# Patient Record
Sex: FEMALE | Race: WHITE | NOT HISPANIC OR LATINO | Employment: OTHER | URBAN - NONMETROPOLITAN AREA
[De-identification: names, ages, dates, MRNs, and addresses within clinical notes are randomized per-mention and may not be internally consistent; named-entity substitution may affect disease eponyms.]

---

## 2017-05-11 ENCOUNTER — OFFICE VISIT (OUTPATIENT)
Dept: ORTHOPEDIC SURGERY | Facility: CLINIC | Age: 52
End: 2017-05-11

## 2017-05-11 DIAGNOSIS — S88.111S COMPLETE BELOW KNEE AMPUTATION OF RIGHT LOWER EXTREMITY, SEQUELA: ICD-10-CM

## 2017-05-11 DIAGNOSIS — E66.01 OBESITY, CLASS III, BMI 40-49.9 (MORBID OBESITY) (HCC): ICD-10-CM

## 2017-05-11 DIAGNOSIS — Z89.512 S/P BILATERAL BELOW KNEE AMPUTATION (HCC): ICD-10-CM

## 2017-05-11 DIAGNOSIS — Z89.511 S/P BILATERAL BELOW KNEE AMPUTATION (HCC): ICD-10-CM

## 2017-05-11 DIAGNOSIS — Z96.653 HISTORY OF BILATERAL KNEE ARTHROPLASTY: Primary | ICD-10-CM

## 2017-05-11 PROCEDURE — 99213 OFFICE O/P EST LOW 20 MIN: CPT | Performed by: ORTHOPAEDIC SURGERY

## 2018-05-24 ENCOUNTER — OFFICE VISIT (OUTPATIENT)
Dept: ORTHOPEDIC SURGERY | Facility: CLINIC | Age: 53
End: 2018-05-24

## 2018-05-24 DIAGNOSIS — E66.01 OBESITY, CLASS III, BMI 40-49.9 (MORBID OBESITY) (HCC): ICD-10-CM

## 2018-05-24 DIAGNOSIS — Z89.511 S/P BILATERAL BELOW KNEE AMPUTATION (HCC): Primary | ICD-10-CM

## 2018-05-24 DIAGNOSIS — Z89.512 S/P BILATERAL BELOW KNEE AMPUTATION (HCC): Primary | ICD-10-CM

## 2018-05-24 PROCEDURE — 99213 OFFICE O/P EST LOW 20 MIN: CPT | Performed by: ORTHOPAEDIC SURGERY

## 2019-01-25 ENCOUNTER — HOSPITAL ENCOUNTER (OUTPATIENT)
Dept: ULTRASOUND IMAGING | Facility: HOSPITAL | Age: 54
Discharge: HOME | End: 2019-01-25

## 2019-02-05 ENCOUNTER — HOSPITAL ENCOUNTER (OUTPATIENT)
Dept: WOUND CARE | Facility: HOSPITAL | Age: 54
Setting detail: RECURRING SERIES
Discharge: STILL ADMITTED OR ADMINISTRATIVE | End: 2019-02-28
Attending: SURGERY

## 2019-02-07 LAB — BACTERIA SPEC AEROBE CULT: NORMAL

## 2019-03-12 ENCOUNTER — HOSPITAL ENCOUNTER (OUTPATIENT)
Dept: WOUND CARE | Facility: HOSPITAL | Age: 54
Setting detail: RECURRING SERIES
Discharge: HOME | End: 2019-03-31
Attending: SURGERY

## 2019-04-02 ENCOUNTER — HOSPITAL ENCOUNTER (OUTPATIENT)
Dept: WOUND CARE | Facility: HOSPITAL | Age: 54
Setting detail: RECURRING SERIES
Discharge: HOME | End: 2019-04-30
Attending: SURGERY

## 2020-02-24 ENCOUNTER — HOSPITAL ENCOUNTER (OUTPATIENT)
Dept: WOUND CARE | Facility: HOSPITAL | Age: 55
Setting detail: RECURRING SERIES
Discharge: STILL ADMITTED OR ADMINISTRATIVE | End: 2020-05-24
Attending: SURGERY

## 2020-02-27 LAB
BACTERIA SPEC AEROBE CULT: ABNORMAL
CIPROFLOXACIN SUSC ISLT: >=8
CLINDAMYCIN SUSC ISLT: 0.25
DAPTOMYCIN SUSC ISLT: 0.25
DOXYCYCLINE SUSC ISLT: 1
ERYTHROMYCIN SUSC ISLT: >=8
GENTAMICIN SUSC ISLT: <=0.5
LEVOFLOXACIN SUSC ISLT: >=8
OXACILLIN SUSC ISLT: >=4
RIFAMPIN SUSC ISLT: <=0.5
TETRACYCLINE SUSC ISLT: 2
TIGECYCLINE SUSC ISLT: 0.25
TMP SMX SUSC ISLT: >=320
VANCOMYCIN SUSC ISLT: 1

## 2020-06-01 ENCOUNTER — HOSPITAL ENCOUNTER (OUTPATIENT)
Dept: OTHER | Facility: HOSPITAL | Age: 55
Discharge: HOME | End: 2020-06-01
Attending: FAMILY MEDICINE

## 2020-06-01 LAB — SARS-COV-2 RNA SPEC QL NAA+PROBE: NOT DETECTED

## 2020-06-06 ENCOUNTER — HOSPITAL ENCOUNTER (OUTPATIENT)
Dept: OTHER | Facility: HOSPITAL | Age: 55
Discharge: HOME | End: 2020-06-06
Attending: FAMILY MEDICINE

## 2020-06-07 LAB — SARS-COV-2 RNA SPEC QL NAA+PROBE: NOT DETECTED

## 2020-06-25 ENCOUNTER — HOSPITAL ENCOUNTER (OUTPATIENT)
Dept: CT IMAGING | Facility: HOSPITAL | Age: 55
Discharge: HOME | End: 2020-06-25
Attending: FAMILY MEDICINE

## 2021-07-29 ENCOUNTER — TELEPHONE (OUTPATIENT)
Dept: ORTHOPEDIC SURGERY | Facility: CLINIC | Age: 56
End: 2021-07-29

## 2021-08-05 ENCOUNTER — OFFICE VISIT (OUTPATIENT)
Dept: ORTHOPEDIC SURGERY | Facility: CLINIC | Age: 56
End: 2021-08-05

## 2021-08-05 VITALS — OXYGEN SATURATION: 96 % | HEIGHT: 62 IN | HEART RATE: 100 BPM

## 2021-08-05 DIAGNOSIS — M19.011 PRIMARY OSTEOARTHRITIS OF RIGHT SHOULDER: Primary | ICD-10-CM

## 2021-08-05 DIAGNOSIS — M75.101 NONTRAUMATIC TEAR OF RIGHT ROTATOR CUFF, UNSPECIFIED TEAR EXTENT: ICD-10-CM

## 2021-08-05 PROCEDURE — 99213 OFFICE O/P EST LOW 20 MIN: CPT | Performed by: ORTHOPAEDIC SURGERY

## 2021-08-05 PROCEDURE — 20610 DRAIN/INJ JOINT/BURSA W/O US: CPT | Performed by: ORTHOPAEDIC SURGERY

## 2021-08-05 RX ADMIN — METHYLPREDNISOLONE ACETATE 80 MG: 80 INJECTION, SUSPENSION INTRA-ARTICULAR; INTRALESIONAL; INTRAMUSCULAR; SOFT TISSUE at 15:41

## 2021-08-05 RX ADMIN — LIDOCAINE HYDROCHLORIDE 9 ML: 10 INJECTION, SOLUTION INFILTRATION; PERINEURAL at 15:41

## 2021-08-08 RX ORDER — METHYLPREDNISOLONE ACETATE 80 MG/ML
80 INJECTION, SUSPENSION INTRA-ARTICULAR; INTRALESIONAL; INTRAMUSCULAR; SOFT TISSUE
Status: COMPLETED | OUTPATIENT
Start: 2021-08-05 | End: 2021-08-05

## 2021-08-08 RX ORDER — LIDOCAINE HYDROCHLORIDE 10 MG/ML
9 INJECTION, SOLUTION INFILTRATION; PERINEURAL
Status: COMPLETED | OUTPATIENT
Start: 2021-08-05 | End: 2021-08-05

## 2021-09-30 ENCOUNTER — OFFICE VISIT (OUTPATIENT)
Dept: ORTHOPEDIC SURGERY | Facility: CLINIC | Age: 56
End: 2021-09-30

## 2021-09-30 VITALS — HEART RATE: 97 BPM | WEIGHT: 293 LBS | HEIGHT: 62 IN | OXYGEN SATURATION: 97 % | BODY MASS INDEX: 53.92 KG/M2

## 2021-09-30 DIAGNOSIS — M75.101 NONTRAUMATIC TEAR OF RIGHT ROTATOR CUFF, UNSPECIFIED TEAR EXTENT: Primary | ICD-10-CM

## 2021-09-30 DIAGNOSIS — M19.011 PRIMARY OSTEOARTHRITIS OF RIGHT SHOULDER: ICD-10-CM

## 2021-09-30 PROCEDURE — 20610 DRAIN/INJ JOINT/BURSA W/O US: CPT | Performed by: PHYSICIAN ASSISTANT

## 2021-09-30 PROCEDURE — 99213 OFFICE O/P EST LOW 20 MIN: CPT | Performed by: PHYSICIAN ASSISTANT

## 2021-09-30 RX ORDER — ATORVASTATIN CALCIUM 40 MG/1
TABLET, FILM COATED ORAL
COMMUNITY
Start: 2021-09-18

## 2021-09-30 RX ORDER — LEVOTHYROXINE SODIUM 0.05 MG/1
TABLET ORAL
COMMUNITY
Start: 2021-07-27

## 2021-09-30 RX ORDER — METHYLPREDNISOLONE ACETATE 80 MG/ML
80 INJECTION, SUSPENSION INTRA-ARTICULAR; INTRALESIONAL; INTRAMUSCULAR; SOFT TISSUE
Status: COMPLETED | OUTPATIENT
Start: 2021-09-30 | End: 2021-09-30

## 2021-09-30 RX ORDER — LIDOCAINE HYDROCHLORIDE 10 MG/ML
9 INJECTION, SOLUTION INFILTRATION; PERINEURAL
Status: COMPLETED | OUTPATIENT
Start: 2021-09-30 | End: 2021-09-30

## 2021-09-30 RX ORDER — FLUTICASONE PROPIONATE 50 MCG
SPRAY, SUSPENSION (ML) NASAL
COMMUNITY
Start: 2021-09-05

## 2021-09-30 RX ORDER — CYCLOBENZAPRINE HCL 10 MG
TABLET ORAL
COMMUNITY
Start: 2021-08-29

## 2021-09-30 RX ORDER — AMLODIPINE BESYLATE 5 MG/1
TABLET ORAL
COMMUNITY
Start: 2021-09-14

## 2021-09-30 RX ORDER — INSULIN DETEMIR 100 [IU]/ML
INJECTION, SOLUTION SUBCUTANEOUS
COMMUNITY
Start: 2021-09-12

## 2021-09-30 RX ORDER — CANAGLIFLOZIN 300 MG/1
TABLET, FILM COATED ORAL
COMMUNITY
Start: 2021-09-22

## 2021-09-30 RX ORDER — LOSARTAN POTASSIUM 100 MG/1
TABLET ORAL
COMMUNITY
Start: 2021-09-13

## 2021-09-30 RX ADMIN — LIDOCAINE HYDROCHLORIDE 9 ML: 10 INJECTION, SOLUTION INFILTRATION; PERINEURAL at 14:58

## 2021-09-30 RX ADMIN — METHYLPREDNISOLONE ACETATE 80 MG: 80 INJECTION, SUSPENSION INTRA-ARTICULAR; INTRALESIONAL; INTRAMUSCULAR; SOFT TISSUE at 14:58

## 2022-01-11 ENCOUNTER — OFFICE VISIT (OUTPATIENT)
Dept: ORTHOPEDIC SURGERY | Facility: CLINIC | Age: 57
End: 2022-01-11

## 2022-01-11 VITALS — BODY MASS INDEX: 53.92 KG/M2 | OXYGEN SATURATION: 95 % | WEIGHT: 293 LBS | HEART RATE: 98 BPM | HEIGHT: 62 IN

## 2022-01-11 DIAGNOSIS — M19.011 PRIMARY OSTEOARTHRITIS OF RIGHT SHOULDER: ICD-10-CM

## 2022-01-11 DIAGNOSIS — M75.101 NONTRAUMATIC TEAR OF RIGHT ROTATOR CUFF, UNSPECIFIED TEAR EXTENT: Primary | ICD-10-CM

## 2022-01-11 PROCEDURE — 99213 OFFICE O/P EST LOW 20 MIN: CPT | Performed by: PHYSICIAN ASSISTANT

## 2022-01-11 PROCEDURE — 20610 DRAIN/INJ JOINT/BURSA W/O US: CPT | Performed by: PHYSICIAN ASSISTANT

## 2022-01-11 RX ORDER — AZELASTINE 1 MG/ML
SPRAY, METERED NASAL
COMMUNITY
Start: 2021-11-30

## 2022-01-11 RX ORDER — TRIAMCINOLONE ACETONIDE 40 MG/ML
40 INJECTION, SUSPENSION INTRA-ARTICULAR; INTRAMUSCULAR
Status: COMPLETED | OUTPATIENT
Start: 2022-01-11 | End: 2022-01-11

## 2022-01-11 RX ORDER — BUPIVACAINE HYDROCHLORIDE 2.5 MG/ML
5 INJECTION, SOLUTION INFILTRATION; PERINEURAL
Status: COMPLETED | OUTPATIENT
Start: 2022-01-11 | End: 2022-01-11

## 2022-01-11 RX ADMIN — TRIAMCINOLONE ACETONIDE 40 MG: 40 INJECTION, SUSPENSION INTRA-ARTICULAR; INTRAMUSCULAR at 13:37

## 2022-01-11 RX ADMIN — BUPIVACAINE HYDROCHLORIDE 5 ML: 2.5 INJECTION, SOLUTION INFILTRATION; PERINEURAL at 13:37

## 2022-04-11 ENCOUNTER — OFFICE VISIT (OUTPATIENT)
Dept: ORTHOPEDIC SURGERY | Facility: CLINIC | Age: 57
End: 2022-04-11

## 2022-04-11 VITALS — HEART RATE: 85 BPM | HEIGHT: 62 IN | BODY MASS INDEX: 53.92 KG/M2 | OXYGEN SATURATION: 95 % | WEIGHT: 293 LBS

## 2022-04-11 DIAGNOSIS — M75.101 NONTRAUMATIC TEAR OF RIGHT ROTATOR CUFF, UNSPECIFIED TEAR EXTENT: Primary | ICD-10-CM

## 2022-04-11 DIAGNOSIS — M19.011 PRIMARY OSTEOARTHRITIS OF RIGHT SHOULDER: ICD-10-CM

## 2022-04-11 PROCEDURE — 20610 DRAIN/INJ JOINT/BURSA W/O US: CPT | Performed by: PHYSICIAN ASSISTANT

## 2022-04-11 RX ORDER — TRIAMCINOLONE ACETONIDE 40 MG/ML
40 INJECTION, SUSPENSION INTRA-ARTICULAR; INTRAMUSCULAR
Status: COMPLETED | OUTPATIENT
Start: 2022-04-11 | End: 2022-04-11

## 2022-04-11 RX ORDER — LIDOCAINE HYDROCHLORIDE 10 MG/ML
5 INJECTION, SOLUTION INFILTRATION; PERINEURAL
Status: COMPLETED | OUTPATIENT
Start: 2022-04-11 | End: 2022-04-11

## 2022-04-11 RX ADMIN — LIDOCAINE HYDROCHLORIDE 5 ML: 10 INJECTION, SOLUTION INFILTRATION; PERINEURAL at 13:07

## 2022-04-11 RX ADMIN — TRIAMCINOLONE ACETONIDE 40 MG: 40 INJECTION, SUSPENSION INTRA-ARTICULAR; INTRAMUSCULAR at 13:07

## 2022-06-21 ENCOUNTER — OFFICE VISIT (OUTPATIENT)
Dept: SURGERY | Facility: CLINIC | Age: 57
End: 2022-06-21

## 2022-06-21 VITALS — RESPIRATION RATE: 14 BRPM | WEIGHT: 293 LBS | BODY MASS INDEX: 53.92 KG/M2 | HEIGHT: 62 IN

## 2022-06-21 DIAGNOSIS — K42.9 UMBILICAL HERNIA WITHOUT OBSTRUCTION AND WITHOUT GANGRENE: Primary | ICD-10-CM

## 2022-06-21 DIAGNOSIS — E66.3 OVERWEIGHT: ICD-10-CM

## 2022-06-21 DIAGNOSIS — Z89.9 S/P AMPUTATION: ICD-10-CM

## 2022-06-21 PROCEDURE — 99202 OFFICE O/P NEW SF 15 MIN: CPT | Performed by: SURGERY

## 2022-06-21 RX ORDER — CANAGLIFLOZIN 100 MG/1
TABLET, FILM COATED ORAL
COMMUNITY
Start: 2022-06-20

## 2022-06-21 RX ORDER — INSULIN ASPART 100 [IU]/ML
INJECTION, SOLUTION INTRAVENOUS; SUBCUTANEOUS
COMMUNITY
Start: 2022-06-05

## 2022-06-21 RX ORDER — DOXYCYCLINE HYCLATE 100 MG
TABLET ORAL
COMMUNITY
Start: 2022-04-24

## 2022-07-18 ENCOUNTER — OFFICE VISIT (OUTPATIENT)
Dept: ORTHOPEDIC SURGERY | Facility: CLINIC | Age: 57
End: 2022-07-18

## 2022-07-18 VITALS — BODY MASS INDEX: 53.92 KG/M2 | HEIGHT: 62 IN | HEART RATE: 91 BPM | WEIGHT: 293 LBS | OXYGEN SATURATION: 100 %

## 2022-07-18 DIAGNOSIS — M19.011 PRIMARY OSTEOARTHRITIS OF RIGHT SHOULDER: ICD-10-CM

## 2022-07-18 DIAGNOSIS — M75.101 NONTRAUMATIC TEAR OF RIGHT ROTATOR CUFF, UNSPECIFIED TEAR EXTENT: Primary | ICD-10-CM

## 2022-07-18 PROCEDURE — 20610 DRAIN/INJ JOINT/BURSA W/O US: CPT | Performed by: PHYSICIAN ASSISTANT

## 2022-07-18 RX ORDER — LIDOCAINE HYDROCHLORIDE 10 MG/ML
9 INJECTION, SOLUTION INFILTRATION; PERINEURAL
Status: COMPLETED | OUTPATIENT
Start: 2022-07-18 | End: 2022-07-18

## 2022-07-18 RX ORDER — TRIAMCINOLONE ACETONIDE 40 MG/ML
40 INJECTION, SUSPENSION INTRA-ARTICULAR; INTRAMUSCULAR
Status: COMPLETED | OUTPATIENT
Start: 2022-07-18 | End: 2022-07-18

## 2022-07-18 RX ORDER — SEMAGLUTIDE 1.34 MG/ML
INJECTION, SOLUTION SUBCUTANEOUS
COMMUNITY
Start: 2022-06-21 | End: 2022-10-17 | Stop reason: DRUGHIGH

## 2022-07-18 RX ADMIN — TRIAMCINOLONE ACETONIDE 40 MG: 40 INJECTION, SUSPENSION INTRA-ARTICULAR; INTRAMUSCULAR at 09:27

## 2022-07-18 RX ADMIN — LIDOCAINE HYDROCHLORIDE 9 ML: 10 INJECTION, SOLUTION INFILTRATION; PERINEURAL at 09:27

## 2022-09-20 ENCOUNTER — OFFICE VISIT (OUTPATIENT)
Dept: SURGERY | Facility: CLINIC | Age: 57
End: 2022-09-20

## 2022-09-20 ENCOUNTER — HOSPITAL ENCOUNTER (EMERGENCY)
Facility: HOSPITAL | Age: 57
End: 2022-09-20

## 2022-09-20 VITALS — RESPIRATION RATE: 16 BRPM | BODY MASS INDEX: 53.92 KG/M2 | HEIGHT: 62 IN | WEIGHT: 293 LBS

## 2022-09-20 DIAGNOSIS — Z89.9 S/P AMPUTATION: ICD-10-CM

## 2022-09-20 DIAGNOSIS — K42.9 UMBILICAL HERNIA WITHOUT OBSTRUCTION AND WITHOUT GANGRENE: Primary | ICD-10-CM

## 2022-09-20 DIAGNOSIS — E66.3 OVERWEIGHT: ICD-10-CM

## 2022-09-20 PROCEDURE — 99212 OFFICE O/P EST SF 10 MIN: CPT | Performed by: SURGERY

## 2022-09-20 RX ORDER — AMLODIPINE BESYLATE 5 MG/1
5 TABLET ORAL DAILY
COMMUNITY

## 2022-09-20 RX ORDER — LORATADINE 10 MG/1
CAPSULE, LIQUID FILLED ORAL
COMMUNITY

## 2022-09-20 RX ORDER — NYSTATIN 100000 [USP'U]/G
POWDER TOPICAL 4 TIMES DAILY
COMMUNITY

## 2022-10-17 ENCOUNTER — OFFICE VISIT (OUTPATIENT)
Dept: ORTHOPEDIC SURGERY | Facility: CLINIC | Age: 57
End: 2022-10-17

## 2022-10-17 VITALS — HEIGHT: 62 IN | BODY MASS INDEX: 53.92 KG/M2 | HEART RATE: 103 BPM | WEIGHT: 293 LBS | OXYGEN SATURATION: 93 %

## 2022-10-17 DIAGNOSIS — M19.011 PRIMARY OSTEOARTHRITIS OF RIGHT SHOULDER: ICD-10-CM

## 2022-10-17 DIAGNOSIS — M75.101 NONTRAUMATIC TEAR OF RIGHT ROTATOR CUFF, UNSPECIFIED TEAR EXTENT: Primary | ICD-10-CM

## 2022-10-17 PROCEDURE — 99213 OFFICE O/P EST LOW 20 MIN: CPT | Performed by: PHYSICIAN ASSISTANT

## 2022-10-17 RX ORDER — SEMAGLUTIDE 1.34 MG/ML
INJECTION, SOLUTION SUBCUTANEOUS
COMMUNITY
Start: 2022-10-12

## 2023-01-17 ENCOUNTER — OFFICE VISIT (OUTPATIENT)
Dept: ORTHOPEDIC SURGERY | Facility: CLINIC | Age: 58
End: 2023-01-17
Payer: MEDICARE

## 2023-01-17 VITALS — OXYGEN SATURATION: 93 % | HEIGHT: 62 IN | HEART RATE: 90 BPM | WEIGHT: 293 LBS | BODY MASS INDEX: 53.92 KG/M2

## 2023-01-17 DIAGNOSIS — M75.101 NONTRAUMATIC TEAR OF RIGHT ROTATOR CUFF, UNSPECIFIED TEAR EXTENT: Primary | ICD-10-CM

## 2023-01-17 DIAGNOSIS — M19.011 PRIMARY OSTEOARTHRITIS OF RIGHT SHOULDER: ICD-10-CM

## 2023-01-17 PROCEDURE — 20610 DRAIN/INJ JOINT/BURSA W/O US: CPT | Performed by: PHYSICIAN ASSISTANT

## 2023-01-17 RX ORDER — LIDOCAINE HYDROCHLORIDE 10 MG/ML
5 INJECTION, SOLUTION INFILTRATION; PERINEURAL
Status: COMPLETED | OUTPATIENT
Start: 2023-01-17 | End: 2023-01-17

## 2023-01-17 RX ORDER — GABAPENTIN 400 MG/1
CAPSULE ORAL
COMMUNITY
Start: 2023-01-16

## 2023-01-17 RX ORDER — INSULIN GLARGINE 100 [IU]/ML
INJECTION, SOLUTION SUBCUTANEOUS
COMMUNITY
Start: 2022-12-31

## 2023-01-17 RX ORDER — TRIAMCINOLONE ACETONIDE 40 MG/ML
40 INJECTION, SUSPENSION INTRA-ARTICULAR; INTRAMUSCULAR
Status: COMPLETED | OUTPATIENT
Start: 2023-01-17 | End: 2023-01-17

## 2023-01-17 RX ADMIN — LIDOCAINE HYDROCHLORIDE 5 ML: 10 INJECTION, SOLUTION INFILTRATION; PERINEURAL at 08:10

## 2023-01-17 RX ADMIN — TRIAMCINOLONE ACETONIDE 40 MG: 40 INJECTION, SUSPENSION INTRA-ARTICULAR; INTRAMUSCULAR at 08:10

## 2023-03-20 ENCOUNTER — OFFICE VISIT (OUTPATIENT)
Dept: SURGERY | Facility: CLINIC | Age: 58
End: 2023-03-20
Payer: MEDICARE

## 2023-03-20 VITALS — WEIGHT: 293 LBS | HEIGHT: 62 IN | BODY MASS INDEX: 53.92 KG/M2 | RESPIRATION RATE: 16 BRPM

## 2023-03-20 DIAGNOSIS — K42.9 UMBILICAL HERNIA WITHOUT OBSTRUCTION AND WITHOUT GANGRENE: Primary | ICD-10-CM

## 2023-03-20 DIAGNOSIS — E66.3 OVERWEIGHT: ICD-10-CM

## 2023-03-20 DIAGNOSIS — Z89.9 S/P AMPUTATION: ICD-10-CM

## 2023-03-20 PROCEDURE — 1160F RVW MEDS BY RX/DR IN RCRD: CPT | Performed by: SURGERY

## 2023-03-20 PROCEDURE — 99212 OFFICE O/P EST SF 10 MIN: CPT | Performed by: SURGERY

## 2023-03-20 PROCEDURE — 1159F MED LIST DOCD IN RCRD: CPT | Performed by: SURGERY

## 2023-03-20 RX ORDER — GLUCAGON 1 MG
KIT INJECTION
COMMUNITY
Start: 2023-02-06

## 2023-03-20 RX ORDER — GLUCAGON INJECTION, SOLUTION 1 MG/.2ML
INJECTION, SOLUTION SUBCUTANEOUS
COMMUNITY
Start: 2023-02-16

## 2023-04-18 ENCOUNTER — OFFICE VISIT (OUTPATIENT)
Dept: ORTHOPEDIC SURGERY | Facility: CLINIC | Age: 58
End: 2023-04-18
Payer: MEDICARE

## 2023-04-18 VITALS — HEART RATE: 75 BPM | WEIGHT: 293 LBS | OXYGEN SATURATION: 96 % | BODY MASS INDEX: 53.92 KG/M2 | HEIGHT: 62 IN

## 2023-04-18 DIAGNOSIS — M19.011 PRIMARY OSTEOARTHRITIS OF RIGHT SHOULDER: ICD-10-CM

## 2023-04-18 DIAGNOSIS — M75.101 NONTRAUMATIC TEAR OF RIGHT ROTATOR CUFF, UNSPECIFIED TEAR EXTENT: Primary | ICD-10-CM

## 2023-04-18 RX ORDER — METHYLPREDNISOLONE ACETATE 80 MG/ML
80 INJECTION, SUSPENSION INTRA-ARTICULAR; INTRALESIONAL; INTRAMUSCULAR; SOFT TISSUE
Status: COMPLETED | OUTPATIENT
Start: 2023-04-18 | End: 2023-04-18

## 2023-04-18 RX ORDER — LIDOCAINE HYDROCHLORIDE 10 MG/ML
5 INJECTION, SOLUTION EPIDURAL; INFILTRATION; INTRACAUDAL; PERINEURAL
Status: COMPLETED | OUTPATIENT
Start: 2023-04-18 | End: 2023-04-18

## 2023-04-18 RX ADMIN — LIDOCAINE HYDROCHLORIDE 5 ML: 10 INJECTION, SOLUTION EPIDURAL; INFILTRATION; INTRACAUDAL; PERINEURAL at 08:04

## 2023-04-18 RX ADMIN — METHYLPREDNISOLONE ACETATE 80 MG: 80 INJECTION, SUSPENSION INTRA-ARTICULAR; INTRALESIONAL; INTRAMUSCULAR; SOFT TISSUE at 08:04

## 2023-09-18 ENCOUNTER — OFFICE VISIT (OUTPATIENT)
Dept: SURGERY | Facility: CLINIC | Age: 58
End: 2023-09-18
Payer: MEDICARE

## 2023-09-18 VITALS — HEIGHT: 62 IN | BODY MASS INDEX: 53 KG/M2 | RESPIRATION RATE: 16 BRPM | WEIGHT: 288 LBS

## 2023-09-18 DIAGNOSIS — E66.3 OVERWEIGHT: ICD-10-CM

## 2023-09-18 DIAGNOSIS — K42.9 UMBILICAL HERNIA WITHOUT OBSTRUCTION AND WITHOUT GANGRENE: Primary | ICD-10-CM

## 2023-09-18 PROCEDURE — 1160F RVW MEDS BY RX/DR IN RCRD: CPT | Performed by: SURGERY

## 2023-09-18 PROCEDURE — 99212 OFFICE O/P EST SF 10 MIN: CPT | Performed by: SURGERY

## 2023-09-18 PROCEDURE — 1159F MED LIST DOCD IN RCRD: CPT | Performed by: SURGERY

## 2023-10-30 ENCOUNTER — OFFICE VISIT (OUTPATIENT)
Dept: ORTHOPEDIC SURGERY | Facility: CLINIC | Age: 58
End: 2023-10-30
Payer: MEDICARE

## 2023-10-30 VITALS
SYSTOLIC BLOOD PRESSURE: 134 MMHG | HEART RATE: 94 BPM | DIASTOLIC BLOOD PRESSURE: 80 MMHG | WEIGHT: 288.8 LBS | OXYGEN SATURATION: 97 % | HEIGHT: 62 IN | BODY MASS INDEX: 53.15 KG/M2

## 2023-10-30 DIAGNOSIS — M19.011 PRIMARY OSTEOARTHRITIS OF RIGHT SHOULDER: ICD-10-CM

## 2023-10-30 DIAGNOSIS — M75.101 NONTRAUMATIC TEAR OF RIGHT ROTATOR CUFF, UNSPECIFIED TEAR EXTENT: Primary | ICD-10-CM

## 2023-10-30 PROCEDURE — 99213 OFFICE O/P EST LOW 20 MIN: CPT | Performed by: PHYSICIAN ASSISTANT

## 2023-10-30 PROCEDURE — 1159F MED LIST DOCD IN RCRD: CPT | Performed by: PHYSICIAN ASSISTANT

## 2023-10-30 PROCEDURE — 20610 DRAIN/INJ JOINT/BURSA W/O US: CPT | Performed by: PHYSICIAN ASSISTANT

## 2023-10-30 PROCEDURE — 1160F RVW MEDS BY RX/DR IN RCRD: CPT | Performed by: PHYSICIAN ASSISTANT

## 2023-10-30 RX ORDER — TRIAMCINOLONE ACETONIDE 40 MG/ML
40 INJECTION, SUSPENSION INTRA-ARTICULAR; INTRAMUSCULAR
Status: COMPLETED | OUTPATIENT
Start: 2023-10-30 | End: 2023-10-30

## 2023-10-30 RX ORDER — LIDOCAINE HYDROCHLORIDE 10 MG/ML
5 INJECTION, SOLUTION INFILTRATION; PERINEURAL
Status: COMPLETED | OUTPATIENT
Start: 2023-10-30 | End: 2023-10-30

## 2023-10-30 RX ADMIN — LIDOCAINE HYDROCHLORIDE 5 ML: 10 INJECTION, SOLUTION INFILTRATION; PERINEURAL at 10:23

## 2023-10-30 RX ADMIN — TRIAMCINOLONE ACETONIDE 40 MG: 40 INJECTION, SUSPENSION INTRA-ARTICULAR; INTRAMUSCULAR at 10:23

## 2023-11-17 ENCOUNTER — HOSPITAL ENCOUNTER (OUTPATIENT)
Dept: MRI IMAGING | Facility: HOSPITAL | Age: 58
Discharge: HOME | End: 2023-11-17
Admitting: PHYSICIAN ASSISTANT
Payer: MEDICARE

## 2023-11-17 DIAGNOSIS — M75.101 NONTRAUMATIC TEAR OF RIGHT ROTATOR CUFF, UNSPECIFIED TEAR EXTENT: ICD-10-CM

## 2023-11-17 DIAGNOSIS — M19.011 PRIMARY OSTEOARTHRITIS OF RIGHT SHOULDER: ICD-10-CM

## 2023-11-17 PROCEDURE — 73221 MRI JOINT UPR EXTREM W/O DYE: CPT

## 2023-11-21 ENCOUNTER — OFFICE VISIT (OUTPATIENT)
Dept: ORTHOPEDIC SURGERY | Facility: CLINIC | Age: 58
End: 2023-11-21
Payer: MEDICARE

## 2023-11-21 VITALS — HEART RATE: 102 BPM | HEIGHT: 62 IN | BODY MASS INDEX: 53.55 KG/M2 | WEIGHT: 291.01 LBS | OXYGEN SATURATION: 92 %

## 2023-11-21 DIAGNOSIS — M75.101 NONTRAUMATIC TEAR OF RIGHT ROTATOR CUFF, UNSPECIFIED TEAR EXTENT: Primary | ICD-10-CM

## 2023-11-21 DIAGNOSIS — M19.011 PRIMARY OSTEOARTHRITIS OF RIGHT SHOULDER: ICD-10-CM

## 2023-11-21 RX ORDER — ATORVASTATIN CALCIUM 40 MG/1
TABLET, FILM COATED ORAL
COMMUNITY
Start: 2023-10-31

## 2024-01-11 ENCOUNTER — TELEPHONE (OUTPATIENT)
Dept: ORTHOPEDIC SURGERY | Facility: CLINIC | Age: 59
End: 2024-01-11
Payer: MEDICARE

## 2024-02-02 ENCOUNTER — OFFICE VISIT (OUTPATIENT)
Dept: ORTHOPEDIC SURGERY | Facility: CLINIC | Age: 59
End: 2024-02-02
Payer: MEDICARE

## 2024-02-02 VITALS
WEIGHT: 293 LBS | SYSTOLIC BLOOD PRESSURE: 179 MMHG | HEART RATE: 82 BPM | DIASTOLIC BLOOD PRESSURE: 90 MMHG | OXYGEN SATURATION: 96 % | HEIGHT: 62 IN | BODY MASS INDEX: 53.92 KG/M2

## 2024-02-02 DIAGNOSIS — M75.101 NONTRAUMATIC TEAR OF RIGHT ROTATOR CUFF, UNSPECIFIED TEAR EXTENT: Primary | ICD-10-CM

## 2024-02-02 DIAGNOSIS — M19.011 PRIMARY OSTEOARTHRITIS OF RIGHT SHOULDER: ICD-10-CM

## 2024-02-02 RX ORDER — ORAL SEMAGLUTIDE 7 MG/1
TABLET ORAL
COMMUNITY
Start: 2024-01-23

## 2024-02-02 RX ORDER — ERGOCALCIFEROL 1.25 MG/1
CAPSULE ORAL
COMMUNITY
Start: 2024-01-21

## 2024-02-02 RX ORDER — ACETAMINOPHEN 325 MG/1
TABLET ORAL
COMMUNITY
Start: 2024-01-28

## 2024-02-02 RX ORDER — LIDOCAINE HYDROCHLORIDE 10 MG/ML
5 INJECTION, SOLUTION INFILTRATION; PERINEURAL
Status: COMPLETED | OUTPATIENT
Start: 2024-02-02 | End: 2024-02-02

## 2024-02-02 RX ORDER — TRIAMCINOLONE ACETONIDE 40 MG/ML
40 INJECTION, SUSPENSION INTRA-ARTICULAR; INTRAMUSCULAR
Status: COMPLETED | OUTPATIENT
Start: 2024-02-02 | End: 2024-02-02

## 2024-02-02 RX ORDER — INSULIN GLARGINE 100 [IU]/ML
INJECTION, SOLUTION SUBCUTANEOUS
COMMUNITY
Start: 2024-01-12

## 2024-02-02 RX ADMIN — TRIAMCINOLONE ACETONIDE 40 MG: 40 INJECTION, SUSPENSION INTRA-ARTICULAR; INTRAMUSCULAR at 08:55

## 2024-02-02 RX ADMIN — LIDOCAINE HYDROCHLORIDE 5 ML: 10 INJECTION, SOLUTION INFILTRATION; PERINEURAL at 08:55

## 2024-03-19 ENCOUNTER — OFFICE VISIT (OUTPATIENT)
Dept: SURGERY | Facility: CLINIC | Age: 59
End: 2024-03-19
Payer: MEDICARE

## 2024-03-19 VITALS — HEIGHT: 62 IN | BODY MASS INDEX: 53.62 KG/M2

## 2024-03-19 DIAGNOSIS — E66.3 OVERWEIGHT: ICD-10-CM

## 2024-03-19 DIAGNOSIS — K42.9 UMBILICAL HERNIA WITHOUT OBSTRUCTION AND WITHOUT GANGRENE: Primary | ICD-10-CM

## 2024-03-19 PROCEDURE — 1159F MED LIST DOCD IN RCRD: CPT | Performed by: SURGERY

## 2024-03-19 PROCEDURE — 99202 OFFICE O/P NEW SF 15 MIN: CPT | Performed by: SURGERY

## 2024-03-19 PROCEDURE — 1160F RVW MEDS BY RX/DR IN RCRD: CPT | Performed by: SURGERY

## 2024-03-19 RX ORDER — LORATADINE 10 MG/1
TABLET ORAL
COMMUNITY
Start: 2024-01-18

## 2024-03-19 RX ORDER — MENTHOL AND METHYL SALICYLATE 10; 30 G/100G; G/100G
1 CREAM TOPICAL 3 TIMES DAILY PRN
COMMUNITY

## 2024-03-19 RX ORDER — INSULIN ADMIN. SUPPLIES
INSULIN PEN (EA) SUBCUTANEOUS
COMMUNITY
Start: 2024-03-11

## 2024-03-19 RX ORDER — ORAL SEMAGLUTIDE 3 MG/1
TABLET ORAL
COMMUNITY
Start: 2023-11-28

## 2024-04-11 ENCOUNTER — OFFICE VISIT (OUTPATIENT)
Dept: ORTHOPEDIC SURGERY | Facility: CLINIC | Age: 59
End: 2024-04-11
Payer: MEDICARE

## 2024-04-11 VITALS — WEIGHT: 293 LBS | BODY MASS INDEX: 53.92 KG/M2 | HEIGHT: 62 IN

## 2024-04-11 DIAGNOSIS — M75.101 NONTRAUMATIC TEAR OF RIGHT ROTATOR CUFF, UNSPECIFIED TEAR EXTENT: Primary | ICD-10-CM

## 2024-04-11 DIAGNOSIS — M19.011 PRIMARY OSTEOARTHRITIS OF RIGHT SHOULDER: ICD-10-CM

## 2024-04-11 RX ORDER — CLONIDINE HYDROCHLORIDE 0.1 MG/1
TABLET ORAL
COMMUNITY
Start: 2024-03-21

## 2024-04-11 RX ORDER — TRIAMCINOLONE ACETONIDE 40 MG/ML
40 INJECTION, SUSPENSION INTRA-ARTICULAR; INTRAMUSCULAR
Status: COMPLETED | OUTPATIENT
Start: 2024-04-11 | End: 2024-04-11

## 2024-04-11 RX ORDER — AMLODIPINE BESYLATE 10 MG/1
TABLET ORAL
COMMUNITY
Start: 2024-04-03

## 2024-04-11 RX ORDER — ORAL SEMAGLUTIDE 14 MG/1
TABLET ORAL
COMMUNITY
Start: 2024-03-20

## 2024-04-11 RX ORDER — LIDOCAINE HYDROCHLORIDE 10 MG/ML
5 INJECTION, SOLUTION INFILTRATION; PERINEURAL
Status: COMPLETED | OUTPATIENT
Start: 2024-04-11 | End: 2024-04-11

## 2024-04-11 RX ADMIN — LIDOCAINE HYDROCHLORIDE 5 ML: 10 INJECTION, SOLUTION INFILTRATION; PERINEURAL at 11:40

## 2024-04-11 RX ADMIN — TRIAMCINOLONE ACETONIDE 40 MG: 40 INJECTION, SUSPENSION INTRA-ARTICULAR; INTRAMUSCULAR at 11:40

## 2024-05-09 ENCOUNTER — TRANSCRIBE ORDERS (OUTPATIENT)
Dept: LAB | Facility: HOSPITAL | Age: 59
End: 2024-05-09
Payer: MEDICARE

## 2024-05-09 DIAGNOSIS — S09.90XS: ICD-10-CM

## 2024-05-09 DIAGNOSIS — S41.001D: Primary | ICD-10-CM

## 2024-05-09 DIAGNOSIS — S43.004D: Primary | ICD-10-CM

## 2024-05-09 DIAGNOSIS — F02.811: ICD-10-CM

## 2024-05-09 LAB
ALBUMIN SERPL-MCNC: 3 G/DL (ref 3.5–5.2)
ALBUMIN/GLOB SERPL: 1.2 G/DL
ALP SERPL-CCNC: 82 U/L (ref 39–117)
ALT SERPL W P-5'-P-CCNC: 13 U/L (ref 1–33)
ANION GAP SERPL CALCULATED.3IONS-SCNC: 11.6 MMOL/L (ref 5–15)
AST SERPL-CCNC: 9 U/L (ref 1–32)
BASOPHILS # BLD AUTO: 0.03 10*3/MM3 (ref 0–0.2)
BASOPHILS NFR BLD AUTO: 0.5 % (ref 0–1.5)
BILIRUB SERPL-MCNC: 0.3 MG/DL (ref 0–1.2)
BUN SERPL-MCNC: 33 MG/DL (ref 6–20)
BUN/CREAT SERPL: 17 (ref 7–25)
CALCIUM SPEC-SCNC: 9 MG/DL (ref 8.6–10.5)
CHLORIDE SERPL-SCNC: 107 MMOL/L (ref 98–107)
CO2 SERPL-SCNC: 20.4 MMOL/L (ref 22–29)
CREAT SERPL-MCNC: 1.94 MG/DL (ref 0.57–1)
DEPRECATED RDW RBC AUTO: 48.2 FL (ref 37–54)
EGFRCR SERPLBLD CKD-EPI 2021: 29.7 ML/MIN/1.73
EOSINOPHIL # BLD AUTO: 0.13 10*3/MM3 (ref 0–0.4)
EOSINOPHIL NFR BLD AUTO: 2.2 % (ref 0.3–6.2)
ERYTHROCYTE [DISTWIDTH] IN BLOOD BY AUTOMATED COUNT: 14.6 % (ref 12.3–15.4)
GLOBULIN UR ELPH-MCNC: 2.5 GM/DL
GLUCOSE SERPL-MCNC: 152 MG/DL (ref 65–99)
HCT VFR BLD AUTO: 39.5 % (ref 34–46.6)
HGB BLD-MCNC: 12.2 G/DL (ref 12–15.9)
IMM GRANULOCYTES # BLD AUTO: 0.01 10*3/MM3 (ref 0–0.05)
IMM GRANULOCYTES NFR BLD AUTO: 0.2 % (ref 0–0.5)
LYMPHOCYTES # BLD AUTO: 1.42 10*3/MM3 (ref 0.7–3.1)
LYMPHOCYTES NFR BLD AUTO: 23.6 % (ref 19.6–45.3)
MCH RBC QN AUTO: 27.9 PG (ref 26.6–33)
MCHC RBC AUTO-ENTMCNC: 30.9 G/DL (ref 31.5–35.7)
MCV RBC AUTO: 90.2 FL (ref 79–97)
MONOCYTES # BLD AUTO: 0.46 10*3/MM3 (ref 0.1–0.9)
MONOCYTES NFR BLD AUTO: 7.6 % (ref 5–12)
NEUTROPHILS NFR BLD AUTO: 3.97 10*3/MM3 (ref 1.7–7)
NEUTROPHILS NFR BLD AUTO: 65.9 % (ref 42.7–76)
NRBC BLD AUTO-RTO: 0 /100 WBC (ref 0–0.2)
PLATELET # BLD AUTO: 237 10*3/MM3 (ref 140–450)
PMV BLD AUTO: 10.6 FL (ref 6–12)
POTASSIUM SERPL-SCNC: 5.4 MMOL/L (ref 3.5–5.2)
PROT SERPL-MCNC: 5.5 G/DL (ref 6–8.5)
RBC # BLD AUTO: 4.38 10*6/MM3 (ref 3.77–5.28)
SODIUM SERPL-SCNC: 139 MMOL/L (ref 136–145)
WBC NRBC COR # BLD AUTO: 6.02 10*3/MM3 (ref 3.4–10.8)

## 2024-05-09 PROCEDURE — 85025 COMPLETE CBC W/AUTO DIFF WBC: CPT | Performed by: NURSE PRACTITIONER

## 2024-05-09 PROCEDURE — 80053 COMPREHEN METABOLIC PANEL: CPT | Performed by: NURSE PRACTITIONER

## 2024-05-09 PROCEDURE — 36415 COLL VENOUS BLD VENIPUNCTURE: CPT | Performed by: NURSE PRACTITIONER

## 2024-07-18 ENCOUNTER — OFFICE VISIT (OUTPATIENT)
Dept: ORTHOPEDIC SURGERY | Facility: CLINIC | Age: 59
End: 2024-07-18
Payer: MEDICARE

## 2024-07-18 VITALS
HEIGHT: 62 IN | SYSTOLIC BLOOD PRESSURE: 144 MMHG | DIASTOLIC BLOOD PRESSURE: 66 MMHG | HEART RATE: 82 BPM | OXYGEN SATURATION: 97 % | BODY MASS INDEX: 54.02 KG/M2

## 2024-07-18 DIAGNOSIS — M19.011 PRIMARY OSTEOARTHRITIS OF RIGHT SHOULDER: ICD-10-CM

## 2024-07-18 DIAGNOSIS — M75.101 NONTRAUMATIC TEAR OF RIGHT ROTATOR CUFF, UNSPECIFIED TEAR EXTENT: Primary | ICD-10-CM

## 2024-07-18 RX ORDER — LIDOCAINE HYDROCHLORIDE 10 MG/ML
5 INJECTION, SOLUTION INFILTRATION; PERINEURAL
Status: COMPLETED | OUTPATIENT
Start: 2024-07-18 | End: 2024-07-18

## 2024-07-18 RX ORDER — TRIAMCINOLONE ACETONIDE 40 MG/ML
40 INJECTION, SUSPENSION INTRA-ARTICULAR; INTRAMUSCULAR
Status: COMPLETED | OUTPATIENT
Start: 2024-07-18 | End: 2024-07-18

## 2024-07-18 RX ADMIN — TRIAMCINOLONE ACETONIDE 40 MG: 40 INJECTION, SUSPENSION INTRA-ARTICULAR; INTRAMUSCULAR at 10:16

## 2024-07-18 RX ADMIN — LIDOCAINE HYDROCHLORIDE 5 ML: 10 INJECTION, SOLUTION INFILTRATION; PERINEURAL at 10:16

## 2024-09-20 ENCOUNTER — OFFICE VISIT (OUTPATIENT)
Dept: SURGERY | Facility: CLINIC | Age: 59
End: 2024-09-20
Payer: MEDICARE

## 2024-09-20 VITALS
DIASTOLIC BLOOD PRESSURE: 67 MMHG | SYSTOLIC BLOOD PRESSURE: 153 MMHG | WEIGHT: 293 LBS | BODY MASS INDEX: 53.92 KG/M2 | HEART RATE: 67 BPM | HEIGHT: 62 IN

## 2024-09-20 DIAGNOSIS — E66.3 OVERWEIGHT: ICD-10-CM

## 2024-09-20 DIAGNOSIS — K42.9 UMBILICAL HERNIA WITHOUT OBSTRUCTION AND WITHOUT GANGRENE: Primary | ICD-10-CM

## 2024-09-20 RX ORDER — BUPRENORPHINE HYDROCHLORIDE AND NALOXONE HYDROCHLORIDE DIHYDRATE 8; 2 MG/1; MG/1
1 TABLET SUBLINGUAL DAILY
COMMUNITY

## 2024-09-20 RX ORDER — SEMAGLUTIDE 0.68 MG/ML
INJECTION, SOLUTION SUBCUTANEOUS
COMMUNITY
Start: 2024-07-18

## 2024-09-20 RX ORDER — SEMAGLUTIDE 1.34 MG/ML
INJECTION, SOLUTION SUBCUTANEOUS
COMMUNITY
Start: 2024-09-12

## 2024-09-20 RX ORDER — DAPAGLIFLOZIN 10 MG/1
TABLET, FILM COATED ORAL
COMMUNITY
Start: 2024-09-12

## 2024-09-20 RX ORDER — CHOLECALCIFEROL (VITAMIN D3) 1250 MCG
CAPSULE ORAL
COMMUNITY
Start: 2024-08-04

## 2024-09-20 RX ORDER — INSULIN GLARGINE 100 [IU]/ML
INJECTION, SOLUTION SUBCUTANEOUS
COMMUNITY
Start: 2024-09-14

## 2024-09-20 RX ORDER — INSULIN LISPRO 100 [IU]/ML
INJECTION, SOLUTION INTRAVENOUS; SUBCUTANEOUS
COMMUNITY
Start: 2024-07-31

## 2024-09-20 RX ORDER — FLUTICASONE PROPIONATE 50 MCG
SPRAY, SUSPENSION (ML) NASAL
COMMUNITY
Start: 2024-08-30

## 2024-09-20 RX ORDER — LOSARTAN POTASSIUM 25 MG/1
TABLET ORAL
COMMUNITY
Start: 2024-07-21

## 2024-09-20 RX ORDER — LOSARTAN POTASSIUM 50 MG/1
TABLET ORAL
COMMUNITY
Start: 2024-07-21

## 2024-09-20 RX ORDER — ERYTHROMYCIN 5 MG/G
OINTMENT OPHTHALMIC
COMMUNITY
Start: 2024-07-26

## 2024-09-20 RX ORDER — LORATADINE 10 MG/1
TABLET ORAL
COMMUNITY
Start: 2024-07-11

## 2024-09-20 RX ORDER — LEVOTHYROXINE SODIUM 75 UG/1
TABLET ORAL
COMMUNITY
Start: 2024-09-11

## 2024-09-20 RX ORDER — INSULIN ASPART 100 [IU]/ML
INJECTION, SOLUTION INTRAVENOUS; SUBCUTANEOUS
COMMUNITY
Start: 2024-07-11

## 2024-10-21 ENCOUNTER — OFFICE VISIT (OUTPATIENT)
Dept: ORTHOPEDIC SURGERY | Facility: CLINIC | Age: 59
End: 2024-10-21
Payer: MEDICARE

## 2024-10-21 VITALS — HEART RATE: 76 BPM | SYSTOLIC BLOOD PRESSURE: 152 MMHG | DIASTOLIC BLOOD PRESSURE: 77 MMHG | OXYGEN SATURATION: 96 %

## 2024-10-21 DIAGNOSIS — M75.101 NONTRAUMATIC TEAR OF RIGHT ROTATOR CUFF, UNSPECIFIED TEAR EXTENT: Primary | ICD-10-CM

## 2024-10-21 DIAGNOSIS — M19.011 PRIMARY OSTEOARTHRITIS OF RIGHT SHOULDER: ICD-10-CM

## 2024-10-21 PROCEDURE — 99213 OFFICE O/P EST LOW 20 MIN: CPT | Performed by: PHYSICIAN ASSISTANT

## 2024-10-21 PROCEDURE — 1159F MED LIST DOCD IN RCRD: CPT | Performed by: PHYSICIAN ASSISTANT

## 2024-10-21 PROCEDURE — 20610 DRAIN/INJ JOINT/BURSA W/O US: CPT | Performed by: PHYSICIAN ASSISTANT

## 2024-10-21 PROCEDURE — 1160F RVW MEDS BY RX/DR IN RCRD: CPT | Performed by: PHYSICIAN ASSISTANT

## 2024-10-21 RX ORDER — TRIAMCINOLONE ACETONIDE 40 MG/ML
40 INJECTION, SUSPENSION INTRA-ARTICULAR; INTRAMUSCULAR
Status: COMPLETED | OUTPATIENT
Start: 2024-10-21 | End: 2024-10-21

## 2024-10-21 RX ORDER — LIDOCAINE HYDROCHLORIDE 10 MG/ML
5 INJECTION, SOLUTION EPIDURAL; INFILTRATION; INTRACAUDAL; PERINEURAL
Status: COMPLETED | OUTPATIENT
Start: 2024-10-21 | End: 2024-10-21

## 2024-10-21 RX ADMIN — TRIAMCINOLONE ACETONIDE 40 MG: 40 INJECTION, SUSPENSION INTRA-ARTICULAR; INTRAMUSCULAR at 14:36

## 2024-10-21 RX ADMIN — LIDOCAINE HYDROCHLORIDE 5 ML: 10 INJECTION, SOLUTION EPIDURAL; INFILTRATION; INTRACAUDAL; PERINEURAL at 14:36

## 2024-11-27 ENCOUNTER — TRANSCRIBE ORDERS (OUTPATIENT)
Dept: ADMINISTRATIVE | Facility: HOSPITAL | Age: 59
End: 2024-11-27
Payer: MEDICARE

## 2024-11-27 ENCOUNTER — LAB (OUTPATIENT)
Facility: HOSPITAL | Age: 59
End: 2024-11-27
Payer: MEDICARE

## 2024-11-27 DIAGNOSIS — N18.30 STAGE 3 CHRONIC KIDNEY DISEASE, UNSPECIFIED WHETHER STAGE 3A OR 3B CKD: ICD-10-CM

## 2024-11-27 DIAGNOSIS — I10 ESSENTIAL HYPERTENSION, MALIGNANT: ICD-10-CM

## 2024-11-27 DIAGNOSIS — R80.8 OTHER PROTEINURIA: ICD-10-CM

## 2024-11-27 DIAGNOSIS — N18.30 STAGE 3 CHRONIC KIDNEY DISEASE, UNSPECIFIED WHETHER STAGE 3A OR 3B CKD: Primary | ICD-10-CM

## 2024-11-27 LAB
ALBUMIN SERPL-MCNC: 3.4 G/DL (ref 3.5–5.2)
ALBUMIN/GLOB SERPL: 1.3 G/DL
ALP SERPL-CCNC: 82 U/L (ref 39–117)
ALT SERPL W P-5'-P-CCNC: 17 U/L (ref 1–33)
ANION GAP SERPL CALCULATED.3IONS-SCNC: 9.3 MMOL/L (ref 5–15)
AST SERPL-CCNC: 15 U/L (ref 1–32)
BASOPHILS # BLD AUTO: 0.04 10*3/MM3 (ref 0–0.2)
BASOPHILS NFR BLD AUTO: 0.6 % (ref 0–1.5)
BILIRUB SERPL-MCNC: 0.2 MG/DL (ref 0–1.2)
BUN SERPL-MCNC: 31 MG/DL (ref 6–20)
BUN/CREAT SERPL: 13.5 (ref 7–25)
CALCIUM SPEC-SCNC: 10 MG/DL (ref 8.6–10.5)
CHLORIDE SERPL-SCNC: 106 MMOL/L (ref 98–107)
CO2 SERPL-SCNC: 22.7 MMOL/L (ref 22–29)
CREAT SERPL-MCNC: 2.29 MG/DL (ref 0.57–1)
DEPRECATED RDW RBC AUTO: 44.6 FL (ref 37–54)
EGFRCR SERPLBLD CKD-EPI 2021: 24.4 ML/MIN/1.73
EOSINOPHIL # BLD AUTO: 0.37 10*3/MM3 (ref 0–0.4)
EOSINOPHIL NFR BLD AUTO: 5.6 % (ref 0.3–6.2)
ERYTHROCYTE [DISTWIDTH] IN BLOOD BY AUTOMATED COUNT: 14.4 % (ref 12.3–15.4)
GLOBULIN UR ELPH-MCNC: 2.7 GM/DL
GLUCOSE SERPL-MCNC: 128 MG/DL (ref 65–99)
HCT VFR BLD AUTO: 40.6 % (ref 34–46.6)
HGB BLD-MCNC: 13.5 G/DL (ref 12–15.9)
IMM GRANULOCYTES # BLD AUTO: 0.01 10*3/MM3 (ref 0–0.05)
IMM GRANULOCYTES NFR BLD AUTO: 0.2 % (ref 0–0.5)
LYMPHOCYTES # BLD AUTO: 1.45 10*3/MM3 (ref 0.7–3.1)
LYMPHOCYTES NFR BLD AUTO: 21.9 % (ref 19.6–45.3)
MCH RBC QN AUTO: 28.4 PG (ref 26.6–33)
MCHC RBC AUTO-ENTMCNC: 33.3 G/DL (ref 31.5–35.7)
MCV RBC AUTO: 85.3 FL (ref 79–97)
MONOCYTES # BLD AUTO: 0.36 10*3/MM3 (ref 0.1–0.9)
MONOCYTES NFR BLD AUTO: 5.4 % (ref 5–12)
NEUTROPHILS NFR BLD AUTO: 4.38 10*3/MM3 (ref 1.7–7)
NEUTROPHILS NFR BLD AUTO: 66.3 % (ref 42.7–76)
NRBC BLD AUTO-RTO: 0 /100 WBC (ref 0–0.2)
PLATELET # BLD AUTO: 244 10*3/MM3 (ref 140–450)
PMV BLD AUTO: 10.1 FL (ref 6–12)
POTASSIUM SERPL-SCNC: 4.8 MMOL/L (ref 3.5–5.2)
PROT SERPL-MCNC: 6.1 G/DL (ref 6–8.5)
RBC # BLD AUTO: 4.76 10*6/MM3 (ref 3.77–5.28)
SODIUM SERPL-SCNC: 138 MMOL/L (ref 136–145)
WBC NRBC COR # BLD AUTO: 6.61 10*3/MM3 (ref 3.4–10.8)

## 2024-11-27 PROCEDURE — 82610 CYSTATIN C: CPT

## 2024-11-27 PROCEDURE — 82784 ASSAY IGA/IGD/IGG/IGM EACH: CPT

## 2024-11-27 PROCEDURE — 80053 COMPREHEN METABOLIC PANEL: CPT

## 2024-11-27 PROCEDURE — 36415 COLL VENOUS BLD VENIPUNCTURE: CPT

## 2024-11-27 PROCEDURE — 86334 IMMUNOFIX E-PHORESIS SERUM: CPT

## 2024-11-27 PROCEDURE — 83521 IG LIGHT CHAINS FREE EACH: CPT

## 2024-11-27 PROCEDURE — 84165 PROTEIN E-PHORESIS SERUM: CPT

## 2024-11-27 PROCEDURE — 85025 COMPLETE CBC W/AUTO DIFF WBC: CPT

## 2024-11-29 LAB
ALBUMIN SERPL ELPH-MCNC: 2.9 G/DL (ref 2.9–4.4)
ALBUMIN/GLOB SERPL: 1 {RATIO} (ref 0.7–1.7)
ALPHA1 GLOB SERPL ELPH-MCNC: 0.3 G/DL (ref 0–0.4)
ALPHA2 GLOB SERPL ELPH-MCNC: 1.2 G/DL (ref 0.4–1)
B-GLOBULIN SERPL ELPH-MCNC: 1 G/DL (ref 0.7–1.3)
GAMMA GLOB SERPL ELPH-MCNC: 0.5 G/DL (ref 0.4–1.8)
GLOBULIN SER CALC-MCNC: 3 G/DL (ref 2.2–3.9)
KAPPA LC FREE SER-MCNC: 60.5 MG/L (ref 3.3–19.4)
KAPPA LC FREE/LAMBDA FREE SER: 2.06 {RATIO} (ref 0.26–1.65)
LABORATORY COMMENT REPORT: ABNORMAL
LAMBDA LC FREE SERPL-MCNC: 29.4 MG/L (ref 5.7–26.3)
M PROTEIN SERPL ELPH-MCNC: ABNORMAL G/DL
PROT PATTERN SERPL ELPH-IMP: ABNORMAL
PROT SERPL-MCNC: 5.9 G/DL (ref 6–8.5)

## 2024-11-30 LAB — CYSTATIN C SERPL-MCNC: 2.81 MG/L (ref 0.67–1.14)

## 2024-12-02 LAB
IGA SERPL-MCNC: 169 MG/DL (ref 87–352)
IGG SERPL-MCNC: 603 MG/DL (ref 586–1602)
IGM SERPL-MCNC: 32 MG/DL (ref 26–217)
PROT PATTERN SERPL IFE-IMP: NORMAL

## 2025-02-06 ENCOUNTER — OFFICE VISIT (OUTPATIENT)
Dept: ORTHOPEDIC SURGERY | Facility: CLINIC | Age: 60
End: 2025-02-06
Payer: MEDICARE

## 2025-02-06 VITALS — HEART RATE: 73 BPM | SYSTOLIC BLOOD PRESSURE: 157 MMHG | DIASTOLIC BLOOD PRESSURE: 77 MMHG | OXYGEN SATURATION: 95 %

## 2025-02-06 DIAGNOSIS — M75.101 NONTRAUMATIC TEAR OF RIGHT ROTATOR CUFF, UNSPECIFIED TEAR EXTENT: Primary | ICD-10-CM

## 2025-02-06 DIAGNOSIS — M19.011 PRIMARY OSTEOARTHRITIS OF RIGHT SHOULDER: ICD-10-CM

## 2025-02-06 RX ORDER — LIDOCAINE HYDROCHLORIDE 10 MG/ML
5 INJECTION, SOLUTION INFILTRATION; PERINEURAL
Status: COMPLETED | OUTPATIENT
Start: 2025-02-06 | End: 2025-02-06

## 2025-02-06 RX ORDER — TRIAMCINOLONE ACETONIDE 40 MG/ML
40 INJECTION, SUSPENSION INTRA-ARTICULAR; INTRAMUSCULAR
Status: COMPLETED | OUTPATIENT
Start: 2025-02-06 | End: 2025-02-06

## 2025-02-06 RX ADMIN — LIDOCAINE HYDROCHLORIDE 5 ML: 10 INJECTION, SOLUTION INFILTRATION; PERINEURAL at 14:05

## 2025-02-06 RX ADMIN — TRIAMCINOLONE ACETONIDE 40 MG: 40 INJECTION, SUSPENSION INTRA-ARTICULAR; INTRAMUSCULAR at 14:05

## 2025-02-06 NOTE — PROGRESS NOTES
Chief Complaint  Follow-up of the Right Shoulder    Subjective          History of Present Illness      Desi Krishnan is a 60 y.o. female  presents to [facility] for     Patient presents for follow-up evaluation of right shoulder pain right shoulder osteoarthritis and rotator cuff tear.  She is a resident at [facility].  She states her shoulder pain continues to cause her concern and it takes her at night she is in a wheelchair due to bilateral leg amputations she states that the injections are helping her shoulder she would like to continue conservative treatment.      Allergies   Allergen Reactions    Hydrochlorothiazide     Penicillin G Hives    Penicillins     Vancomycin         Social History     Socioeconomic History    Marital status: Single   Tobacco Use    Smoking status: Former     Current packs/day: 0.00     Average packs/day: 0.5 packs/day for 15.3 years (7.7 ttl pk-yrs)     Types: Cigarettes     Start date: 3/13/1998     Quit date: 7/15/2013     Years since quittin.5    Smokeless tobacco: Never   Vaping Use    Vaping status: Never Used   Substance and Sexual Activity    Alcohol use: No    Drug use: Defer    Sexual activity: Defer        REVIEW OF SYSTEMS    Constitutional: Awake alert and oriented x3, no acute distress, denies fevers, chills, weight loss  Respiratory: No respiratory distress  Vascular: Brisk cap refill, Intact distal pulses, No cyanosis, compartments soft with no signs or symptoms of compartment syndrome or DVT.   Cardiovascular: Denies chest pain, shortness of breath  Skin: Denies rashes, acute skin changes  Neurologic: Denies headache, loss of consciousness  MSK: Right shoulder pain      Objective   Vital Signs:   /77   Pulse 73   SpO2 95%     There is no height or weight on file to calculate BMI.    Physical Exam       Right shoulder: Tender palpation anterior lateral shoulder skin is intact, no erythema, no ecchymosis or swelling, no  atrophy or signs of infection, 4 out of 5 strength, positive impingement testing, active forward elevation 160 with mild pain active abduction 110 with mild pain crepitus with range of motion, neurovascular intact       Large Joint: R subacromial bursa  Date/Time: 2/6/2025 2:05 PM  Consent given by: patient  Site marked: site marked  Timeout: Immediately prior to procedure a time out was called to verify the correct patient, procedure, equipment, support staff and site/side marked as required   Supporting Documentation  Indications: pain   Procedure Details  Location: shoulder - R subacromial bursa  Preparation: Patient was prepped and draped in the usual sterile fashion  Needle gauge: 21g.  Medications administered: 5 mL lidocaine 1 %; 40 mg triamcinolone acetonide 40 MG/ML  Patient tolerance: patient tolerated the procedure well with no immediate complications      This injection documentation was Scribed for TIFF Watson by Massiel Guaman MA.  02/06/25   14:06 EST    Imaging Results (Most Recent)       None             Result Review :   The following data was reviewed by: TIFF Watson on 02/06/2025:               Assessment and Plan    Diagnoses and all orders for this visit:    1. Nontraumatic tear of right rotator cuff, unspecified tear extent (Primary)  -     Large Joint: R subacromial bursa    2. Primary osteoarthritis of right shoulder  -     Large Joint: R subacromial bursa        Discussed diagnosis and treatment options with the patient she elected to have right shoulder steroid injection.  A sterile prep of the injection site was performed with chloraprep. Cryospray was used for local anesthesia. The site was injected. The patient tolerated the procedure well without complications. Post injection pain was discussed.    The risks, benefits, complications, treatment options/alternatives, and expected outcomes/goals were discussed with the patient.   Follow-up in 3  months    Call or return if worsening symptoms.    Follow Up   Return in about 3 months (around 5/6/2025) for Recheck.  Patient was given instructions and counseling regarding her condition or for health maintenance advice. Please see specific information pulled into the AVS if appropriate.       EMR Dragon/Transcription disclaimer:  Part of this note may be an electronic transcription/translation of spoken language to printed text using the Dragon Dictation System

## 2025-03-19 ENCOUNTER — OFFICE VISIT (OUTPATIENT)
Dept: CARDIOLOGY | Facility: CLINIC | Age: 60
End: 2025-03-19
Payer: MEDICARE

## 2025-03-19 VITALS
HEIGHT: 62 IN | BODY MASS INDEX: 53.92 KG/M2 | DIASTOLIC BLOOD PRESSURE: 76 MMHG | WEIGHT: 293 LBS | HEART RATE: 77 BPM | SYSTOLIC BLOOD PRESSURE: 131 MMHG

## 2025-03-19 DIAGNOSIS — I51.7 LVH (LEFT VENTRICULAR HYPERTROPHY): ICD-10-CM

## 2025-03-19 DIAGNOSIS — Z89.511 S/P BILATERAL BELOW KNEE AMPUTATION: ICD-10-CM

## 2025-03-19 DIAGNOSIS — Z89.512 S/P BILATERAL BELOW KNEE AMPUTATION: ICD-10-CM

## 2025-03-19 DIAGNOSIS — I50.30: Primary | ICD-10-CM

## 2025-03-19 DIAGNOSIS — R06.02 SOB (SHORTNESS OF BREATH): ICD-10-CM

## 2025-03-19 RX ORDER — FUROSEMIDE 20 MG/1
20 TABLET ORAL 2 TIMES DAILY
COMMUNITY

## 2025-03-19 NOTE — PROGRESS NOTES
[facility]  INTERVENTIONAL CARDIOLOGY NEW PATIENT OFFICE VISIT      Chief Complaint  Congestive Heart Failure and Establish Care    Subjective          Congestive Heart Failure        Desi Krishnan is a 60 y.o. femalewho presents to cardiology clinic as a new patient visit.      Patient has history of morbid obesity, hypertension, hyperlipidemia, type 2 diabetes on insulin, who was referred for abnormal echocardiogram findings.  Patient had echocardiogram on 2/25/2025 which mentioned ejection fraction 45% with severe left ventricular hypertrophy with normal LV cavity size, normal wall motion, left atrial enlargement, moderate mitral regurgitation.  Her EKG from today however shows sinus rhythm with no features of LVH.  She has low voltage in precordial leads.  Patient denies excessive shortness of breath other than her baseline.  Her blood pressure is well-controlled at 131/76 with heart rate of 77.  Patient takes amlodipine 10 mg daily, Lasix 20 mg twice daily and losartan 100 mg daily.  She also has CKD stage IIIb/IV and follows up with nephrology, not yet on dialysis.    Past History:  Past Medical History:   Diagnosis Date    Diabetes     Hypertension        Medical History:  Past Medical History:   Diagnosis Date    Diabetes     Hypertension        Surgical History:   has a past surgical history that includes Knee surgery and Tubal ligation.     Family History:   family history includes Heart disease in her father.     Social History:   reports that she quit smoking about 11 years ago. Her smoking use included cigarettes. She started smoking about 27 years ago. She has a 7.7 pack-year smoking history. She has never used smokeless tobacco. Drug use questions deferred to the physician. She reports that she does not drink alcohol.    Allergies:   Hydrochlorothiazide, Penicillin g, Penicillins, and Vancomycin    Current Outpatient Medications on File Prior to Visit   Medication Sig    acetaminophen  (TYLENOL) 325 MG tablet     amLODIPine (NORVASC) 10 MG tablet     atorvastatin (LIPITOR) 40 MG tablet     cyclobenzaprine (FLEXERIL) 10 MG tablet     erythromycin (ROMYCIN) 5 MG/GM ophthalmic ointment     Farxiga 10 MG tablet     fluticasone (FLONASE) 50 MCG/ACT nasal spray     furosemide (LASIX) 20 MG tablet Take 1 tablet by mouth 2 (Two) Times a Day.    gabapentin (NEURONTIN) 400 MG capsule     Glucagon HCl (Glucagon Emergency) 1 MG/ML reconstituted solution     HYDROcodone-acetaminophen (NORCO) 5-325 MG per tablet Take 1 tablet by mouth Every 4 (Four) Hours As Needed.    Insulin Aspart FlexPen 100 UNIT/ML solution pen-injector     Insulin Glargine (BASAGLAR KWIKPEN) 100 UNIT/ML injection pen     Insulin Lispro (humaLOG) 100 UNIT/ML injection     levothyroxine (SYNTHROID, LEVOTHROID) 50 MCG tablet  (Patient taking differently: Take 1.5 tablets by mouth Every Morning.)    levothyroxine (SYNTHROID, LEVOTHROID) 75 MCG tablet     losartan (COZAAR) 100 MG tablet     magnesium hydroxide (MILK OF MAGNESIA) 400 MG/5ML suspension Take  by mouth Daily As Needed for Constipation.    Ozempic, 1 MG/DOSE, 4 MG/3ML solution pen-injector     sore muscle (ICY HOT EXTRA STRENGTH) 10-30 % cream cream Apply 1 Application topically to the appropriate area as directed 3 (Three) Times a Day As Needed.    tuberculin 5 UNIT/0.1ML injection Inject  into the appropriate area of the skin as directed by provider 1 (One) Time.    vitamin D (ERGOCALCIFEROL) 1.25 MG (67069 UT) capsule capsule     buprenorphine-naloxone (SUBOXONE) 8-2 MG per SL tablet Place 1 tablet under the tongue Daily.    Cholecalciferol (Vitamin D3) 1.25 MG (99886 UT) capsule  (Patient not taking: Reported on 9/20/2024)    glucagon (GLUCAGEN) 1 MG injection Inject 1 mg under the skin into the appropriate area as directed 1 (One) Time As Needed for Low Blood Sugar.    Invokana 100 MG tablet tablet  (Patient not taking: Reported on 3/19/2025)    loratadine (CLARITIN) 10 MG  "tablet     metFORMIN (GLUCOPHAGE) 500 MG tablet  (Patient not taking: Reported on 3/19/2025)    Ozempic, 0.25 or 0.5 MG/DOSE, 2 MG/3ML solution pen-injector  (Patient not taking: Reported on 3/19/2025)    Rybelsus 14 MG tablet  (Patient not taking: Reported on 3/19/2025)    [DISCONTINUED] cloNIDine (CATAPRES) 0.1 MG tablet  (Patient not taking: Reported on 9/20/2024)    [DISCONTINUED] losartan (COZAAR) 25 MG tablet  (Patient not taking: Reported on 3/19/2025)    [DISCONTINUED] losartan (COZAAR) 50 MG tablet  (Patient not taking: Reported on 3/19/2025)     No current facility-administered medications on file prior to visit.          Review of Systems   Negative ROS except as mentioned in HPI above.     Objective     /76   Pulse 77   Ht 157.5 cm (62\")   Wt (!) 139 kg (306 lb)   BMI 55.97 kg/m²       Physical Exam  General : Alert, awake, no acute distress, morbidly obese  Neck : Supple, no carotid bruit, no jugular venous distention  CVS : Regular rate and rhythm, no murmur, rubs or gallops  Lungs: Clear to auscultation bilaterally, no crackles or rhonchi  Abdomen: Soft, nontender, bowel sounds heard in all 4 quadrants  Extremities: Bilateral leg amputation      Result Review :     The following data was reviewed by: Siena Arteaga MD on 03/19/2025:    CMP          5/9/2024    12:00 11/27/2024    09:45 1/23/2025    15:40   CMP   Glucose 152  128  106    BUN 33  31  35    Creatinine 1.94  2.29  2.59     2.59    EGFR 29.7  24.4  21.0     21.0    Sodium 139  138  143    Potassium 5.4  4.8  5.5    Chloride 107  106  110    Calcium 9.0  10.0  9.8    Total Protein 5.5  6.1     5.9     Albumin 3.0  3.4     2.9     Globulin 2.5  2.7     3.0     Total Bilirubin 0.3  0.2     Alkaline Phosphatase 82  82     AST (SGOT) 9  15     ALT (SGPT) 13  17     Albumin/Globulin Ratio 1.2  1.3     1.0     BUN/Creatinine Ratio 17.0  13.5  13.5    Anion Gap 11.6  9.3  11.8      CBC          5/9/2024    12:00 11/27/2024    09:45 "   CBC   WBC 6.02  6.61    RBC 4.38  4.76    Hemoglobin 12.2  13.5    Hematocrit 39.5  40.6    MCV 90.2  85.3    MCH 27.9  28.4    MCHC 30.9  33.3    RDW 14.6  14.4    Platelets 237  244               Data reviewed: Cardiology studies        No results found for this or any previous visit.            ECG 12 Lead    Date/Time: 3/19/2025 5:01 PM  Performed by: Siena Arteaga MD    Authorized by: Siena Arteaga MD  Previous ECG: no previous ECG available  Rhythm: sinus rhythm  Rate: normal  Conduction: left anterior fascicular block  QRS axis: left  Other findings: low voltage    Clinical impression: abnormal EKG  Comments: Sinus rhythm with left intrafascicular block with low voltage in precordial leads.  No features of LVH.          The ASCVD Risk score (Jim DON, et al., 2019) failed to calculate for the following reasons:    Cannot find a previous HDL lab    Cannot find a previous total cholesterol lab         Assessment and Plan      Diagnoses and all orders for this visit:    1. CHF (congestive heart failure), NYHA class II, unspecified failure chronicity, diastolic (Primary)  -     Adult Transthoracic Echo Complete w/ Color, Spectral and Contrast if necessary per protocol; Future  -     BNP; Future  -     PYP Imaging for Cardiac Amyloidosis; Future    2. LVH (left ventricular hypertrophy)  -     Adult Transthoracic Echo Complete w/ Color, Spectral and Contrast if necessary per protocol; Future  -     BNP; Future  -     PYP Imaging for Cardiac Amyloidosis; Future    3. SOB (shortness of breath)  -     Adult Transthoracic Echo Complete w/ Color, Spectral and Contrast if necessary per protocol; Future  -     BNP; Future  -     PYP Imaging for Cardiac Amyloidosis; Future    4. S/P bilateral below knee amputation    Other orders  -     ECG 12 Lead        Plan  - Patient with abnormal echocardiogram showing LVH but EKG without features of LVH.  I will repeat echocardiogram.  Review of previous chart shows  increased kappa/lambda ratio.  I will obtain PYP scan for further evaluation of suspected amyloidosis.  - Will obtain BNP.    Will follow-up in 4 months or sooner if needed.    Patient was educated on cardiac diet, adequate exercise and achieving/maintaining optimal weight.    Desi Krishnan  reports that she quit smoking about 11 years ago. Her smoking use included cigarettes. She started smoking about 27 years ago. She has a 7.7 pack-year smoking history. She has never used smokeless tobacco.         Follow Up     Return in about 4 months (around 7/19/2025) for Next scheduled follow up.        I spent 50 minutes caring for this patient on this date of service. This time includes time spent by me in the following activities: preparing for the visit, reviewing tests, obtaining and/or reviewing a separately obtained history, performing a medically appropriate examination and/or evaluation , counseling and educating the patient/family/caregiver, ordering medications, tests, or procedures, referring and communicating with other health care professionals , documenting information in the medical record, independently interpreting results and communicating that information with the patient/family/caregiver, and/or care coordination.    I have reviewed the family history, social history, and past medical history for this patient. Previous information and data has been reviewed and updated as needed. I have reviewed and verified the chief complaint, history, and other documentation. The patient was interviewed and examined in the clinic and the chart reviewed. The previous observations, recommendations, and conclusions were reviewed including those of other providers.     The plan was discussed with the patient and/or family. The patient was given time to ask questions and these questions were answered. At the conclusion of their visit they had no additional questions or concerns and all questions were answered to their  satisfaction.     Patient was given instructions and counseling regarding her condition or for health maintenance advice. Please see specific information pulled into the AVS if appropriate.      Siena Arteaga MD, FACC  03/19/25  16:55 EDT    Dictated Utilizing Dragon Dictation

## 2025-03-19 NOTE — LETTER
March 19, 2025     Alba Guzman MD  [id number] [facility]  [address]  [facility] [place] [facility] [id number]    Patient: Desi Krishnan   YOB: 1965   Date of Visit: 3/19/2025       Dear Alba Guzman MD,    Desi Krishnan was in my office today. Below are the relevant portions of my assessment and plan of care.           If you have questions, please do not hesitate to call me. I look forward to following Desi along with you.         Sincerely,        Siena Arteaga MD        CC: MD Danita Kim APRN

## 2025-03-24 ENCOUNTER — OFFICE VISIT (OUTPATIENT)
Dept: SURGERY | Facility: CLINIC | Age: 60
End: 2025-03-24
Payer: MEDICARE

## 2025-03-24 VITALS
DIASTOLIC BLOOD PRESSURE: 53 MMHG | SYSTOLIC BLOOD PRESSURE: 147 MMHG | RESPIRATION RATE: 18 BRPM | HEIGHT: 62 IN | HEART RATE: 77 BPM | BODY MASS INDEX: 55.97 KG/M2

## 2025-03-24 DIAGNOSIS — E66.3 OVERWEIGHT: ICD-10-CM

## 2025-03-24 DIAGNOSIS — K42.9 UMBILICAL HERNIA WITHOUT OBSTRUCTION AND WITHOUT GANGRENE: Primary | ICD-10-CM

## 2025-03-24 PROCEDURE — 1160F RVW MEDS BY RX/DR IN RCRD: CPT | Performed by: SURGERY

## 2025-03-24 PROCEDURE — 99213 OFFICE O/P EST LOW 20 MIN: CPT | Performed by: SURGERY

## 2025-03-24 PROCEDURE — 1159F MED LIST DOCD IN RCRD: CPT | Performed by: SURGERY

## 2025-03-24 NOTE — PROGRESS NOTES
Chief Complaint:  Umbilical hernia without obstruction and without gangrene    Primary Care Provider: Alba Guzman MD      History of Present Illness  Desi Krishnan is a 60 y.o. female here again for her umbilical hernia.  She was told to see me for a 6 month visit for progress check on weight loss.  I last saw her on 9/20/24.  A copy of the HPI and Plan from my office note from that day is available below.  Patient weighs 306 pounds today.  Weight was 306 pounds when I last saw her in September 2024.  Weight was 327 pounds the first time I saw her in June 2022.  The patient's weights are self-reported from [place] where she stays.  I talked about losing weight again but not very long as I have had this discussion multiple times in the past.  Patient still has no pain or any problems from the hernia.  She says her abdomen is not bothering her at all.      Copy of HPI from Office Note from 9/20/24  Desi Krishnan is a 59 y.o. female here again for her umbilical hernia.  She was told to see me for a 6-month visit for progress check on weight loss.  I last saw her on 3/19/24.  A copy of my office note from that day is available below.  Patient weighs 306 pounds today.  Weight was 292 pounds when I last saw her in March 2024.  Weight was 327 pounds the first time I saw her in June 2022.  Patient says the reason she gained weight since last time I saw her is that she never did switch to another GLP 1 medication.  She is now taking Ozempic again which is the same GLP-1 medication she was originally on and stopped taking.  Nevertheless, the hernia is still not bothering the patient in any way at all and she is still motivated to lose weight.  Patient still has no pain or any problems from the hernia.     Copy of Plan from Office Note from 9/20/24  Umbilical hernia discussed.  Hernia remains completely asymptomatic and patient has not been successful at losing any weight.  I encouraged her again to try to  lose weight.  We talked about a goal of losing 30 pounds minimum.    Patient says she is going to start a GLP-1 medication again and work very hard to lose weight.  She will see me again in 6 months for a progress check     Copy of Office Note from 3/19/24  Desi Krishnan is a 59 y.o. female here for umbilical hernia.  I last saw her on 9/18/23.  A copy of that office note is available below.  Patient says he weighs about 292 pounds today.  Her weight was 288 pounds when I last saw here in September 2023.  Weight was 327 pounds the first time I saw her.  She says she was taking Ozempic the last time I saw her but she had to stop taking because of some type of insurance issues.  The hernia is not bothering the patient in any way.  Talked about the hernia again today and we talked about her weight.  See plans to see her primary care doctor and try to get on a different GLP-1 medicine.     Copy of Office Note from 9/18/23  Patient is here for follow-up regarding umbilical hernia.  She was previously seen by me on 6/21/2022, 9/20/2022, and 3/20/2023.  The hernia was not bothering the patient very much last time I saw her and is still is not bothering her very much.  She is continue to focus on weight loss.  Patient reports her weight at 288 pounds today.  Her weight was 327 pounds by initially saw her in June and was 310 pounds when I last saw her on 3/20/23.     Patient has no concerns today.  On exam, the umbilical hernia is still visible.  The hernia is soft and nontender.  I did not attempt to reduce the hernia.  My assessment is the patient has an umbilical hernia.   Patient is motivated to lose more weight.  Plan is to continue watchful observation and continue to focus on losing even more weight. She will see me again in 6 months.     Copy of Office Note from 3/20/23  Desi Krishnan is a 58 y.o. female here for follow-up after I last saw her on 9/20/22.  Following is a copy and paste of the HPI section of my  note from that office visit.  Patient had only lost 12 pounds when I last saw her on 9/20/2022.  Her weight was 319 pounds on that day.  She weighs 310 pounds today.  Patient does not have any concerns today.  She has not had much problem with her umbilical hernia since I last saw her.  On exam, the umbilical hernia is still visible.  The hernia is soft and nontender.  I did not repeat attempt to reduce the bulge.  My assessment is the patient has a umbilical hernia.  No plan to do surgery until she loses additional weight.  She is actively trying to lose weight.  She will see me again in 6 months.     Copy & Paste of HPI from Office Visit on 9/20/22  Desi Krishnan is a 57 y.o. female previously seen by me for a ventral hernia.  A copy and paste of the HPI and assessment and plan from prior office visit on 6/21/2022 is available below.  Patient has lost some weight.  She weighed 315 pounds when last weighed this week.  Previously she weighed 327 pounds.  She is making progress and is motivated to continue to lose more weight.  We talked about the importance of losing weight prior to addressing the hernia.  Hernia is not bothering her anymore than it was when I previously saw the patient.  No obstructive symptoms.     Copy & Paste of HPI from Office Visit on 6/21/22  Desi Krishnan is a 57 y.o. female referred by Alba Guzman MD [facility] for umbilical hernia repair.  Patient has a bulge at her umbilicus that is been present for at least 3 to 4 years.  She is having some skin irritation and she has occasional pain when she moves from her wheelchair to her bed and other movements.  No obstructive symptoms. only prior abdominal surgery is a tubal ligation.  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  He lives at one of the local nursing homes.     Copy & Paste of A/P from Office Visit on 6/21/22  Assessment:  Diagnoses and all orders for this  visit:  1. Umbilical hernia without obstruction and without gangrene (Primary)  2. Overweight  3. S/P amputation     Plan:  Diagnosis umbilical hernia was discussed.  I also discussed my concerns about umbilical hernia repair with the patient at her current weight.  I encouraged the patient to try to lose weight.  We talked about a goal of losing 30 pounds minimum.  Patient currently weighs about 327 pounds on 6/7/22.  Unable to weigh her in the office secondary to no equipment to accommodate her in her wheelchair.  We will have the patient see me again in 3 months to reassess.    Allergies: Hydrochlorothiazide, Penicillin g, Penicillins, and Vancomycin    Outpatient Medications Marked as Taking for the 3/24/25 encounter (Office Visit) with Zacarias Obando MD   Medication Sig Dispense Refill    acetaminophen (TYLENOL) 325 MG tablet       amLODIPine (NORVASC) 10 MG tablet       atorvastatin (LIPITOR) 40 MG tablet       buprenorphine-naloxone (SUBOXONE) 8-2 MG per SL tablet Place 1 tablet under the tongue Daily.      Cholecalciferol (Vitamin D3) 1.25 MG (74339 UT) capsule       cyclobenzaprine (FLEXERIL) 10 MG tablet       erythromycin (ROMYCIN) 5 MG/GM ophthalmic ointment       Farxiga 10 MG tablet       fluticasone (FLONASE) 50 MCG/ACT nasal spray       furosemide (LASIX) 20 MG tablet Take 1 tablet by mouth 2 (Two) Times a Day.      gabapentin (NEURONTIN) 400 MG capsule       glucagon (GLUCAGEN) 1 MG injection Inject 1 mg under the skin into the appropriate area as directed 1 (One) Time As Needed for Low Blood Sugar.      Glucagon HCl (Glucagon Emergency) 1 MG/ML reconstituted solution       HYDROcodone-acetaminophen (NORCO) 5-325 MG per tablet Take 1 tablet by mouth Every 4 (Four) Hours As Needed.      Insulin Aspart FlexPen 100 UNIT/ML solution pen-injector       Insulin Glargine (BASAGLAR KWIKPEN) 100 UNIT/ML injection pen       Insulin Lispro (humaLOG) 100 UNIT/ML injection       Invokana 100 MG tablet tablet    "    levothyroxine (SYNTHROID, LEVOTHROID) 50 MCG tablet  (Patient taking differently: Take 1.5 tablets by mouth Every Morning.)      levothyroxine (SYNTHROID, LEVOTHROID) 75 MCG tablet       loratadine (CLARITIN) 10 MG tablet       losartan (COZAAR) 100 MG tablet       magnesium hydroxide (MILK OF MAGNESIA) 400 MG/5ML suspension Take  by mouth Daily As Needed for Constipation.      metFORMIN (GLUCOPHAGE) 500 MG tablet       Rybelsus 14 MG tablet       sore muscle (ICY HOT EXTRA STRENGTH) 10-30 % cream cream Apply 1 Application topically to the appropriate area as directed 3 (Three) Times a Day As Needed.      tuberculin 5 UNIT/0.1ML injection Inject  into the appropriate area of the skin as directed by provider 1 (One) Time.      vitamin D (ERGOCALCIFEROL) 1.25 MG (58229 UT) capsule capsule          Past Medical History:    Diabetes    Hypertension        Past Surgical History:    KNEE SURGERY    TUBAL ABDOMINAL LIGATION       Family History:   Family History   Problem Relation Age of Onset    Heart disease Father         Social History:  Social History     Tobacco Use    Smoking status: Former     Current packs/day: 0.00     Average packs/day: 0.5 packs/day for 15.3 years (7.7 ttl pk-yrs)     Types: Cigarettes     Start date: 3/13/1998     Quit date: 7/15/2013     Years since quittin.6    Smokeless tobacco: Never   Substance Use Topics    Alcohol use: No       Objective     Vital Signs:  /53   Pulse 77   Resp 18   Ht 157.5 cm (62\")   BMI 55.97 kg/m²   Respiratory:  breathing not labored, respiratory effort appears normal  Cardiovascular:  heart regular rate  Abdomen:  soft, obese, nontender; soft, nontender umbilical hernia  Patient here alone      Assessment:  Diagnoses and all orders for this visit:    1. Umbilical hernia without obstruction and without gangrene (Primary)    2. Overweight        Plan:  Umbilical hernia diagnosis discussed.  The hernia is completely asymptomatic and the patient is " significantly overweight.  I discussed my concerns about umbilical hernia repair with the patient at her current weight.  I again encouraged the patient to try to lose weight.  Patient will see me again in 6 months for another checkup.      Zacarias Obando MD  03/24/2025    Electronically signed by Zacarias Obando MD, 03/24/25, 1:18 PM EDT.

## 2025-04-02 ENCOUNTER — TELEPHONE (OUTPATIENT)
Dept: CARDIOLOGY | Age: 60
End: 2025-04-02
Payer: MEDICARE

## 2025-04-03 ENCOUNTER — HOSPITAL ENCOUNTER (OUTPATIENT)
Dept: CARDIOLOGY | Facility: HOSPITAL | Age: 60
Discharge: HOME | End: 2025-04-03
Payer: MEDICARE

## 2025-04-03 ENCOUNTER — TELEPHONE (OUTPATIENT)
Dept: CARDIOLOGY | Facility: CLINIC | Age: 60
End: 2025-04-03
Payer: MEDICARE

## 2025-04-03 VITALS
BODY MASS INDEX: 53.92 KG/M2 | SYSTOLIC BLOOD PRESSURE: 147 MMHG | DIASTOLIC BLOOD PRESSURE: 53 MMHG | OXYGEN SATURATION: 94 % | HEIGHT: 62 IN | WEIGHT: 293 LBS | HEART RATE: 80 BPM

## 2025-04-03 DIAGNOSIS — I50.30: ICD-10-CM

## 2025-04-03 DIAGNOSIS — I51.7 LVH (LEFT VENTRICULAR HYPERTROPHY): ICD-10-CM

## 2025-04-03 DIAGNOSIS — R06.02 SOB (SHORTNESS OF BREATH): ICD-10-CM

## 2025-04-03 LAB
AORTIC ARCH: 2.5 CM
AORTIC DIMENSIONLESS INDEX: 0.65 (DI)
ASCENDING AORTA: 2.4 CM
AV MEAN PRESS GRAD SYS DOP V1V2: 9 MMHG
AV VMAX SYS DOP: 194 CM/SEC
BH CV ECHO MEAS - ACS: 1.31 CM
BH CV ECHO MEAS - AO MAX PG: 15.1 MMHG
BH CV ECHO MEAS - AO ROOT AREA (BSA CORRECTED): 1 CM2
BH CV ECHO MEAS - AO ROOT DIAM: 2.38 CM
BH CV ECHO MEAS - AO V2 VTI: 39.3 CM
BH CV ECHO MEAS - AVA(I,D): 1.63 CM2
BH CV ECHO MEAS - EDV(CUBED): 70.4 ML
BH CV ECHO MEAS - EDV(MOD-SP2): 81 ML
BH CV ECHO MEAS - EDV(MOD-SP4): 95 ML
BH CV ECHO MEAS - EF(MOD-SP2): 70.4 %
BH CV ECHO MEAS - EF(MOD-SP4): 80 %
BH CV ECHO MEAS - ESV(CUBED): 12.7 ML
BH CV ECHO MEAS - ESV(MOD-SP2): 24 ML
BH CV ECHO MEAS - ESV(MOD-SP4): 19 ML
BH CV ECHO MEAS - FS: 43.5 %
BH CV ECHO MEAS - IVS/LVPW: 1 CM
BH CV ECHO MEAS - IVSD: 1.36 CM
BH CV ECHO MEAS - LAT PEAK E' VEL: 10.2 CM/SEC
BH CV ECHO MEAS - LV DIASTOLIC VOL/BSA (35-75): 41.5 CM2
BH CV ECHO MEAS - LV MASS(C)D: 209.4 GRAMS
BH CV ECHO MEAS - LV MAX PG: 6.2 MMHG
BH CV ECHO MEAS - LV MEAN PG: 3 MMHG
BH CV ECHO MEAS - LV SYSTOLIC VOL/BSA (12-30): 8.3 CM2
BH CV ECHO MEAS - LV V1 MAX: 124 CM/SEC
BH CV ECHO MEAS - LV V1 VTI: 25.4 CM
BH CV ECHO MEAS - LVIDD: 4.1 CM
BH CV ECHO MEAS - LVIDS: 2.33 CM
BH CV ECHO MEAS - LVOT AREA: 2.5 CM2
BH CV ECHO MEAS - LVOT DIAM: 1.79 CM
BH CV ECHO MEAS - LVPWD: 1.36 CM
BH CV ECHO MEAS - MED PEAK E' VEL: 9.1 CM/SEC
BH CV ECHO MEAS - MV A DUR: 0.12 SEC
BH CV ECHO MEAS - MV A MAX VEL: 93.4 CM/SEC
BH CV ECHO MEAS - MV DEC SLOPE: 625.1 CM/SEC2
BH CV ECHO MEAS - MV DEC TIME: 0.22 SEC
BH CV ECHO MEAS - MV E MAX VEL: 143 CM/SEC
BH CV ECHO MEAS - MV E/A: 1.53
BH CV ECHO MEAS - MV MAX PG: 7.1 MMHG
BH CV ECHO MEAS - MV MEAN PG: 3.2 MMHG
BH CV ECHO MEAS - MV P1/2T: 59.8 MSEC
BH CV ECHO MEAS - MV V2 VTI: 30.8 CM
BH CV ECHO MEAS - MVA(P1/2T): 3.7 CM2
BH CV ECHO MEAS - MVA(VTI): 2.08 CM2
BH CV ECHO MEAS - PA ACC TIME: 0.11 SEC
BH CV ECHO MEAS - PA V2 MAX: 152 CM/SEC
BH CV ECHO MEAS - PULM A REVS DUR: 0.1 SEC
BH CV ECHO MEAS - PULM A REVS VEL: 29.6 CM/SEC
BH CV ECHO MEAS - PULM DIAS VEL: 50.1 CM/SEC
BH CV ECHO MEAS - PULM S/D: 0.75
BH CV ECHO MEAS - PULM SYS VEL: 37.7 CM/SEC
BH CV ECHO MEAS - QP/QS: 2.11
BH CV ECHO MEAS - RV MAX PG: 6.8 MMHG
BH CV ECHO MEAS - RV V1 MAX: 130.1 CM/SEC
BH CV ECHO MEAS - RV V1 VTI: 30.9 CM
BH CV ECHO MEAS - RVOT DIAM: 2.36 CM
BH CV ECHO MEAS - SV(LVOT): 64.2 ML
BH CV ECHO MEAS - SV(MOD-SP2): 57 ML
BH CV ECHO MEAS - SV(MOD-SP4): 76 ML
BH CV ECHO MEAS - SV(RVOT): 135.4 ML
BH CV ECHO MEAS - SVI(LVOT): 28 ML/M2
BH CV ECHO MEAS - SVI(MOD-SP2): 24.9 ML/M2
BH CV ECHO MEAS - SVI(MOD-SP4): 33.2 ML/M2
BH CV ECHO MEAS - TAPSE (>1.6): 2.34 CM
BH CV ECHO MEASUREMENTS AVERAGE E/E' RATIO: 14.82
BH CV XLRA - RV BASE: 3.4 CM
BH CV XLRA - RV LENGTH: 8 CM
BH CV XLRA - RV MID: 3.2 CM
BH CV XLRA - TDI S': 14.4 CM/SEC
LEFT ATRIUM VOLUME INDEX: 24.4 ML/M2
LV EF BIPLANE MOD: 75.1 %
SINUS: 2.38 CM
STJ: 2.12 CM

## 2025-04-03 PROCEDURE — 25510000001 PERFLUTREN 6.52 MG/ML SUSPENSION 2 ML VIAL: Performed by: INTERNAL MEDICINE

## 2025-04-03 PROCEDURE — 93306 TTE W/DOPPLER COMPLETE: CPT

## 2025-04-03 RX ADMIN — PERFLUTREN 2 ML: 6.52 INJECTION, SUSPENSION INTRAVENOUS at 10:43

## 2025-04-03 NOTE — TELEPHONE ENCOUNTER
Received call from Dominick from nuclear medicine regarding PYP scan. Per nuclear medicine their camera can not accommodate the patient body habitus. Reports even if camera could accommodate that dose given for scan would not obtain good images. Patient completing echo and then will be sent home.

## 2025-04-17 ENCOUNTER — APPOINTMENT (OUTPATIENT)
Dept: GENERAL RADIOLOGY | Facility: HOSPITAL | Age: 60
DRG: 682 | End: 2025-04-17
Payer: MEDICARE

## 2025-04-17 ENCOUNTER — HOSPITAL ENCOUNTER (INPATIENT)
Facility: HOSPITAL | Age: 60
LOS: 7 days | Discharge: NURSING, REHAB OR LONG-TERM CARE | DRG: 682 | End: 2025-04-25
Attending: EMERGENCY MEDICINE | Admitting: FAMILY MEDICINE
Payer: MEDICARE

## 2025-04-17 DIAGNOSIS — N17.9 ACUTE RENAL FAILURE, UNSPECIFIED ACUTE RENAL FAILURE TYPE: Primary | ICD-10-CM

## 2025-04-17 DIAGNOSIS — N39.0 ACUTE UTI: ICD-10-CM

## 2025-04-17 DIAGNOSIS — R91.8 RIGHT LOWER LOBE PULMONARY INFILTRATE: ICD-10-CM

## 2025-04-17 LAB
ALBUMIN SERPL-MCNC: 3.1 G/DL (ref 3.5–5.2)
ALBUMIN/GLOB SERPL: 1 G/DL
ALP SERPL-CCNC: 119 U/L (ref 39–117)
ALT SERPL W P-5'-P-CCNC: 22 U/L (ref 1–33)
ANION GAP SERPL CALCULATED.3IONS-SCNC: 14.5 MMOL/L (ref 5–15)
AST SERPL-CCNC: 24 U/L (ref 1–32)
BACTERIA UR QL AUTO: ABNORMAL /HPF
BASOPHILS # BLD AUTO: 0.03 10*3/MM3 (ref 0–0.2)
BASOPHILS NFR BLD AUTO: 0.3 % (ref 0–1.5)
BILIRUB SERPL-MCNC: 0.2 MG/DL (ref 0–1.2)
BILIRUB UR QL STRIP: NEGATIVE
BUN SERPL-MCNC: 78 MG/DL (ref 6–20)
BUN/CREAT SERPL: 14.4 (ref 7–25)
CALCIUM SPEC-SCNC: 8.5 MG/DL (ref 8.6–10.5)
CHLORIDE SERPL-SCNC: 103 MMOL/L (ref 98–107)
CLARITY UR: ABNORMAL
CO2 SERPL-SCNC: 16.5 MMOL/L (ref 22–29)
COLOR UR: YELLOW
CREAT SERPL-MCNC: 5.43 MG/DL (ref 0.57–1)
DEPRECATED RDW RBC AUTO: 53 FL (ref 37–54)
EGFRCR SERPLBLD CKD-EPI 2021: 8.6 ML/MIN/1.73
EOSINOPHIL # BLD AUTO: 0.11 10*3/MM3 (ref 0–0.4)
EOSINOPHIL NFR BLD AUTO: 1.1 % (ref 0.3–6.2)
ERYTHROCYTE [DISTWIDTH] IN BLOOD BY AUTOMATED COUNT: 15.8 % (ref 12.3–15.4)
GLOBULIN UR ELPH-MCNC: 3.1 GM/DL
GLUCOSE SERPL-MCNC: 217 MG/DL (ref 65–99)
GLUCOSE UR STRIP-MCNC: ABNORMAL MG/DL
GRAN CASTS URNS QL MICRO: ABNORMAL /LPF
HCT VFR BLD AUTO: 30.3 % (ref 34–46.6)
HGB BLD-MCNC: 9.1 G/DL (ref 12–15.9)
HGB UR QL STRIP.AUTO: ABNORMAL
HOLD SPECIMEN: NORMAL
HOLD SPECIMEN: NORMAL
HYALINE CASTS UR QL AUTO: ABNORMAL /LPF
IMM GRANULOCYTES # BLD AUTO: 0.03 10*3/MM3 (ref 0–0.05)
IMM GRANULOCYTES NFR BLD AUTO: 0.3 % (ref 0–0.5)
KETONES UR QL STRIP: NEGATIVE
LEUKOCYTE ESTERASE UR QL STRIP.AUTO: ABNORMAL
LYMPHOCYTES # BLD AUTO: 1.01 10*3/MM3 (ref 0.7–3.1)
LYMPHOCYTES NFR BLD AUTO: 10.3 % (ref 19.6–45.3)
MAGNESIUM SERPL-MCNC: 2.7 MG/DL (ref 1.6–2.6)
MCH RBC QN AUTO: 27.5 PG (ref 26.6–33)
MCHC RBC AUTO-ENTMCNC: 30 G/DL (ref 31.5–35.7)
MCV RBC AUTO: 91.5 FL (ref 79–97)
MONOCYTES # BLD AUTO: 0.82 10*3/MM3 (ref 0.1–0.9)
MONOCYTES NFR BLD AUTO: 8.4 % (ref 5–12)
NEUTROPHILS NFR BLD AUTO: 7.76 10*3/MM3 (ref 1.7–7)
NEUTROPHILS NFR BLD AUTO: 79.6 % (ref 42.7–76)
NITRITE UR QL STRIP: NEGATIVE
NRBC BLD AUTO-RTO: 0 /100 WBC (ref 0–0.2)
NT-PROBNP SERPL-MCNC: 5769 PG/ML (ref 0–900)
PH UR STRIP.AUTO: <=5 [PH] (ref 5–8)
PLATELET # BLD AUTO: 201 10*3/MM3 (ref 140–450)
PMV BLD AUTO: 10.6 FL (ref 6–12)
POTASSIUM SERPL-SCNC: 5.8 MMOL/L (ref 3.5–5.2)
PROT SERPL-MCNC: 6.2 G/DL (ref 6–8.5)
PROT UR QL STRIP: ABNORMAL
QT INTERVAL: 396 MS
QTC INTERVAL: 373 MS
RBC # BLD AUTO: 3.31 10*6/MM3 (ref 3.77–5.28)
RBC # UR STRIP: ABNORMAL /HPF
REF LAB TEST METHOD: ABNORMAL
RENAL EPI CELLS #/AREA URNS HPF: ABNORMAL /HPF
SODIUM SERPL-SCNC: 134 MMOL/L (ref 136–145)
SP GR UR STRIP: 1.02 (ref 1–1.03)
SQUAMOUS #/AREA URNS HPF: ABNORMAL /HPF
TRANS CELLS #/AREA URNS HPF: ABNORMAL /HPF
TROPONIN T SERPL HS-MCNC: 97 NG/L
UROBILINOGEN UR QL STRIP: ABNORMAL
WBC # UR STRIP: ABNORMAL /HPF
WBC NRBC COR # BLD AUTO: 9.76 10*3/MM3 (ref 3.4–10.8)
WHOLE BLOOD HOLD COAG: NORMAL
WHOLE BLOOD HOLD SPECIMEN: NORMAL

## 2025-04-17 PROCEDURE — 36415 COLL VENOUS BLD VENIPUNCTURE: CPT

## 2025-04-17 PROCEDURE — 71045 X-RAY EXAM CHEST 1 VIEW: CPT

## 2025-04-17 PROCEDURE — 83735 ASSAY OF MAGNESIUM: CPT

## 2025-04-17 PROCEDURE — 87186 SC STD MICRODIL/AGAR DIL: CPT | Performed by: EMERGENCY MEDICINE

## 2025-04-17 PROCEDURE — 99291 CRITICAL CARE FIRST HOUR: CPT

## 2025-04-17 PROCEDURE — 85025 COMPLETE CBC W/AUTO DIFF WBC: CPT

## 2025-04-17 PROCEDURE — 81001 URINALYSIS AUTO W/SCOPE: CPT | Performed by: EMERGENCY MEDICINE

## 2025-04-17 PROCEDURE — 83880 ASSAY OF NATRIURETIC PEPTIDE: CPT | Performed by: EMERGENCY MEDICINE

## 2025-04-17 PROCEDURE — 93005 ELECTROCARDIOGRAM TRACING: CPT | Performed by: EMERGENCY MEDICINE

## 2025-04-17 PROCEDURE — 87077 CULTURE AEROBIC IDENTIFY: CPT | Performed by: EMERGENCY MEDICINE

## 2025-04-17 PROCEDURE — 84484 ASSAY OF TROPONIN QUANT: CPT | Performed by: EMERGENCY MEDICINE

## 2025-04-17 PROCEDURE — 80053 COMPREHEN METABOLIC PANEL: CPT

## 2025-04-17 PROCEDURE — 93005 ELECTROCARDIOGRAM TRACING: CPT

## 2025-04-17 PROCEDURE — 84484 ASSAY OF TROPONIN QUANT: CPT

## 2025-04-17 PROCEDURE — 87086 URINE CULTURE/COLONY COUNT: CPT | Performed by: EMERGENCY MEDICINE

## 2025-04-17 RX ORDER — SODIUM CHLORIDE 0.9 % (FLUSH) 0.9 %
10 SYRINGE (ML) INJECTION AS NEEDED
Status: DISCONTINUED | OUTPATIENT
Start: 2025-04-17 | End: 2025-04-25 | Stop reason: HOSPADM

## 2025-04-17 RX ORDER — HYDROCODONE BITARTRATE AND ACETAMINOPHEN 5; 325 MG/1; MG/1
1 TABLET ORAL ONCE
Refills: 0 | Status: COMPLETED | OUTPATIENT
Start: 2025-04-18 | End: 2025-04-18

## 2025-04-18 PROBLEM — J96.21 ACUTE ON CHRONIC RESPIRATORY FAILURE WITH HYPOXIA: Status: ACTIVE | Noted: 2025-04-18

## 2025-04-18 PROBLEM — N18.9 ACUTE KIDNEY INJURY SUPERIMPOSED ON CHRONIC KIDNEY DISEASE: Status: ACTIVE | Noted: 2025-04-18

## 2025-04-18 PROBLEM — N39.0 ACUTE UTI: Status: ACTIVE | Noted: 2025-04-18

## 2025-04-18 PROBLEM — E11.21 DIABETIC NEPHROPATHY: Status: ACTIVE | Noted: 2025-04-18

## 2025-04-18 PROBLEM — R91.8 RIGHT LOWER LOBE PULMONARY INFILTRATE: Status: ACTIVE | Noted: 2025-04-18

## 2025-04-18 PROBLEM — I50.33 ACUTE ON CHRONIC DIASTOLIC (CONGESTIVE) HEART FAILURE: Status: ACTIVE | Noted: 2025-04-18

## 2025-04-18 PROBLEM — N18.9 HISTORY OF ANEMIA DUE TO CHRONIC KIDNEY DISEASE: Status: ACTIVE | Noted: 2025-04-18

## 2025-04-18 PROBLEM — Z86.2 HISTORY OF ANEMIA DUE TO CHRONIC KIDNEY DISEASE: Status: ACTIVE | Noted: 2025-04-18

## 2025-04-18 PROBLEM — E87.20 METABOLIC ACIDOSIS: Status: ACTIVE | Noted: 2025-04-18

## 2025-04-18 PROBLEM — N17.9 ACUTE RENAL FAILURE: Status: ACTIVE | Noted: 2025-04-18

## 2025-04-18 PROBLEM — E87.5 HYPERKALEMIA: Status: ACTIVE | Noted: 2025-04-18

## 2025-04-18 LAB
ANION GAP SERPL CALCULATED.3IONS-SCNC: 17.7 MMOL/L (ref 5–15)
BUN SERPL-MCNC: 92 MG/DL (ref 6–20)
BUN/CREAT SERPL: 16.5 (ref 7–25)
CALCIUM SPEC-SCNC: 8.2 MG/DL (ref 8.6–10.5)
CHLORIDE SERPL-SCNC: 100 MMOL/L (ref 98–107)
CO2 SERPL-SCNC: 13.3 MMOL/L (ref 22–29)
CREAT SERPL-MCNC: 5.57 MG/DL (ref 0.57–1)
D-LACTATE SERPL-SCNC: 0.6 MMOL/L (ref 0.5–2)
EGFRCR SERPLBLD CKD-EPI 2021: 8.3 ML/MIN/1.73
GEN 5 1HR TROPONIN T REFLEX: 97 NG/L
GLUCOSE BLDC GLUCOMTR-MCNC: 114 MG/DL (ref 70–99)
GLUCOSE BLDC GLUCOMTR-MCNC: 136 MG/DL (ref 70–99)
GLUCOSE BLDC GLUCOMTR-MCNC: 171 MG/DL (ref 70–99)
GLUCOSE BLDC GLUCOMTR-MCNC: 190 MG/DL (ref 70–99)
GLUCOSE BLDC GLUCOMTR-MCNC: 231 MG/DL (ref 70–99)
GLUCOSE SERPL-MCNC: 168 MG/DL (ref 65–99)
IRON 24H UR-MRATE: 26 MCG/DL (ref 37–145)
IRON SATN MFR SERPL: 9 % (ref 20–50)
POTASSIUM SERPL-SCNC: 6.2 MMOL/L (ref 3.5–5.2)
SODIUM SERPL-SCNC: 131 MMOL/L (ref 136–145)
TIBC SERPL-MCNC: 299 MCG/DL (ref 298–536)
TRANSFERRIN SERPL-MCNC: 201 MG/DL (ref 200–360)
TROPONIN T % DELTA: 0
TROPONIN T NUMERIC DELTA: 0 NG/L

## 2025-04-18 PROCEDURE — 63710000001 INSULIN REGULAR HUMAN PER 5 UNITS: Performed by: INTERNAL MEDICINE

## 2025-04-18 PROCEDURE — 80048 BASIC METABOLIC PNL TOTAL CA: CPT | Performed by: FAMILY MEDICINE

## 2025-04-18 PROCEDURE — 94664 DEMO&/EVAL PT USE INHALER: CPT

## 2025-04-18 PROCEDURE — 25010000002 CALCIUM GLUCONATE 2-0.675 GM/100ML-% SOLUTION: Performed by: INTERNAL MEDICINE

## 2025-04-18 PROCEDURE — 25810000003 LACTATED RINGERS PER 1000 ML: Performed by: FAMILY MEDICINE

## 2025-04-18 PROCEDURE — 99223 1ST HOSP IP/OBS HIGH 75: CPT | Performed by: FAMILY MEDICINE

## 2025-04-18 PROCEDURE — 94640 AIRWAY INHALATION TREATMENT: CPT

## 2025-04-18 PROCEDURE — 83540 ASSAY OF IRON: CPT | Performed by: INTERNAL MEDICINE

## 2025-04-18 PROCEDURE — 82948 REAGENT STRIP/BLOOD GLUCOSE: CPT

## 2025-04-18 PROCEDURE — 25810000003 SODIUM CHLORIDE 0.9 % SOLUTION: Performed by: EMERGENCY MEDICINE

## 2025-04-18 PROCEDURE — 25010000002 CEFEPIME PER 500 MG: Performed by: EMERGENCY MEDICINE

## 2025-04-18 PROCEDURE — 82948 REAGENT STRIP/BLOOD GLUCOSE: CPT | Performed by: FAMILY MEDICINE

## 2025-04-18 PROCEDURE — 94799 UNLISTED PULMONARY SVC/PX: CPT

## 2025-04-18 PROCEDURE — 36415 COLL VENOUS BLD VENIPUNCTURE: CPT | Performed by: FAMILY MEDICINE

## 2025-04-18 PROCEDURE — 84466 ASSAY OF TRANSFERRIN: CPT | Performed by: INTERNAL MEDICINE

## 2025-04-18 PROCEDURE — 83605 ASSAY OF LACTIC ACID: CPT | Performed by: FAMILY MEDICINE

## 2025-04-18 PROCEDURE — 63710000001 INSULIN GLARGINE PER 5 UNITS: Performed by: FAMILY MEDICINE

## 2025-04-18 PROCEDURE — 97166 OT EVAL MOD COMPLEX 45 MIN: CPT

## 2025-04-18 PROCEDURE — 25010000002 FUROSEMIDE PER 20 MG: Performed by: INTERNAL MEDICINE

## 2025-04-18 PROCEDURE — 63710000001 INSULIN LISPRO (HUMAN) PER 5 UNITS: Performed by: FAMILY MEDICINE

## 2025-04-18 PROCEDURE — 25010000002 HEPARIN (PORCINE) PER 1000 UNITS: Performed by: FAMILY MEDICINE

## 2025-04-18 RX ORDER — ONDANSETRON 2 MG/ML
4 INJECTION INTRAMUSCULAR; INTRAVENOUS EVERY 6 HOURS PRN
Status: DISCONTINUED | OUTPATIENT
Start: 2025-04-18 | End: 2025-04-25 | Stop reason: HOSPADM

## 2025-04-18 RX ORDER — SODIUM CHLORIDE, SODIUM LACTATE, POTASSIUM CHLORIDE, CALCIUM CHLORIDE 600; 310; 30; 20 MG/100ML; MG/100ML; MG/100ML; MG/100ML
100 INJECTION, SOLUTION INTRAVENOUS CONTINUOUS
Status: DISCONTINUED | OUTPATIENT
Start: 2025-04-18 | End: 2025-04-18

## 2025-04-18 RX ORDER — GUAIFENESIN 600 MG/1
600 TABLET, EXTENDED RELEASE ORAL EVERY 12 HOURS SCHEDULED
Status: DISCONTINUED | OUTPATIENT
Start: 2025-04-18 | End: 2025-04-25 | Stop reason: HOSPADM

## 2025-04-18 RX ORDER — SODIUM BICARBONATE 650 MG/1
1300 TABLET ORAL 3 TIMES DAILY
Status: DISCONTINUED | OUTPATIENT
Start: 2025-04-18 | End: 2025-04-24

## 2025-04-18 RX ORDER — METOLAZONE 5 MG/1
10 TABLET ORAL DAILY
Status: DISCONTINUED | OUTPATIENT
Start: 2025-04-18 | End: 2025-04-20

## 2025-04-18 RX ORDER — SODIUM CHLORIDE 9 MG/ML
40 INJECTION, SOLUTION INTRAVENOUS AS NEEDED
Status: DISCONTINUED | OUTPATIENT
Start: 2025-04-18 | End: 2025-04-25 | Stop reason: HOSPADM

## 2025-04-18 RX ORDER — LEVOFLOXACIN 750 MG/1
750 TABLET, FILM COATED ORAL EVERY OTHER DAY
COMMUNITY
Start: 2025-04-16 | End: 2025-04-25 | Stop reason: HOSPADM

## 2025-04-18 RX ORDER — GUAIFENESIN/DEXTROMETHORPHAN 100-10MG/5
10 SYRUP ORAL EVERY 6 HOURS PRN
Status: DISCONTINUED | OUTPATIENT
Start: 2025-04-18 | End: 2025-04-25 | Stop reason: HOSPADM

## 2025-04-18 RX ORDER — SODIUM CHLORIDE, SODIUM LACTATE, POTASSIUM CHLORIDE, CALCIUM CHLORIDE 600; 310; 30; 20 MG/100ML; MG/100ML; MG/100ML; MG/100ML
100 INJECTION, SOLUTION INTRAVENOUS CONTINUOUS
Status: ACTIVE | OUTPATIENT
Start: 2025-04-18 | End: 2025-04-18

## 2025-04-18 RX ORDER — INDOMETHACIN 25 MG/1
50 CAPSULE ORAL EVERY 4 HOURS
Status: COMPLETED | OUTPATIENT
Start: 2025-04-18 | End: 2025-04-18

## 2025-04-18 RX ORDER — DEXTROSE MONOHYDRATE 25 G/50ML
50 INJECTION, SOLUTION INTRAVENOUS
Status: DISCONTINUED | OUTPATIENT
Start: 2025-04-18 | End: 2025-04-25 | Stop reason: HOSPADM

## 2025-04-18 RX ORDER — POLYETHYLENE GLYCOL 3350 17 G/17G
17 POWDER, FOR SOLUTION ORAL DAILY PRN
Status: DISCONTINUED | OUTPATIENT
Start: 2025-04-18 | End: 2025-04-25 | Stop reason: HOSPADM

## 2025-04-18 RX ORDER — SODIUM CHLORIDE 0.9 % (FLUSH) 0.9 %
10 SYRINGE (ML) INJECTION EVERY 12 HOURS SCHEDULED
Status: DISCONTINUED | OUTPATIENT
Start: 2025-04-18 | End: 2025-04-25 | Stop reason: HOSPADM

## 2025-04-18 RX ORDER — IBUPROFEN 600 MG/1
1 TABLET ORAL
Status: DISCONTINUED | OUTPATIENT
Start: 2025-04-18 | End: 2025-04-25 | Stop reason: HOSPADM

## 2025-04-18 RX ORDER — NICOTINE POLACRILEX 4 MG
15 LOZENGE BUCCAL
Status: DISCONTINUED | OUTPATIENT
Start: 2025-04-18 | End: 2025-04-25 | Stop reason: HOSPADM

## 2025-04-18 RX ORDER — SODIUM CHLORIDE 0.9 % (FLUSH) 0.9 %
10 SYRINGE (ML) INJECTION AS NEEDED
Status: DISCONTINUED | OUTPATIENT
Start: 2025-04-18 | End: 2025-04-25 | Stop reason: HOSPADM

## 2025-04-18 RX ORDER — IPRATROPIUM BROMIDE AND ALBUTEROL SULFATE 2.5; .5 MG/3ML; MG/3ML
3 SOLUTION RESPIRATORY (INHALATION) EVERY 4 HOURS PRN
Status: DISCONTINUED | OUTPATIENT
Start: 2025-04-18 | End: 2025-04-25 | Stop reason: HOSPADM

## 2025-04-18 RX ORDER — IPRATROPIUM BROMIDE AND ALBUTEROL SULFATE 2.5; .5 MG/3ML; MG/3ML
3 SOLUTION RESPIRATORY (INHALATION)
Status: DISCONTINUED | OUTPATIENT
Start: 2025-04-18 | End: 2025-04-25 | Stop reason: HOSPADM

## 2025-04-18 RX ORDER — CALCIUM GLUCONATE 20 MG/ML
2000 INJECTION, SOLUTION INTRAVENOUS ONCE
Status: COMPLETED | OUTPATIENT
Start: 2025-04-18 | End: 2025-04-18

## 2025-04-18 RX ORDER — SODIUM ZIRCONIUM CYCLOSILICATE 10 G/10G
10 POWDER, FOR SUSPENSION ORAL 3 TIMES WEEKLY
COMMUNITY

## 2025-04-18 RX ORDER — BISACODYL 5 MG/1
5 TABLET, DELAYED RELEASE ORAL DAILY PRN
Status: DISCONTINUED | OUTPATIENT
Start: 2025-04-18 | End: 2025-04-25 | Stop reason: HOSPADM

## 2025-04-18 RX ORDER — FUROSEMIDE 10 MG/ML
80 INJECTION INTRAMUSCULAR; INTRAVENOUS EVERY 6 HOURS
Status: DISCONTINUED | OUTPATIENT
Start: 2025-04-18 | End: 2025-04-19

## 2025-04-18 RX ORDER — DEXTROSE MONOHYDRATE 25 G/50ML
25 INJECTION, SOLUTION INTRAVENOUS
Status: DISCONTINUED | OUTPATIENT
Start: 2025-04-18 | End: 2025-04-25 | Stop reason: HOSPADM

## 2025-04-18 RX ORDER — HEPARIN SODIUM 5000 [USP'U]/ML
5000 INJECTION, SOLUTION INTRAVENOUS; SUBCUTANEOUS EVERY 12 HOURS SCHEDULED
Status: DISCONTINUED | OUTPATIENT
Start: 2025-04-18 | End: 2025-04-25 | Stop reason: HOSPADM

## 2025-04-18 RX ORDER — NYSTATIN 100000 [USP'U]/G
1 POWDER TOPICAL 2 TIMES DAILY
COMMUNITY

## 2025-04-18 RX ORDER — CYANOCOBALAMIN 1000 UG/ML
1000 INJECTION, SOLUTION INTRAMUSCULAR; SUBCUTANEOUS
COMMUNITY
Start: 2024-11-13

## 2025-04-18 RX ORDER — BISACODYL 10 MG
10 SUPPOSITORY, RECTAL RECTAL DAILY PRN
Status: DISCONTINUED | OUTPATIENT
Start: 2025-04-18 | End: 2025-04-25 | Stop reason: HOSPADM

## 2025-04-18 RX ORDER — INSULIN LISPRO 100 [IU]/ML
2-9 INJECTION, SOLUTION INTRAVENOUS; SUBCUTANEOUS
Status: DISCONTINUED | OUTPATIENT
Start: 2025-04-18 | End: 2025-04-25 | Stop reason: HOSPADM

## 2025-04-18 RX ORDER — INSULIN ASPART 100 [IU]/ML
INJECTION, SOLUTION INTRAVENOUS; SUBCUTANEOUS
COMMUNITY

## 2025-04-18 RX ORDER — ECHINACEA PURPUREA EXTRACT 125 MG
1 TABLET ORAL
COMMUNITY

## 2025-04-18 RX ORDER — AMOXICILLIN 250 MG
2 CAPSULE ORAL 2 TIMES DAILY PRN
Status: DISCONTINUED | OUTPATIENT
Start: 2025-04-18 | End: 2025-04-25 | Stop reason: HOSPADM

## 2025-04-18 RX ADMIN — Medication 10 ML: at 08:17

## 2025-04-18 RX ADMIN — SODIUM BICARBONATE 1300 MG: 650 TABLET ORAL at 21:06

## 2025-04-18 RX ADMIN — GUAIFENESIN 600 MG: 600 TABLET ORAL at 21:06

## 2025-04-18 RX ADMIN — INSULIN HUMAN 10 UNITS: 100 INJECTION, SOLUTION PARENTERAL at 08:16

## 2025-04-18 RX ADMIN — FUROSEMIDE 80 MG: 10 INJECTION, SOLUTION INTRAMUSCULAR; INTRAVENOUS at 21:05

## 2025-04-18 RX ADMIN — INSULIN GLARGINE 15 UNITS: 100 INJECTION, SOLUTION SUBCUTANEOUS at 02:21

## 2025-04-18 RX ADMIN — Medication 10 ML: at 21:07

## 2025-04-18 RX ADMIN — INSULIN GLARGINE 15 UNITS: 100 INJECTION, SOLUTION SUBCUTANEOUS at 21:43

## 2025-04-18 RX ADMIN — INSULIN LISPRO 2 UNITS: 100 INJECTION, SOLUTION INTRAVENOUS; SUBCUTANEOUS at 17:12

## 2025-04-18 RX ADMIN — FUROSEMIDE 80 MG: 10 INJECTION, SOLUTION INTRAMUSCULAR; INTRAVENOUS at 15:30

## 2025-04-18 RX ADMIN — SODIUM ZIRCONIUM CYCLOSILICATE 10 G: 10 POWDER, FOR SUSPENSION ORAL at 08:17

## 2025-04-18 RX ADMIN — HYDROCODONE BITARTRATE AND ACETAMINOPHEN 1 TABLET: 5; 325 TABLET ORAL at 00:07

## 2025-04-18 RX ADMIN — FUROSEMIDE 80 MG: 10 INJECTION, SOLUTION INTRAMUSCULAR; INTRAVENOUS at 11:26

## 2025-04-18 RX ADMIN — SODIUM BICARBONATE 1300 MG: 650 TABLET ORAL at 15:30

## 2025-04-18 RX ADMIN — IPRATROPIUM BROMIDE AND ALBUTEROL SULFATE 3 ML: .5; 3 SOLUTION RESPIRATORY (INHALATION) at 10:19

## 2025-04-18 RX ADMIN — SODIUM BICARBONATE 50 MEQ: 84 INJECTION INTRAVENOUS at 17:12

## 2025-04-18 RX ADMIN — SODIUM BICARBONATE 50 MEQ: 84 INJECTION INTRAVENOUS at 11:27

## 2025-04-18 RX ADMIN — SODIUM BICARBONATE 1300 MG: 650 TABLET ORAL at 11:27

## 2025-04-18 RX ADMIN — SODIUM CHLORIDE, POTASSIUM CHLORIDE, SODIUM LACTATE AND CALCIUM CHLORIDE 100 ML/HR: 600; 310; 30; 20 INJECTION, SOLUTION INTRAVENOUS at 01:49

## 2025-04-18 RX ADMIN — INSULIN LISPRO 4 UNITS: 100 INJECTION, SOLUTION INTRAVENOUS; SUBCUTANEOUS at 08:16

## 2025-04-18 RX ADMIN — GUAIFENESIN 600 MG: 600 TABLET ORAL at 08:17

## 2025-04-18 RX ADMIN — SODIUM BICARBONATE 50 MEQ: 84 INJECTION INTRAVENOUS at 14:19

## 2025-04-18 RX ADMIN — SODIUM CHLORIDE 1000 ML: 9 INJECTION, SOLUTION INTRAVENOUS at 00:08

## 2025-04-18 RX ADMIN — HEPARIN SODIUM 5000 UNITS: 5000 INJECTION INTRAVENOUS; SUBCUTANEOUS at 08:17

## 2025-04-18 RX ADMIN — CALCIUM GLUCONATE 2000 MG: 20 INJECTION, SOLUTION INTRAVENOUS at 08:17

## 2025-04-18 RX ADMIN — Medication 10 ML: at 01:49

## 2025-04-18 RX ADMIN — MICONAZOLE NITRATE 1 APPLICATION: 2 POWDER TOPICAL at 21:06

## 2025-04-18 RX ADMIN — CEFEPIME 2000 MG: 2 INJECTION, POWDER, FOR SOLUTION INTRAVENOUS at 00:08

## 2025-04-18 RX ADMIN — MICONAZOLE NITRATE 1 APPLICATION: 2 POWDER TOPICAL at 11:27

## 2025-04-18 RX ADMIN — HEPARIN SODIUM 5000 UNITS: 5000 INJECTION INTRAVENOUS; SUBCUTANEOUS at 21:06

## 2025-04-18 RX ADMIN — METOLAZONE 10 MG: 5 TABLET ORAL at 11:31

## 2025-04-18 RX ADMIN — SODIUM BICARBONATE 50 MEQ: 84 INJECTION INTRAVENOUS at 21:05

## 2025-04-18 NOTE — CASE MANAGEMENT/SOCIAL WORK
Discharge Planning Assessment  DUSTY Arnett     Patient Name: Desi Krishnan  MRN: [id number]  Today's Date: 4/18/2025    Admit Date: 4/17/2025    Plan: Pt is a LTC resident at [facility]. Pt states she has been there for 10 years, SW minda confirm bed hold at facility. Pt does have wheelchair at facility and usually able to dress self. SW will continue to follow for needs.   Discharge Needs Assessment       Row Name 04/18/25 1133       Living Environment    People in Home facility resident    Unique Family Situation Pt has resided at [facility] for 10 years.    Current Living Arrangements extended care facility    Potentially Unsafe Housing Conditions none    In the past 12 months has the electric, gas, oil, or water company threatened to shut off services in your home? No    Primary Care Provided by self    Provides Primary Care For no one;no one, unable/limited ability to care for self    Family Caregiver if Needed child(rajesh), adult    Quality of Family Relationships involved;supportive    Able to Return to Prior Arrangements yes       Resource/Environmental Concerns    Resource/Environmental Concerns none    Transportation Concerns none       Transportation Needs    In the past 12 months, has lack of transportation kept you from medical appointments or from getting medications? no    In the past 12 months, has lack of transportation kept you from meetings, work, or from getting things needed for daily living? No       Food Insecurity    Within the past 12 months, you worried that your food would run out before you got the money to buy more. Never true    Within the past 12 months, the food you bought just didn't last and you didn't have money to get more. Never true       Transition Planning    Patient/Family Anticipates Transition to long-term care facility    Patient/Family Anticipated Services at Transition none    Transportation Anticipated health plan transportation       Discharge Needs Assessment     Readmission Within the Last 30 Days no previous admission in last 30 days    Equipment Currently Used at Home wheelchair    Concerns to be Addressed discharge planning    Do you want help finding or keeping work or a job? I do not need or want help    Do you want help with school or training? For example, starting or completing job training or getting a high school diploma, GED or equivalent No    Anticipated Changes Related to Illness none    Equipment Needed After Discharge none    Discharge Coordination/Progress Pt is a LTC resident at [facility]. Pt states she has been there for 10 years, MICHAEL perla confirm bed hold at facility. Pt does have wheelchair at facility and usually able to dress self. SW will continue to follow for needs.                   Discharge Plan       Row Name 04/18/25 1143       Plan    Plan Pt is a LTC resident at [facility]. Pt states she has been there for 10 years, MICHAEL perla confirm bed hold at facility. Pt does have wheelchair at facility and usually able to dress self. SW will continue to follow for needs.                       Demographic Summary       Row Name 04/18/25 1130       General Information    Admission Type inpatient    Arrived From emergency department    Referral Source admission list    Reason for Consult discharge planning    Preferred Language English       Contact Information    Permission Granted to Share Info With permission denied                   Functional Status       Row Name 04/18/25 1130       Functional Status    Usual Activity Tolerance poor    Current Activity Tolerance poor       Physical Activity    On average, how many days per week do you engage in moderate to strenuous exercise (like a brisk walk)? 0 days    On average, how many minutes do you engage in exercise at this level? 0 min    Number of minutes of exercise per week 0       Assessment of Health Literacy    How often do you have someone help you read hospital materials? Occasionally     How often do you have problems learning about your medical condition because of difficulty understanding written information? Occasionally    How often do you have a problem understanding what is told to you about your medical condition? Occasionally    How confident are you filling out medical forms by yourself? Quite a bit    Health Literacy Good       Functional Status, IADL    Medications assistive person    Meal Preparation assistive person    Housekeeping assistive person    Laundry assistive person    Shopping assistive person    If for any reason you need help with day-to-day activities such as bathing, preparing meals, shopping, managing finances, etc., do you get the help you need? I don't need any help       Mental Status    General Appearance WDL WDL       Mental Status Summary    Recent Changes in Mental Status/Cognitive Functioning no changes       Employment/    Employment Status disabled                   Psychosocial    No documentation.                  Abuse/Neglect    No documentation.                  Legal       Row Name 04/18/25 1133       Financial Resource Strain    How hard is it for you to pay for the very basics like food, housing, medical care, and heating? Not hard       Financial/Legal    Source of Income social security    Financial/Environmental Concerns none    Application for Public Assistance not applied       Legal    Criminal Activity/Legal Involvement none                   Substance Abuse    No documentation.                  Patient Forms    No documentation.                     Maria Esther Matute

## 2025-04-18 NOTE — H&P
[facility]   HISTORY AND PHYSICAL    Patient Name: Desi Krishnan  : 1965  MRN: [id number]  Primary Care Physician:  Alba Guzman MD  Date of admission: 2025    Subjective   Subjective     Chief Complaint: Generalized weakness, shortness of breath    HPI:    Desi Krishnan is a 60 y.o. female with past medical history of diabetes, hypertension, debility, CKD4, COPD, morbid obesity, and GERD presents to the ED from nursing home for evaluation of generalized weakness, shortness of breath and hypoxia.  Patient states that last week she began to not feel well with shortness of breath, cough, body aches, decreased appetite, and weakness.  Staff at nursing home noticed that she was hypoxic and was placed on oxygen.  Patient continued to decline with increased weakness, chills, dysuria, and lightheadedness.  Due to concerns patient was brought to the ED for further evaluation.  In the ED patient's vitals were within normal limits on 2 L nasal cannula.  Labs show that she had significantly reduced renal function from baseline, hyperkalemia, uremia, hyperglycemia, and elevated proBNP level, and elevated but flat troponin and UA positive for UTI.  Chest x-ray showed cardiomegaly with infiltrate at the right lung base.  When seen patient was resting comfortably and stated that she was feeling somewhat better with treatment and when asked she denied any recent fevers, chills, headaches, focal weakness, chest pain, palpitation, shortness of breath, cough, abdominal pain, nausea, vomiting, diarrhea, constipation, dysuria, hematuria, hematochezia, melena, or anxiety.  Patient admitted for further evaluation and treatment.      Review of Systems   All systems were reviewed and negative except for: As per HPI    Personal History     Past Medical History:   Diagnosis Date    Diabetes     Hypertension        Past Surgical History:   Procedure Laterality Date    KNEE SURGERY      TUBAL ABDOMINAL LIGATION          Family History: family history includes Heart disease in her father. Otherwise pertinent FHx was reviewed and not pertinent to current issue.    Social History:  reports that she quit smoking about 11 years ago. Her smoking use included cigarettes. She started smoking about 27 years ago. She has a 7.7 pack-year smoking history. She has never used smokeless tobacco. Drug use questions deferred to the physician. She reports that she does not drink alcohol.    Home Medications:  BASAGLAR KWIKPEN, Canagliflozin, Glucagon Emergency, HYDROcodone-acetaminophen, Insulin Aspart FlexPen, Insulin Lispro, Semaglutide, Semaglutide (1 MG/DOSE), Semaglutide(0.25 or 0.5MG/DOS), Vitamin D3, acetaminophen, amLODIPine, atorvastatin, buprenorphine-naloxone, cyclobenzaprine, dapagliflozin Propanediol, erythromycin, fluticasone, furosemide, gabapentin, glucagon, levothyroxine, loratadine, losartan, magnesium hydroxide, metFORMIN, sore muscle, tuberculin, and vitamin D      Allergies:  Allergies   Allergen Reactions    Hydrochlorothiazide     Penicillin G Hives    Penicillins     Vancomycin        Objective   Objective     Vitals:   Temp:  [98.3 °F (36.8 °C)] 98.3 °F (36.8 °C)  Heart Rate:  [53-55] 53  Resp:  [18-20] 20  BP: (116-157)/(47-68) 116/68  Flow (L/min) (Oxygen Therapy):  [2] 2  Physical Exam    Constitutional: Awake, alert, ill-appearing   Eyes: PERRLA, sclerae anicteric, no conjunctival injection   HENT: NCAT, mucous membranes moist   Neck: Supple, no thyromegaly, no lymphadenopathy, trachea midline   Respiratory: Decreased breath sounds bilaterally, rhonchi, normal nonlabored respirations    Cardiovascular: RRR, no murmurs, rubs, or gallops, palpable pedal pulses bilaterally   Gastrointestinal: Positive bowel sounds, soft, nontender, nondistended   Musculoskeletal: Bilateral lower extremity edema, no clubbing or cyanosis to extremities   Psychiatric: Appropriate affect, cooperative   Neurologic: Oriented x 3, strength  symmetric in all extremities, Cranial Nerves grossly intact to confrontation, speech clear   Skin: No rashes     Result Review    Result Review:  I have personally reviewed the results from the time of this admission to 4/18/2025 03:06 EDT and agree with these findings:  [x]  Laboratory list / accordion  []  Microbiology  [x]  Radiology  [x]  EKG/Telemetry   []  Cardiology/Vascular   []  Pathology  []  Old records  []  Other:  Most notable findings include: Labs show that she had significantly reduced renal function from baseline, hyperkalemia, uremia, hyperglycemia, and elevated proBNP level, and elevated but flat troponin and UA positive for UTI.  Chest x-ray showed cardiomegaly with infiltrate at the right lung base.       Assessment & Plan   Assessment / Plan     Brief Patient Summary:  Desi Krishnan is a 60 y.o. female with past medical history of diabetes, hypertension, debility, CKD4, COPD, morbid obesity, and GERD presents to the ED from nursing home for evaluation of generalized weakness, shortness of breath and hypoxia.    Active Hospital Problems:  Active Hospital Problems    Diagnosis     **Acute on chronic respiratory failure with hypoxia     Acute renal failure     Right lower lobe pulmonary infiltrate     Acute UTI     LVH (left ventricular hypertrophy)     CHF (congestive heart failure), NYHA class II, unspecified failure chronicity, diastolic     SOB (shortness of breath)     Obesity, Class III, BMI 40-49.9 (morbid obesity)      Plan:     Acute on chronic respiratory failure with hypoxia  - Admit to telemetry  - Secondary to pneumonia with superimposed COPD  -Imaging reviewed  -Supplemental oxygen as needed, wean down as tolerated  -Duo nebs as needed  -Empiric antibiotics  -Mucinex Tessalon Perles  -Incentive spirometry  -Consult pulmonology if warranted  -Supportive care    UTI  -UA reviewed  -Empiric antibiotics  -Urine, blood cultures  -IVF  -Supportive care    FUAD on CKD 4  - Renal function  below baseline  - Likely secondary to above  - Gentle hydration  - Avoid nephrotoxic drugs  - Will consult nephrology  - Supportive care    Generalized weakness  - Secondary to above  - Fall precautions  - PT    Diabetes  -Insulin sliding scale  -Levemir at bedtime  -Titrate as needed    Debility  Morbid obesity    GI ppx  DVT ppx      VTE Prophylaxis:  Pharmacologic VTE prophylaxis orders are present.        CODE STATUS:       Admission Status:  I believe this patient meets inpatient status.      Electronically signed by Marco Hill MD, 04/18/25, 3:06 AM EDT.

## 2025-04-18 NOTE — PROGRESS NOTES
[facility]   Hospitalist Progress Note  Date: 2025  Patient Name: Desi Krishnan  : 1965  MRN: [id number]  Date of admission: 2025      Subjective   Subjective     Chief Complaint: Generalized weakness and shortness of breath    Summary: Desi Krishnan is a 60-year-old female with a past medical history of diabetes, hypertension, debility, CKD 4, COPD, morbid obesity and GERD who presented to the emergency department from nursing home for evaluation of generalized weakness and shortness of breath and hypoxia.  Over the past week patient has been experiencing shortness of breath with associated cough, body aches and decreased appetite.  Labs in the emergency department revealed significantly reduced renal function from baseline, hyperkalemia, uremia, hyperglycemia and elevated proBNP level with elevated but flat troponin.  Urinalysis positive for UTI.  Chest x-ray revealed cardiomegaly with infiltrate in the right lung base.  Nephrology consulted.  Patient admitted to hospitalist service for further management.    Interval Followup: Seen and examined patient this morning.  Patient was very tearful.  Offered reassurance.  No complaints of pain.  Wound care consulted for multiple wounds found in folds.      Objective   Objective     Vitals:   Temp:  [98.2 °F (36.8 °C)-98.6 °F (37 °C)] 98.2 °F (36.8 °C)  Heart Rate:  [43-55] 48  Resp:  [18-20] 18  BP: (100-157)/(43-68) 100/57  Flow (L/min) (Oxygen Therapy):  [2-3] 3    Physical Exam   GEN: No acute distress  HEENT: Moist mucous membranes  LUNGS: Equal chest rise bilaterally.   CARDIAC: Regular rate and rhythm  NEURO: Moving all 4 extremities spontaneously  SKIN: Multiple wounds found in folds       Result Review    Result Review:  I have personally reviewed the results from the time of this admission to 2025 16:36 EDT and agree with these findings:  []  Laboratory  []  Microbiology  []  Radiology  []  EKG/Telemetry   []  Cardiology/Vascular    []  Pathology  []  Old records  []  Other:    Assessment & Plan   Assessment / Plan     Assessment:  Acute on chronic respiratory failure with hypoxia  Acute kidney injury superimposed on chronic kidney disease  Hyperkalemia  Metabolic acidosis  Right lower lobe pulmonary infiltrate  UTI  Left ventricular hypertrophy  CHF (congestive heart failure), NYHA class II, unspecified failure chronicity, diastolic   Obesity, class III, BMI 40-49.9 morbid obesity      Plan:  Continue to monitor in the hospital with workup and management of the above  Appreciate recommendations from nephrology  Imaging reviewed  Continue cefepime  Follow-up urine and blood cultures  Continue sodium bicarbonate  Continue aggressive diuresis with IV Lasix 80 mg every 6 hours  Avoid nephrotoxic agents  SSI  PT/OT  CBC, CMP reviewed  A.m. labs  Patient likely needing dialysis         Discussed plan with RN.    VTE Prophylaxis:  Pharmacologic VTE prophylaxis orders are present.        CODE STATUS:   Code Status (Patient has no pulse and is not breathing): CPR (Attempt to Resuscitate)  Medical Interventions (Patient has pulse or is breathing): Full Support        Electronically signed by Fadia Ball PA-C, 04/18/25, 4:36 PM EDT.

## 2025-04-18 NOTE — PLAN OF CARE
Problem: Adult Inpatient Plan of Care  Goal: Plan of Care Review  4/18/2025 0510 by Courtney Hills, RN  Outcome: Progressing  Flowsheets  Taken 4/18/2025 0510  Progress: no change  Plan of Care Reviewed With: patient  Taken 4/18/2025 0500  Outcome Evaluation: Patient alert and oriented. VSS. 2-3 LPM via NC. No s/s of distress. denies any pain at this time, pending consults for Nephrology, Wound care, PT, OT and Case management. Tolerating IVF. Hard stick for labs. Able to sleep in between care, call light within reach, care plan ongoing   Goal Outcome Evaluation:  Plan of Care Reviewed With: patient

## 2025-04-18 NOTE — THERAPY EVALUATION
Patient Name: Desi Krishnan  : 1965    MRN: [id number]                              Today's Date: 2025       Admit Date: 2025    Visit Dx:     ICD-10-CM ICD-9-CM   1. Acute renal failure, unspecified acute renal failure type  N17.9 584.9   2. Acute UTI  N39.0 599.0   3. Right lower lobe pulmonary infiltrate  R91.8 793.19     Patient Active Problem List   Diagnosis    Complete below knee amputation of right lower extremity    S/P bilateral below knee amputation    Obesity, Class III, BMI 40-49.9 (morbid obesity)    Nontraumatic tear of right rotator cuff    Primary osteoarthritis of right shoulder    CHF (congestive heart failure), NYHA class II, unspecified failure chronicity, diastolic    LVH (left ventricular hypertrophy)    SOB (shortness of breath)    Acute kidney injury superimposed on chronic kidney disease    Acute on chronic respiratory failure with hypoxia    Right lower lobe pulmonary infiltrate    Acute UTI    Hyperkalemia    Metabolic acidosis    Acute on chronic diastolic (congestive) heart failure    History of anemia due to chronic kidney disease    Diabetic nephropathy     Past Medical History:   Diagnosis Date    Diabetes     History of anemia due to chronic kidney disease 2025    Hypertension      Past Surgical History:   Procedure Laterality Date    KNEE SURGERY      TUBAL ABDOMINAL LIGATION        General Information       Row Name 25 1416          OT Time and Intention    Document Type evaluation  -SC     Mode of Treatment individual therapy;occupational therapy  -SC     Patient Effort adequate  -SC     Symptoms Noted During/After Treatment fatigue  -SC       Row Name 25 1416          General Information    Patient Profile Reviewed yes  -SC     Prior Level of Function --  Patient is a LTC resident at [facility] and self reports that she dressed herself, and showered w/ staff A utilizing a rolling shower chair. Pt reports at baseline she can sit at EOB  "and transfer self to w/c . No family to confirm prior level.  -SC     Barriers to Rehab medically complex  -SC       Row Name 04/18/25 1416          Occupational Profile    Reason for Services/Referral (Occupational Profile) Patient is a 60 year-old female admitted to [facility] on 4/17/2020.  OT consulted due to a decline in function and mobility.  No previous OT services for current condition.  -SC     Patient Goals (Occupational Profile) Patient wishes to return home to [facility] at prior level of function  -SC       Row Name 04/18/25 1416          Living Environment    Current Living Arrangements residential facility  -SC     People in Home facility resident  -SC       Row Name 04/18/25 1416          Home Main Entrance    Number of Stairs, Main Entrance none  -SC     Stair Railings, Main Entrance none  -SC       Row Name 04/18/25 1416          Stairs Within Home, Primary    Number of Stairs, Within Home, Primary none  -SC     Stair Railings, Within Home, Primary none  -SC       Row Name 04/18/25 1416          Cognition    Orientation Status (Cognition) oriented x 3  -SC       Row Name 04/18/25 1416          Safety Issues/Impairments Affecting Functional Mobility    Impairments Affecting Function (Mobility) strength;shortness of breath;endurance/activity tolerance  -SC               User Key  (r) = Recorded By, (t) = Taken By, (c) = Cosigned By      Initials Name Provider Type    SC Josefina Tyler OT Occupational Therapist                     Mobility/ADL's       Row Name 04/18/25 1419          Bed Mobility    Bed Mobility rolling left;rolling right;scooting/bridging;supine-sit  -SC     Rolling Left [place] (Bed Mobility) maximum assist (25% patient effort)  -SC     Rolling Right [place] (Bed Mobility) maximum assist (25% patient effort)  -SC     Comment, (Bed Mobility) Patient repeatedly verbalized, \"I can sit on EOB, I was doing it at [place] by myself.\". Clinician requested pt to roll to side for " "trial. Patient attempted x 3 and reported, \"I am too tired today.\".  -[facility] Name 04/18/25 1419          Activities of Daily Living    BADL Assessment/Intervention bathing;upper body dressing;lower body dressing;grooming;toileting;feeding  -SC       Row Name 04/18/25 1419          Bathing Assessment/Intervention    [place] Level (Bathing) maximum assist (25% patient effort)  -SC       Row Name 04/18/25 1419          Upper Body Dressing Assessment/Training    [place] Level (Upper Body Dressing) maximum assist (25% patient effort)  -SC       Row Name 04/18/25 1419          Lower Body Dressing Assessment/Training    [place] Level (Lower Body Dressing) dependent (less than 25% patient effort)  -SC       Row Name 04/18/25 1419          Grooming Assessment/Training    [place] Level (Grooming) standby assist;set up  -SC       Row Name 04/18/25 1419          Toileting Assessment/Training    [place] Level (Toileting) dependent (less than 25% patient effort)  -SC     Comment, (Toileting) catheter  -SC       Row Name 04/18/25 1419          Self-Feeding Assessment/Training    [place] Level (Feeding) set up  -SC               User Key  (r) = Recorded By, (t) = Taken By, (c) = Cosigned By      Initials Name Provider Type    SC Josefina Tyler OT Occupational Therapist                   Obj/Interventions       Row Name 04/18/25 1423          Sensory Assessment (Somatosensory)    Sensory Assessment (Somatosensory) sensation intact  -SC       Row Name 04/18/25 1423          Vision Assessment/Intervention    Visual Impairment/Limitations WFL  -[facility] 04/18/25 1423          Range of Motion Comprehensive    General Range of Motion bilateral upper extremity ROM WNL  -SC       Row Name 04/18/25 1423          Strength Comprehensive (MMT)    Comment, General Manual Muscle Testing (MMT) Assessment UB strenfth 3+/5  -SC       Row Name 04/18/25 1423          Motor Skills    Motor Skills " coordination;functional endurance  -SC     Coordination WFL  -SC     Functional Endurance poor+  -SC               User Key  (r) = Recorded By, (t) = Taken By, (c) = Cosigned By      Initials Name Provider Type    SC Josefina Tyler OT Occupational Therapist                   Goals/Plan       Row Name 04/18/25 1427          Bed Mobility Goal 1 (OT)    Activity/Assistive Device (Bed Mobility Goal 1, OT) bed mobility activities, all  -SC     [place] Level/Cues Needed (Bed Mobility Goal 1, OT) standby assist  -SC     Time Frame (Bed Mobility Goal 1, OT) long term goal (LTG);10 days  -SC       Row Name 04/18/25 1427          Transfer Goal 1 (OT)    Activity/Assistive Device (Transfer Goal 1, OT) transfers, all  -SC     [place] Level/Cues Needed (Transfer Goal 1, OT) standby assist  -SC     Time Frame (Transfer Goal 1, OT) long term goal (LTG);10 days  -SC       Row Name 04/18/25 1427          Bathing Goal 1 (OT)    Activity/Device (Bathing Goal 1, OT) bathing skills, all  -SC     [place] Level/Cues Needed (Bathing Goal 1, OT) minimum assist (75% or more patient effort)  -SC     Time Frame (Bathing Goal 1, OT) long term goal (LTG);10 days  -SC       Row Name 04/18/25 1427          Dressing Goal 1 (OT)    Activity/Device (Dressing Goal 1, OT) dressing skills, all  -SC     [place]/Cues Needed (Dressing Goal 1, OT) standby assist  -SC     Time Frame (Dressing Goal 1, OT) long term goal (LTG);10 days  -SC       Row Name 04/18/25 1427          Toileting Goal 1 (OT)    Activity/Device (Toileting Goal 1, OT) toileting skills, all  -SC     [place] Level/Cues Needed (Toileting Goal 1, OT) standby assist  -SC     Time Frame (Toileting Goal 1, OT) long term goal (LTG);10 days  -SC       Row Name 04/18/25 1427          Grooming Goal 1 (OT)    Activity/Device (Grooming Goal 1, OT) grooming skills, all  -SC     [place] (Grooming Goal 1, OT) modified independence  -SC     Time Frame (Grooming Goal 1, OT)  long term goal (LTG);10 days  -[facility] Name 04/18/25 1427          Strength Goal 1 (OT)    Strength Goal 1 (OT) Patient will improve upper body strength 5/5 to support improved ADL function and mobility.  -SC     Time Frame (Strength Goal 1, OT) long term goal (LTG);10 days  -[facility] Name 04/18/25 1427          Problem Specific Goal 1 (OT)    Problem Specific Goal 1 (OT) Patient will improve endurance to fair to support improved independence, function and safety in ADLs and during functional mobility.  -SC     Time Frame (Problem Specific Goal 1, OT) long term goal (LTG);10 days  -[facility] Name 04/18/25 1427          Therapy Assessment/Plan (OT)    Planned Therapy Interventions (OT) activity tolerance training;strengthening exercise;transfer/mobility retraining;BADL retraining;occupation/activity based interventions;functional balance retraining;patient/caregiver education/training  -SC               User Key  (r) = Recorded By, (t) = Taken By, (c) = Cosigned By      Initials Name Provider Type    Josefina Chong OT Occupational Therapist                   Clinical Impression       Row Name 04/18/25 1425          Pain Assessment    Pretreatment Pain Rating 0/10 - no pain  -SC     Posttreatment Pain Rating 0/10 - no pain  -SC       Row Name 04/18/25 1425          Plan of Care Review    Plan of Care Reviewed With patient  -SC     Progress no change  Evaluation  -SC     Outcome Evaluation Patient presents with limitations of strength, and activity tolerance which impede her ability to perform ADLs/transfers as prior.  The skills of a therapist will be required to safely and effectively implement treatment plan to restore maximum level function.  -[facility] Name 04/18/25 1426          Therapy Assessment/Plan (OT)    Rehab Potential (OT) fair  -SC     Criteria for Skilled Therapeutic Interventions Met (OT) yes;meets criteria;skilled treatment is necessary  -SC     Therapy Frequency (OT) 5 times/wk  -SC        Row Name 04/18/25 1425          Therapy Plan Review/Discharge Plan (OT)    Anticipated Discharge Disposition (OT) sub acute care setting  -SC       Row Name 04/18/25 1425          Positioning and Restraints    Pre-Treatment Position in bed  -SC     Post Treatment Position bed  -SC     In Bed call light within reach;encouraged to call for assist;exit alarm on  -SC               User Key  (r) = Recorded By, (t) = Taken By, (c) = Cosigned By      Initials Name Provider Type    SC Josefina Tyler OT Occupational Therapist                   Outcome Measures       Row Name 04/18/25 1428          How much help from another is currently needed...    Putting on and taking off regular lower body clothing? 1  -SC     Bathing (including washing, rinsing, and drying) 2  -SC     Toileting (which includes using toilet bed pan or urinal) 1  -SC     Putting on and taking off regular upper body clothing 2  -SC     Taking care of personal grooming (such as brushing teeth) 3  -SC     Eating meals 4  -SC     AM-PAC 6 Clicks Score (OT) 13  -SC       Row Name 04/18/25 0900 04/18/25 0511       How much help from another person do you currently need...    Turning from your back to your side while in flat bed without using bedrails? 2  -RB 2  -AP    Moving from lying on back to sitting on the side of a flat bed without bedrails? 2  -RB 2  -AP    Moving to and from a bed to a chair (including a wheelchair)? 1  -RB 1  -AP    Standing up from a chair using your arms (e.g., wheelchair, bedside chair)? 1  -RB 1  -AP    Climbing 3-5 steps with a railing? 1  -RB 1  -AP    To walk in hospital room? 1  -RB 1  -AP    AM-PAC 6 Clicks Score (PT) 8  -RB 8  -AP      Row Name 04/18/25 1428          Functional Assessment    Outcome Measure Options AM-PAC 6 Clicks Daily Activity (OT);Optimal Instrument  -SC       Row Name 04/18/25 1428          Optimal Instrument    Optimal Instrument Optimal - 3  -SC     Bending/Stooping 5  -SC     Standing 5  -SC      Reaching 2  -SC     From the list, choose the 3 activities you would most like to be able to do without any difficulty Standing;Reaching;Bending/stooping  -SC     Total Score Optimal - 3 12  -SC               User Key  (r) = Recorded By, (t) = Taken By, (c) = Cosigned By      Initials Name Provider Type    RB Tess Candelaria, RN Registered Nurse    Josefina Chong OT Occupational Therapist    Courtney Rocha RN Registered Nurse                    Occupational Therapy Education       Title: PT OT SLP Therapies (Done)       Topic: Occupational Therapy (Done)       Point: ADL training (Done)       Learning Progress Summary            Patient Acceptance, E, VU by SC at 4/18/2025 1429                      Point: Home exercise program (Done)       Learning Progress Summary            Patient Acceptance, E, VU by SC at 4/18/2025 1429                      Point: Precautions (Done)       Learning Progress Summary            Patient Acceptance, E, VU by SC at 4/18/2025 1429                      Point: Body mechanics (Done)       Learning Progress Summary            Patient Acceptance, E, VU by SC at 4/18/2025 1429                                      User Key       Initials Effective Dates Name Provider Type Discipline    SC 02/05/24 -  Josefina Tyler OT Occupational Therapist OT                  OT Recommendation and Plan  Planned Therapy Interventions (OT): activity tolerance training, strengthening exercise, transfer/mobility retraining, BADL retraining, occupation/activity based interventions, functional balance retraining, patient/caregiver education/training  Therapy Frequency (OT): 5 times/wk  Plan of Care Review  Plan of Care Reviewed With: patient  Progress: no change (Evaluation)  Outcome Evaluation: Patient presents with limitations of strength, and activity tolerance which impede her ability to perform ADLs/transfers as prior.  The skills of a therapist will be required to safely and effectively implement  treatment plan to restore maximum level function.     Time Calculation:   Evaluation Complexity (OT)  Review Occupational Profile/Medical/Therapy History Complexity: expanded/moderate complexity  Assessment, Occupational Performance/Identification of Deficit Complexity: 1-3 performance deficits  Clinical Decision Making Complexity (OT): problem focused assessment/low complexity  Overall Complexity of Evaluation (OT): low complexity     Time Calculation- OT       Row Name 04/18/25 1430             Time Calculation- OT    OT Received On 04/18/25  -SC      OT Goal Re-Cert Due Date 04/27/25  -SC         Untimed Charges    OT Eval/Re-eval Minutes 32  -SC         Total Minutes    Untimed Charges Total Minutes 32  -SC       Total Minutes 32  -SC                User Key  (r) = Recorded By, (t) = Taken By, (c) = Cosigned By      Initials Name Provider Type    SC Josefina Tyler OT Occupational Therapist                  Therapy Charges for Today       Code Description Service Date Service Provider Modifiers Qty    [id number]  OT EVAL MOD COMPLEXITY 3 4/18/2025 Josefina Tyler OT GO 1                 Josefina Tyler OT  4/18/2025

## 2025-04-18 NOTE — PLAN OF CARE
Goal Outcome Evaluation:              Outcome Evaluation: Pt is alert and oriented X4, Denies pain or discomfort. SB on the cardiac monitor. Placed on bariatric bed this shift.Call light is within reach.

## 2025-04-18 NOTE — PLAN OF CARE
Goal Outcome Evaluation:  Plan of Care Reviewed With: patient        Progress: no change (Evaluation)  Outcome Evaluation: Patient presents with limitations of strength, and activity tolerance which impede her ability to perform ADLs/transfers as prior.  The skills of a therapist will be required to safely and effectively implement treatment plan to restore maximum level function.    Anticipated Discharge Disposition (OT): sub acute care setting

## 2025-04-18 NOTE — ED PROVIDER NOTES
Time: 11:53 PM EDT  Date of encounter:  4/17/2025  Independent Historian/Clinical History and Information was obtained by:   Patient, Family, and Chart    History is limited by: N/A    Chief Complaint: Generalized weakness      History of Present Illness:  Patient is a 60 y.o. year old female who presents to the emergency department for evaluation of generalized weakness    Patient describes increasing weakness shortness of breath and difficulty urinating.  States she is currently a resident at local nursing home and has been followed by nephrology for worsening kidney function.  States over the past several days she has felt worse.  She states since last Friday she has been wearing supplemental oxygen due to her difficulty in breathing.  She believes that she was treated with antibiotics but does not know which.      Patient Care Team  Primary Care Provider: Alba Guzman MD    Past Medical History:     Allergies   Allergen Reactions    Hydrochlorothiazide     Penicillin G Hives    Penicillins     Vancomycin      Past Medical History:   Diagnosis Date    Diabetes     Hypertension      Past Surgical History:   Procedure Laterality Date    KNEE SURGERY      TUBAL ABDOMINAL LIGATION       Family History   Problem Relation Age of Onset    Heart disease Father        Home Medications:  Prior to Admission medications    Medication Sig Start Date End Date Taking? Authorizing Provider   acetaminophen (TYLENOL) 325 MG tablet  1/28/24   Amarjit Dodge MD   amLODIPine (NORVASC) 10 MG tablet  4/3/24   Amarjit Dodge MD   atorvastatin (LIPITOR) 40 MG tablet  10/31/23   Amarjit Dodge MD   buprenorphine-naloxone (SUBOXONE) 8-2 MG per SL tablet Place 1 tablet under the tongue Daily.    Amarjit Dodge MD   Cholecalciferol (Vitamin D3) 1.25 MG (94400 UT) capsule  8/4/24   Amarjit Dodge MD   cyclobenzaprine (FLEXERIL) 10 MG tablet  8/29/21   Amarjit Dodge MD   erythromycin  (ROMYCIN) 5 MG/GM ophthalmic ointment  7/26/24   Amarjit Dodge MD   Farxiga 10 MG tablet  9/12/24   Amarjit Dodge MD   fluticasone (FLONASE) 50 MCG/ACT nasal spray  8/30/24   Amarjit Dodge MD   furosemide (LASIX) 20 MG tablet Take 1 tablet by mouth 2 (Two) Times a Day.    Amarjit Dodge MD   gabapentin (NEURONTIN) 400 MG capsule  1/16/23   Amarjit Dodge MD   glucagon (GLUCAGEN) 1 MG injection Inject 1 mg under the skin into the appropriate area as directed 1 (One) Time As Needed for Low Blood Sugar.    Amarjit Dodge MD   Glucagon HCl (Glucagon Emergency) 1 MG/ML reconstituted solution  2/6/23   Amarjit Dodge MD   HYDROcodone-acetaminophen (NORCO) 5-325 MG per tablet Take 1 tablet by mouth Every 4 (Four) Hours As Needed.    Amarjit Dodge MD   Insulin Aspart FlexPen 100 UNIT/ML solution pen-injector  7/11/24   Amarjit Dodge MD   Insulin Glargine (BASAGLAR KWIKPEN) 100 UNIT/ML injection pen  9/14/24   Amarjit Dodge MD   Insulin Lispro (humaLOG) 100 UNIT/ML injection  7/31/24   Amarjit Dodge MD   Invokana 100 MG tablet tablet  6/20/22   Amarjit Dodge MD   levothyroxine (SYNTHROID, LEVOTHROID) 50 MCG tablet  7/27/21   Amarjit Ddoge MD   levothyroxine (SYNTHROID, LEVOTHROID) 75 MCG tablet  9/11/24   Amarjit Dodge MD   loratadine (CLARITIN) 10 MG tablet  7/11/24   Amarjit Dodge MD   losartan (COZAAR) 100 MG tablet  9/13/21   Amarjit Dodge MD   magnesium hydroxide (MILK OF MAGNESIA) 400 MG/5ML suspension Take  by mouth Daily As Needed for Constipation.    Amarjit Dodge MD   metFORMIN (GLUCOPHAGE) 500 MG tablet  4/10/24   Amarjit Dodge MD   Ozempic, 0.25 or 0.5 MG/DOSE, 2 MG/3ML solution pen-injector  7/18/24   Amarjit Dodge MD   Ozempic, 1 MG/DOSE, 4 MG/3ML solution pen-injector  9/12/24   Amarjit Dodge MD   Rybelsus 14 MG tablet  3/20/24   Amarjit Dodge MD  "  sore muscle (ICY HOT EXTRA STRENGTH) 10-30 % cream cream Apply 1 Application topically to the appropriate area as directed 3 (Three) Times a Day As Needed.    Amarjit Dodge MD   tuberculin 5 UNIT/0.1ML injection Inject  into the appropriate area of the skin as directed by provider 1 (One) Time.    Amarjit Dodge MD   vitamin D (ERGOCALCIFEROL) 1.25 MG (24685 UT) capsule capsule  24   Amarjit Dodge MD        Social History:   Social History     Tobacco Use    Smoking status: Former     Current packs/day: 0.00     Average packs/day: 0.5 packs/day for 15.3 years (7.7 ttl pk-yrs)     Types: Cigarettes     Start date: 3/13/1998     Quit date: 7/15/2013     Years since quittin.7    Smokeless tobacco: Never   Vaping Use    Vaping status: Never Used   Substance Use Topics    Alcohol use: No    Drug use: Defer         Review of Systems:  Review of Systems   Constitutional:  Positive for fatigue. Negative for chills and fever.   HENT:  Negative for congestion, ear pain and sore throat.    Eyes:  Negative for pain.   Respiratory:  Positive for shortness of breath. Negative for cough and chest tightness.    Cardiovascular:  Negative for chest pain.   Gastrointestinal:  Negative for abdominal pain, diarrhea, nausea and vomiting.   Genitourinary:  Positive for difficulty urinating and dysuria. Negative for flank pain and hematuria.   Musculoskeletal:  Negative for joint swelling.   Skin:  Negative for pallor.   Neurological:  Positive for weakness. Negative for seizures and headaches.   All other systems reviewed and are negative.       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   Constitutional:       General: She is " not in acute distress.     Appearance: Normal appearance. She is not toxic-appearing.      Comments: Patient appears uncomfortable in the hospital stretcher.   HENT:      Head: Normocephalic and atraumatic.      Jaw: There is normal jaw occlusion.   Eyes:      General: Lids are normal.      Extraocular Movements: Extraocular movements intact.      Conjunctiva/sclera: Conjunctivae normal.      Pupils: Pupils are equal, round, and reactive to light.   Cardiovascular:      Rate and Rhythm: Normal rate and regular rhythm.      Pulses: Normal pulses.      Heart sounds: Normal heart sounds.   Pulmonary:      Effort: Pulmonary effort is normal. No respiratory distress.      Breath sounds: Examination of the right-lower field reveals rhonchi. Examination of the left-lower field reveals rhonchi. Rhonchi present. No wheezing.   Abdominal:      General: Abdomen is flat.      Palpations: Abdomen is soft.      Tenderness: There is no abdominal tenderness. There is no guarding or rebound.   Musculoskeletal:         General: Normal range of motion.      Cervical back: Normal range of motion and neck supple.      Right lower leg: Edema present.      Left lower leg: Edema present.   Skin:     General: Skin is warm and dry.   Neurological:      Mental Status: She is alert and oriented to person, place, and time. Mental status is at baseline.   Psychiatric:         Mood and Affect: Mood normal.              Medical Decision Making:      Comorbidities that affect care:    Diabetes, hypertension, chronic kidney disease    External Notes reviewed:    Previous Clinic Note: Outpatient cardiology visit congestive heart failure 3/19/2025      The following orders were placed and all results were independently analyzed by me:  Orders Placed This Encounter   Procedures    Urine Culture - Urine,    XR Chest 1 View    [place] Draw    Comprehensive Metabolic Panel    High Sensitivity Troponin T    Magnesium    Urinalysis With Microscopic If  Indicated (No Culture) - Urine, Clean Catch    CBC Auto Differential    High Sensitivity Troponin T 1Hr    Urinalysis, Microscopic Only - Urine, Clean Catch    BNP    CBC (No Diff)    Basic Metabolic Panel    Lactic Acid, Plasma    Diet: Diabetic; Consistent Carbohydrate; Fluid Consistency: Thin (IDDSI 0)    Undress & Gown    Continuous Pulse Oximetry    Vital Signs    Orthostatic Blood Pressure    Insert Indwelling Urinary Catheter    Assess Need for Indwelling Urinary Catheter - Follow Removal Protocol    Urinary Catheter Care    Vital Signs    Intake & Output    Weigh Patient    Oral Care    Saline Lock & Maintain IV Access    Reason for COPD Admission: New Oxygen Requirements, Pneumonia; Indicate COPD Diagnosis For Problem List: Acute on chronic respiratory failure with hypoxia    Respiratory Treatment Education (MDI / Spacer / Nebulizer)    Cough / Deep Breathe    Activity - Ad Tayler    Hospitalist (on-call MD unless specified)    Inpatient Case Management  Consult    Inpatient Nephrology Consult    OT Consult: Eval & Treat ADL Performance Below Baseline    PT Consult: Eval & Treat As Tolerated; Functional Mobility Below Baseline    Oxygen Therapy- Nasal Cannula; Titrate 1-6 LPM Per SpO2; 90 - 95%    Document Pulse Oximetry - On Room Air / Home O2 Level    Oscillating Positive Expiratory Pressure (OPEP)    Incentive Spirometry    Oxygen Therapy- Nasal Cannula; Titrate 1-6 LPM Per SpO2; 90 - 95%    POC Glucose Once    POC Glucose 4x Daily Before Meals & at Bedtime    POC Glucose Once    ECG 12 Lead Dyspnea    Wound Ostomy Eval & Treat    Insert Peripheral IV    Insert Peripheral IV    Inpatient Admission    Fall Precautions    CBC & Differential    Green Top (Gel)    Lavender Top    Gold Top - SST    Light Blue Top       Medications Given in the Emergency Department:  Medications   sodium chloride 0.9 % flush 10 mL (has no administration in time range)   cefepime 2000 mg IVPB in 100 mL NS (VTB) (has  no administration in time range)   dextrose (GLUTOSE) oral gel 15 g (has no administration in time range)   dextrose (D50W) (25 g/50 mL) IV injection 25 g (has no administration in time range)   glucagon (GLUCAGEN) injection 1 mg (has no administration in time range)   insulin glargine (LANTUS, SEMGLEE) injection 15 Units (15 Units Subcutaneous Given 4/18/25 0221)   Insulin Lispro (humaLOG) injection 2-9 Units (has no administration in time range)   sodium chloride 0.9 % flush 10 mL (10 mL Intravenous Given 4/18/25 0149)   sodium chloride 0.9 % flush 10 mL (has no administration in time range)   sodium chloride 0.9 % infusion 40 mL (has no administration in time range)   heparin (porcine) 5000 UNIT/ML injection 5,000 Units (has no administration in time range)   ipratropium-albuterol (DUO-NEB) nebulizer solution 3 mL (has no administration in time range)   sennosides-docusate (PERICOLACE) 8.6-50 MG per tablet 2 tablet (has no administration in time range)     And   polyethylene glycol (MIRALAX) packet 17 g (has no administration in time range)     And   bisacodyl (DULCOLAX) EC tablet 5 mg (has no administration in time range)     And   bisacodyl (DULCOLAX) suppository 10 mg (has no administration in time range)   ondansetron (ZOFRAN) injection 4 mg (has no administration in time range)   guaiFENesin (MUCINEX) 12 hr tablet 600 mg (has no administration in time range)   guaiFENesin-dextromethorphan (ROBITUSSIN DM) 100-10 MG/5ML syrup 10 mL (has no administration in time range)   ipratropium-albuterol (DUO-NEB) nebulizer solution 3 mL (has no administration in time range)   lactated ringers infusion (100 mL/hr Intravenous Currently Infusing 4/18/25 8039)   cefepime 2000 mg IVPB in 100 mL NS (VTB) (0 mg Intravenous Stopped 4/18/25 0047)   HYDROcodone-acetaminophen (NORCO) 5-325 MG per tablet 1 tablet (1 tablet Oral Given 4/18/25 0007)   sodium chloride 0.9 % bolus 1,000 mL (1,000 mL Intravenous New Bag 4/18/25 0008)         ED Course:    ED Course as of 04/18/25 0710   Thu Apr 17, 2025   2341 My interpretation of EKG: Sinus rhythm 53, no acute ischemia [JS]      ED Course User Index  [JS] Sunil Zamora MD       Labs:    Lab Results (last 24 hours)       Procedure Component Value Units Date/Time    CBC & Differential [[id number]]  (Abnormal) Collected: 04/17/25 2139    Specimen: Blood Updated: 04/17/25 2147    Narrative:      The following orders were created for panel order CBC & Differential.  Procedure                               Abnormality         Status                     ---------                               -----------         ------                     CBC Auto Differential[[id number]]        Abnormal            Final result                 Please view results for these tests on the individual orders.    Comprehensive Metabolic Panel [[id number]]  (Abnormal) Collected: 04/17/25 2139    Specimen: Blood Updated: 04/17/25 2204     Glucose 217 mg/dL      BUN 78 mg/dL      Creatinine 5.43 mg/dL      Sodium 134 mmol/L      Potassium 5.8 mmol/L      Chloride 103 mmol/L      CO2 16.5 mmol/L      Calcium 8.5 mg/dL      Total Protein 6.2 g/dL      Albumin 3.1 g/dL      ALT (SGPT) 22 U/L      AST (SGOT) 24 U/L      Alkaline Phosphatase 119 U/L      Total Bilirubin 0.2 mg/dL      Globulin 3.1 gm/dL      A/G Ratio 1.0 g/dL      BUN/Creatinine Ratio 14.4     Anion Gap 14.5 mmol/L      eGFR 8.6 mL/min/1.73     Narrative:      GFR Categories in Chronic Kidney Disease (CKD)      GFR Category          GFR (mL/min/1.73)    Interpretation  G1                     90 or greater         Normal or high (1)  G2                      60-89                Mild decrease (1)  G3a                   45-59                Mild to moderate decrease  G3b                   30-44                Moderate to severe decrease  G4                    15-29                Severe decrease  G5                    14 or less           Kidney failure          (1)In the  absence of evidence of kidney disease, neither GFR category G1 or G2 fulfill the criteria for CKD.    eGFR calculation 2021 CKD-EPI creatinine equation, which does not include race as a factor    High Sensitivity Troponin T [[id number]]  (Abnormal) Collected: 04/17/25 2139    Specimen: Blood Updated: 04/17/25 2234     HS Troponin T 97 ng/L     Narrative:      High Sensitive Troponin T Reference Range:  <14.0 ng/L- Negative Female for AMI  <22.0 ng/L- Negative Male for AMI  >=14 - Abnormal Female indicating possible myocardial injury.  >=22 - Abnormal Male indicating possible myocardial injury.   Clinicians would have to utilize clinical acumen, EKG, Troponin, and serial changes to determine if it is an Acute Myocardial Infarction or myocardial injury due to an underlying chronic condition.         Magnesium [[id number]]  (Abnormal) Collected: 04/17/25 2139    Specimen: Blood Updated: 04/17/25 2204     Magnesium 2.7 mg/dL     CBC Auto Differential [[id number]]  (Abnormal) Collected: 04/17/25 2139    Specimen: Blood Updated: 04/17/25 2147     WBC 9.76 10*3/mm3      RBC 3.31 10*6/mm3      Hemoglobin 9.1 g/dL      Hematocrit 30.3 %      MCV 91.5 fL      MCH 27.5 pg      MCHC 30.0 g/dL      RDW 15.8 %      RDW-SD 53.0 fl      MPV 10.6 fL      Platelets 201 10*3/mm3      Neutrophil % 79.6 %      Lymphocyte % 10.3 %      Monocyte % 8.4 %      Eosinophil % 1.1 %      Basophil % 0.3 %      Immature Grans % 0.3 %      Neutrophils, Absolute 7.76 10*3/mm3      Lymphocytes, Absolute 1.01 10*3/mm3      Monocytes, Absolute 0.82 10*3/mm3      Eosinophils, Absolute 0.11 10*3/mm3      Basophils, Absolute 0.03 10*3/mm3      Immature Grans, Absolute 0.03 10*3/mm3      nRBC 0.0 /100 WBC     BNP [[id number]]  (Abnormal) Collected: 04/17/25 2139    Specimen: Blood Updated: 04/17/25 2339     proBNP 5,769.0 pg/mL     Narrative:      This assay is used as an aid in the diagnosis of individuals suspected of having heart failure. It can be used as  an aid in the diagnosis of acute decompensated heart failure (ADHF) in patients presenting with signs and symptoms of ADHF to the emergency department (ED). In addition, NT-proBNP of <300 pg/mL indicates ADHF is not likely.    Age Range Result Interpretation  NT-proBNP Concentration (pg/mL:      <50             Positive            >450                   Gray                 300-450                    Negative             <300    50-75           Positive            >900                  Gray                300-900                  Negative            <300      >75             Positive            >1800                  Gray                300-1800                  Negative            <300    Urinalysis With Microscopic If Indicated (No Culture) - Indwelling Urethral Catheter [[id number]]  (Abnormal) Collected: 04/17/25 2304    Specimen: Urine from Indwelling Urethral Catheter Updated: 04/17/25 2321     Color, UA Yellow     Appearance, UA Turbid     pH, UA <=5.0     Specific Gravity, UA 1.017     Glucose,  mg/dL (2+)     Ketones, UA Negative     Bilirubin, UA Negative     Blood, UA Moderate (2+)     Protein, UA >=300 mg/dL (3+)     Leuk Esterase, UA Moderate (2+)     Nitrite, UA Negative     Urobilinogen, UA 0.2 E.U./dL    Urinalysis, Microscopic Only - Indwelling Urethral Catheter [[id number]]  (Abnormal) Collected: 04/17/25 2304    Specimen: Urine from Indwelling Urethral Catheter Updated: 04/17/25 2343     RBC, UA 0-2 /HPF      WBC, UA Too Numerous to Count /HPF      Bacteria, UA Trace /HPF      Squamous Epithelial Cells, UA 0-2 /HPF      Transitional Epithelial Cells, UA 0-2 /HPF      Renal Epithelial Cells, UA 0-2 /HPF      Hyaline Casts, UA 3-6 /LPF      Granular Casts, UA 3-6 /LPF      Methodology Manual Light Microscopy    Urine Culture - Urine, Indwelling Urethral Catheter [[id number]] Collected: 04/17/25 2304    Specimen: Urine from Indwelling Urethral Catheter Updated: 04/17/25 2354    High Sensitivity  Troponin T 1Hr [[id number]]  (Abnormal) Collected: 04/17/25 2334    Specimen: Blood Updated: 04/18/25 0015     HS Troponin T 97 ng/L      Troponin T Numeric Delta 0 ng/L      Troponin T % Delta 0    Narrative:      High Sensitive Troponin T Reference Range:  <14.0 ng/L- Negative Female for AMI  <22.0 ng/L- Negative Male for AMI  >=14 - Abnormal Female indicating possible myocardial injury.  >=22 - Abnormal Male indicating possible myocardial injury.   Clinicians would have to utilize clinical acumen, EKG, Troponin, and serial changes to determine if it is an Acute Myocardial Infarction or myocardial injury due to an underlying chronic condition.         POC Glucose Once [[id number]]  (Abnormal) Collected: 04/18/25 0216    Specimen: Blood Updated: 04/18/25 0217     Glucose 171 mg/dL      Comment: Serial Number: [id number]:  [id number]       Lactic Acid, Plasma [[id number]]  (Normal) Collected: 04/18/25 0422    Specimen: Blood Updated: 04/18/25 0446     Lactate 0.6 mmol/L     Basic Metabolic Panel [[id number]] Collected: 04/18/25 0626    Specimen: Blood from Chest Updated: 04/18/25 0656             Imaging:    XR Chest 1 View  Result Date: 4/17/2025  XR CHEST 1 VW Date of Exam: 4/17/2025 9:53 PM EDT Indication: Weak/Dizzy/AMS triage protocol Comparison: None available. Findings: Heart is enlarged. Pulmonary vascularity is normal. There is infiltrate or atelectasis at the right lung base. Left lung is clear. No pneumothorax.     1.Cardiomegaly. 2.Infiltrate or atelectasis at the right lung base. Electronically Signed: Maurice Griggs MD  4/17/2025 9:57 PM EDT  Workstation ID: [id number]        Differential Diagnosis and Discussion:    Weakness: Based on the patient's history, signs, and symptoms, the diffential diagnosis includes but is not limited to meningitis, stroke, sepsis, subarachnoid hemorrhage, intracranial bleeding, encephalitis, acute uti, dehydration, MS, myasthenia gravis, Guillan [place], migraine variant,  neuromuscular disorders vertigo, electrolyte imbalance, and metabolic disorders.    PROCEDURES:    Labs were collected in the emergency department and all labs were reviewed and interpreted by me.  X-ray were performed in the emergency department and all X-ray impressions were independently interpreted by me.  An EKG was performed and the EKG was interpreted by me.    ECG 12 Lead Dyspnea   Preliminary Result   HEART RATE=53  bpm   RR Ehbifngo=1849  ms   OR Mkhelfhy=972  ms   P Horizontal Axis=129  deg   P Front Axis=-70  deg   QRSD Interval=94  ms   QT Esobjzlk=063  ms   JEfX=097  ms   QRS Axis=-61  deg   T Wave Axis=38  deg   - ABNORMAL ECG -   Sinus or ectopic atrial rhythm   LAD, consider left anterior fascicular block   Low voltage, precordial leads   Abnormal R-wave progression, late transition   Date and Time of Study:2025-04-17 21:36:32          Procedures    MDM     Amount and/or Complexity of Data Reviewed  Decide to obtain previous medical records or to obtain history from someone other than the patient: yes                   Sepsis criteria was met in the emergency department and the Sepsis protocol (including antibiotic administration) was initiated.      SIRS criteria considered:   1.  Temperature > 100.4 or <96.8    2.  Heart Rate > 90    3.  Respiratory Rate > 22    4.  WBC > 12K or <4K.             Severe Sepsis:     Respiratory: Mechanical Ventilation or Bipap  Hypotension: SBP > 90 or MAP < 65  Renal: Creatinine > 2  Metabolic: Lactic Acid > 2  Hematologic: Platelets < 100K or INR > 1.5  Hepatic: BILI  >  2  CNS: Sudden AMS     Septic Shock:     Severe Sepsis + Persistent hypotension or Lactic Acid > 4     Normal saline bolus, Antibiotics, and final disposition was based on these definitions.        Sepsis was recognized at 0030    Antibiotics were ordered.     30 mL/kg bolus was not indicated.       Patient did not receive the recommended 30 mL/kg fluid bolus for sepsis because it would be harmful  or detrimental to the patient.    The patient has Concern for Fluid Overload.   The patient was ordered 1L of fluids.  Patient was reassessed after fluid bolus.    The patient presents with 2 out of the 4 SIRS criteria and a suspected source for sepsis.  Patient was evaluated and placed on a cardiac monitor for fear of worsening tachycardia and life-threatening hypotension.  Patient was monitored for shock and signs of end-organ damage.  Mental status was repeatedly checked throughout the ED stay.  Medications were ordered by me which includes cefepime.  The case was discussed at length with admitting physician.     Total Critical Care time of 35 minutes. Total critical care time documented does not include time spent on separately billed procedures for services of nurses or physician assistants. I personally saw and examined the patient. I have reviewed all diagnostic interpretations and treatment plans as written. I was present for the key portions of any procedures performed and the inclusive time noted in any critical care statement. Critical care time includes patient management by me, time spent at the patients bedside,  time to review lab and imaging results, discussing patient care, documentation in the medical record, and time spent with family or caregiver.    Patient Care Considerations:          Consultants/Shared Management Plan:    Hospitalist: I have discussed the case with the hospitalist who agrees to accept the patient for admission.    Social Determinants of Health:    Patient is a nursing home/assisted living resident and has reliable access to care.      Disposition and Care Coordination:    Admit:   Through independent evaluation of the patient's history, physical, and imperical data, the patient meets criteria for inpatient admission to the hospital.        Final diagnoses:   Acute renal failure, unspecified acute renal failure type   Acute UTI   Right lower lobe pulmonary infiltrate        ED  Disposition       ED Disposition   Decision to Admit    Condition   --    Comment   Level of Care: Telemetry [5]   Diagnosis: Acute renal failure [398231]   Admitting Physician: QUINN POPE [[id number]]   Certification: I Certify That Inpatient Hospital Services Are Medically Necessary For Greater Than 2 Midnights                 This medical record created using voice recognition software.             Sunil Zamora MD  04/18/25 0743

## 2025-04-18 NOTE — CONSULTS
[facility]   Consult Note    Patient Name: Desi Krishnan  : 1965  MRN: [id number]  Primary Care Physician:  Alba Guzman MD  Referring Physician: No ref. provider found  Date of admission: 2025    Subjective   Subjective     Reason for Consult/ Chief Complaint:     HPI:  Desi Krishnan is a 60 y.o. female who is very well-known to our practice ,with past medical history significant for diabetes mellitus peripheral vascular disease status post bilateral amputations essential hypertension hypothyroidism morbid obesity peripheral diabetic neuropathy CKD stage IIIb/4 coronary artery disease is a longstanding nursing home resident was brought to the emergency room because of worsening shortness of breath worsening swelling and generalized weakness.  She denies any fever chills chest pain.  Apparently patient was hypoxic in the nursing home.  In the emergency room chest x-ray cardiomegaly.  Labs in the emergency room showed potassium of 6.2 bicarb of 13 BUN 92 creatinine 5.5 her baseline creatinine has been around 3.  Patient had 12 g of proteinuria and has been having progressive rapid decline in renal function because of massive proteinuria from diabetic nephropathy.  When I see the patient she is awake alert responsive not in any acute distress  Review of Systems  All review of systems negative except as given below.    Personal History     Past Medical History:   Diagnosis Date    Diabetes     History of anemia due to chronic kidney disease 2025    Hypertension        Past Surgical History:   Procedure Laterality Date    KNEE SURGERY      TUBAL ABDOMINAL LIGATION         Family History: family history includes Heart disease in her father. Otherwise pertinent FHx was reviewed and not pertinent to current issue.    Social History:  reports that she quit smoking about 11 years ago. Her smoking use included cigarettes. She started smoking about 27 years ago. She has a 7.7 pack-year  smoking history. She has never used smokeless tobacco. Drug use questions deferred to the physician. She reports that she does not drink alcohol.    Home Medications:  BASAGLAR KWIKPEN, Canagliflozin, Glucagon Emergency, HYDROcodone-acetaminophen, Insulin Aspart FlexPen, Insulin Lispro, Semaglutide, Semaglutide (1 MG/DOSE), Semaglutide(0.25 or 0.5MG/DOS), Vitamin D3, acetaminophen, amLODIPine, atorvastatin, buprenorphine-naloxone, cyclobenzaprine, dapagliflozin Propanediol, erythromycin, fluticasone, furosemide, gabapentin, glucagon, levothyroxine, loratadine, losartan, magnesium hydroxide, metFORMIN, sore muscle, tuberculin, and vitamin D    Allergies:  Allergies   Allergen Reactions    Hydrochlorothiazide     Penicillin G Hives    Penicillins     Vancomycin        Objective    Objective     Vitals:   Temp:  [98.3 °F (36.8 °C)-98.6 °F (37 °C)] 98.6 °F (37 °C)  Heart Rate:  [45-55] 55  Resp:  [18-20] 18  BP: (116-157)/(43-68) 133/54  Flow (L/min) (Oxygen Therapy):  [2-3] 3    Physical Exam:             Constitutional:         Awake, alert responsive, conversant, no obvious distress   Eyes:                       PERRLA, sclerae anicteric, no conjunctival injection   HEENT:                   Moist mucous membranes, no nasal or eye discharge, no throat congestion   Neck:                      Supple, no thyromegaly, no lymphadenopathy, trachea midline, no elevated JVD   Respiratory:           Clear to auscultation bilaterally, nonlabored respirations    Cardiovascular:     RRR, no murmurs, rubs, or gallops, palpable pedal pulses bilaterally,  bilateral ankle edema   Gastrointestinal:   Positive bowel sounds, soft, nontender, non-distended, no organomegaly   Musculoskeletal:  No clubbing or cyanosis to extremities, muscle wasting, joint swelling, muscle weakness   Psychiatric:              Appropriate affect, cooperative   Neurologic:            Awake alert, oriented x 3, strength symmetric in all extremities, Cranial  Nerves grossly intact to confrontation, speech clear   Skin:                      No rashes, bruising, skin ulcers, petechiae or ecchymosis    Result Review    Result Review:  I have personally reviewed the results from the time of this admission to 4/18/2025 08:45 EDT and agree with these findings:  []  Laboratory  []  Microbiology  []  Radiology  []  EKG/Telemetry   []  Cardiology/Vascular   []  Pathology  []  Old records  []  Other:    Results from last 7 days   Lab Units 04/17/25  2139   WBC 10*3/mm3 9.76   HEMOGLOBIN g/dL 9.1*   PLATELETS 10*3/mm3 201     Results from last 7 days   Lab Units 04/18/25  0626 04/17/25  2139   SODIUM mmol/L 131* 134*   POTASSIUM mmol/L 6.2* 5.8*   CHLORIDE mmol/L 100 103   CO2 mmol/L 13.3* 16.5*   ANION GAP mmol/L 17.7* 14.5   BUN mg/dL 92* 78*   CREATININE mg/dL 5.57* 5.43*   GLUCOSE mg/dL 168* 217*   EGFR mL/min/1.73 8.3* 8.6*   CALCIUM mg/dL 8.2* 8.5*   MAGNESIUM mg/dL  --  2.7*   ALBUMIN g/dL  --  3.1*   BILIRUBIN mg/dL  --  0.2   ALK PHOS U/L  --  119*   ALT (SGPT) U/L  --  22   AST (SGOT) U/L  --  24       Assessment & Plan   Assessment / Plan     Active Hospital Problems:  Active Hospital Problems    Diagnosis     **Acute on chronic respiratory failure with hypoxia     Acute kidney injury superimposed on chronic kidney disease     Right lower lobe pulmonary infiltrate     Acute UTI     Hyperkalemia     Metabolic acidosis     Acute on chronic diastolic (congestive) heart failure     History of anemia due to chronic kidney disease     Diabetic nephropathy     LVH (left ventricular hypertrophy)     CHF (congestive heart failure), NYHA class II, unspecified failure chronicity, diastolic     SOB (shortness of breath)     Obesity, Class III, BMI 40-49.9 (morbid obesity)        Plan:   Correct metabolic acidosis with sodium bicarbonate and which will help to lower the potassium  Aggressive diuresis because of acute on chronic CHF and massive anasarca  Check iron profile  Fluid and  salt restriction  Most likely patient is going to need dialysis  Hold antihypertensive medications so we will be able to diurese the patient  Electronically signed by Stella Chong MD, 04/18/25, 8:37 AM EDT.

## 2025-04-18 NOTE — SIGNIFICANT NOTE
Wound Eval / Progress Noted    DUSTY Arnett     Patient Name: Desi Krishnan  : 1965  MRN: [id number]  Today's Date: 2025                 Admit Date: 2025    Visit Dx:    ICD-10-CM ICD-9-CM   1. Acute renal failure, unspecified acute renal failure type  N17.9 584.9   2. Acute UTI  N39.0 599.0   3. Right lower lobe pulmonary infiltrate  R91.8 793.19         Acute on chronic respiratory failure with hypoxia    Obesity, Class III, BMI 40-49.9 (morbid obesity)    CHF (congestive heart failure), NYHA class II, unspecified failure chronicity, diastolic    LVH (left ventricular hypertrophy)    SOB (shortness of breath)    Acute kidney injury superimposed on chronic kidney disease    Right lower lobe pulmonary infiltrate    Acute UTI    Hyperkalemia    Metabolic acidosis    Acute on chronic diastolic (congestive) heart failure    History of anemia due to chronic kidney disease    Diabetic nephropathy        Past Medical History:   Diagnosis Date    Diabetes     History of anemia due to chronic kidney disease 2025    Hypertension         Past Surgical History:   Procedure Laterality Date    KNEE SURGERY      TUBAL ABDOMINAL LIGATION           Physical Assessment:  Wound 25 015 Right upper abdomen (Active)   Wound Image   25 015   Dressing Appearance open to air 25 0930   Closure None 25 09   Base red;moist;yellow 25 09   Periwound intact;dry;yeast;pink 25 09   Periwound Temperature warm 25 0930   Periwound Skin Turgor soft 25 09   Edges open;rolled/closed 25 0930   Drainage Characteristics/Odor serosanguineous 25 09   Drainage Amount scant 25 0930   Care, Wound cleansed with;sterile normal saline 25 0930   Dressing Care open to air 25 0930       Wound 25 015 Right mid axillary (Active)   Wound Image   25 015   Dressing Appearance open to air 25 0511   Base blanchable;moist;red 25 0511    Care, Wound cleansed with;soap and water 04/18/25 0511   Dressing Care skin barrier agent applied;other (see comments) 04/18/25 0511       Wound 04/18/25 0201 Right lower flank (Active)   Wound Image   04/18/25 0201   Dressing Appearance open to air 04/18/25 0930   Closure None 04/18/25 0930   Base pink;moist 04/18/25 0930   Periwound intact;dry;pink;yeast 04/18/25 0930   Periwound Temperature warm 04/18/25 0930   Periwound Skin Turgor soft 04/18/25 0930   Edges rolled/closed;open 04/18/25 0930   Drainage Characteristics/Odor serosanguineous 04/18/25 0930   Drainage Amount scant 04/18/25 0930   Care, Wound cleansed with;sterile normal saline 04/18/25 0930   Dressing Care open to air 04/18/25 0930       Wound 04/18/25 0202 Right anterior hip (Active)   Wound Image   04/18/25 0202   Dressing Appearance open to air 04/18/25 0930   Closure None 04/18/25 0930   Base pink;red;moist 04/18/25 0930   Periwound intact;dry;pink;yeast 04/18/25 0930   Periwound Temperature warm 04/18/25 0930   Periwound Skin Turgor soft 04/18/25 0930   Edges rolled/closed;open 04/18/25 0930   Drainage Characteristics/Odor serosanguineous 04/18/25 0930   Drainage Amount scant 04/18/25 0930   Care, Wound cleansed with;sterile normal saline 04/18/25 0930   Dressing Care open to air 04/18/25 0930       Wound 04/18/25 0204 Left anterior hip (Active)   Wound Image   04/18/25 0204   Dressing Appearance open to air 04/18/25 0930   Closure None 04/18/25 0930   Base red;moist;dry;yellow 04/18/25 0930   Periwound intact;dry;yeast 04/18/25 0930   Periwound Temperature warm 04/18/25 0930   Periwound Skin Turgor soft 04/18/25 0930   Edges rolled/closed;open 04/18/25 0930   Drainage Characteristics/Odor sanguineous;yellow 04/18/25 0930   Drainage Amount small 04/18/25 0930   Care, Wound cleansed with;sterile normal saline 04/18/25 0930   Dressing Care open to air 04/18/25 0930       Wound 04/18/25 sacral spine Pressure Injury (Active)   Wound Image    04/18/25 0930   Pressure Injury Stage DTPI 04/18/25 0930   Dressing Appearance open to air 04/18/25 0930   Closure None 04/18/25 0930   Base maroon/purple;nonblanchable;dry 04/18/25 0930   Periwound intact;dry;redness 04/18/25 0930   Periwound Temperature warm 04/18/25 0930   Periwound Skin Turgor soft 04/18/25 0930   Edges rolled/closed 04/18/25 0930   Drainage Amount none 04/18/25 0930   Care, Wound cleansed with;sterile normal saline 04/18/25 0930   Dressing Care open to air 04/18/25 0930       Wound (NICU) 04/18/25 0208 Left anterior thigh (Active)   Wound Image   04/18/25 0208   Dressing Appearance intact;dry 04/18/25 0930   Closure None 04/18/25 0930   Base red;moist 04/18/25 0930   Periwound intact;dry;pink;yeast 04/18/25 0930   Periwound Temperature warm 04/18/25 0930   Periwound Skin Turgor soft 04/18/25 0930   Edges rolled/closed;open 04/18/25 0930   Drainage Characteristics/Odor serosanguineous 04/18/25 0930   Drainage Amount scant 04/18/25 0930   Care, Wound cleansed with;sterile normal saline 04/18/25 0930   Dressing Care open to air 04/18/25 0930        Wound Check / Follow-up: Patient was seen today for a wound consult.  Patient is awake, alert and oriented at the time of assessment; patient was agreeable to the visit.  Patient is a long-term resident at nursing facility.    Patient with significant moisture associated skin damage to abdominal folds, groin, under bilateral breasts, and behind knee folds.  Superficial erosion noted to all affected areas with noted satellite lesions and fungal presentation.  Recommending twice daily application of miconazole powder to the folds and creases and apply pillowcases to assist with moisture management.  Discussed with hospitalist PA, PA was agreeable to treatment.    Patient has bilateral deep tissue injuries to the left and right aspect of the sacrum that are linear in appearance.  Tissue is intact with maroon-purple nonblanchable tissue.  Recommending 3  times daily application of the blue top moisture barrier and leave open to air.  Implement every 2 hours turns and offload at all times.  Keep the patient clean and free from moisture.  Specialty mattress has been ordered for the patient.    Impression: MASD to abdominal folds, groin, under bilateral breasts; deep tissue pressure injuries    Short term goals: Regain skin integrity protection, moisture prevention, pressure reduction, topical treatment, quality skin care and hygiene.    Brenda Tejada RN    4/18/2025    10:46 EDT

## 2025-04-19 LAB
ALBUMIN SERPL-MCNC: 2.5 G/DL (ref 3.5–5.2)
ALBUMIN/GLOB SERPL: 0.9 G/DL
ALP SERPL-CCNC: 88 U/L (ref 39–117)
ALT SERPL W P-5'-P-CCNC: 16 U/L (ref 1–33)
ANION GAP SERPL CALCULATED.3IONS-SCNC: 14.5 MMOL/L (ref 5–15)
AST SERPL-CCNC: 9 U/L (ref 1–32)
BASOPHILS # BLD AUTO: 0.02 10*3/MM3 (ref 0–0.2)
BASOPHILS NFR BLD AUTO: 0.3 % (ref 0–1.5)
BILIRUB SERPL-MCNC: 0.2 MG/DL (ref 0–1.2)
BUN SERPL-MCNC: 96 MG/DL (ref 6–20)
BUN/CREAT SERPL: 16.3 (ref 7–25)
CALCIUM SPEC-SCNC: 8 MG/DL (ref 8.6–10.5)
CHLORIDE SERPL-SCNC: 102 MMOL/L (ref 98–107)
CO2 SERPL-SCNC: 19.5 MMOL/L (ref 22–29)
CREAT SERPL-MCNC: 5.89 MG/DL (ref 0.57–1)
DEPRECATED RDW RBC AUTO: 53 FL (ref 37–54)
EGFRCR SERPLBLD CKD-EPI 2021: 7.8 ML/MIN/1.73
EOSINOPHIL # BLD AUTO: 0.24 10*3/MM3 (ref 0–0.4)
EOSINOPHIL NFR BLD AUTO: 3.8 % (ref 0.3–6.2)
ERYTHROCYTE [DISTWIDTH] IN BLOOD BY AUTOMATED COUNT: 15.8 % (ref 12.3–15.4)
GLOBULIN UR ELPH-MCNC: 2.9 GM/DL
GLUCOSE BLDC GLUCOMTR-MCNC: 114 MG/DL (ref 70–99)
GLUCOSE BLDC GLUCOMTR-MCNC: 156 MG/DL (ref 70–99)
GLUCOSE BLDC GLUCOMTR-MCNC: 171 MG/DL (ref 70–99)
GLUCOSE SERPL-MCNC: 104 MG/DL (ref 65–99)
HCT VFR BLD AUTO: 25.3 % (ref 34–46.6)
HGB BLD-MCNC: 7.7 G/DL (ref 12–15.9)
IMM GRANULOCYTES # BLD AUTO: 0.04 10*3/MM3 (ref 0–0.05)
IMM GRANULOCYTES NFR BLD AUTO: 0.6 % (ref 0–0.5)
LYMPHOCYTES # BLD AUTO: 1 10*3/MM3 (ref 0.7–3.1)
LYMPHOCYTES NFR BLD AUTO: 16 % (ref 19.6–45.3)
MAGNESIUM SERPL-MCNC: 2.8 MG/DL (ref 1.6–2.6)
MCH RBC QN AUTO: 27.6 PG (ref 26.6–33)
MCHC RBC AUTO-ENTMCNC: 30.4 G/DL (ref 31.5–35.7)
MCV RBC AUTO: 90.7 FL (ref 79–97)
MONOCYTES # BLD AUTO: 0.58 10*3/MM3 (ref 0.1–0.9)
MONOCYTES NFR BLD AUTO: 9.3 % (ref 5–12)
NEUTROPHILS NFR BLD AUTO: 4.38 10*3/MM3 (ref 1.7–7)
NEUTROPHILS NFR BLD AUTO: 70 % (ref 42.7–76)
NRBC BLD AUTO-RTO: 0 /100 WBC (ref 0–0.2)
PHOSPHATE SERPL-MCNC: 8.6 MG/DL (ref 2.5–4.5)
PLATELET # BLD AUTO: 177 10*3/MM3 (ref 140–450)
PMV BLD AUTO: 11.1 FL (ref 6–12)
POTASSIUM SERPL-SCNC: 4.9 MMOL/L (ref 3.5–5.2)
PROT SERPL-MCNC: 5.4 G/DL (ref 6–8.5)
RBC # BLD AUTO: 2.79 10*6/MM3 (ref 3.77–5.28)
SODIUM SERPL-SCNC: 136 MMOL/L (ref 136–145)
WBC NRBC COR # BLD AUTO: 6.26 10*3/MM3 (ref 3.4–10.8)

## 2025-04-19 PROCEDURE — 94664 DEMO&/EVAL PT USE INHALER: CPT

## 2025-04-19 PROCEDURE — 85025 COMPLETE CBC W/AUTO DIFF WBC: CPT

## 2025-04-19 PROCEDURE — 63710000001 INSULIN GLARGINE PER 5 UNITS: Performed by: FAMILY MEDICINE

## 2025-04-19 PROCEDURE — 25010000002 FUROSEMIDE PER 20 MG: Performed by: STUDENT IN AN ORGANIZED HEALTH CARE EDUCATION/TRAINING PROGRAM

## 2025-04-19 PROCEDURE — 94799 UNLISTED PULMONARY SVC/PX: CPT

## 2025-04-19 PROCEDURE — 80053 COMPREHEN METABOLIC PANEL: CPT

## 2025-04-19 PROCEDURE — 25810000003 SODIUM CHLORIDE 0.9 % SOLUTION 500 ML FLEX CONT: Performed by: FAMILY MEDICINE

## 2025-04-19 PROCEDURE — 25010000002 NA FERRIC GLUC CPLX PER 12.5 MG: Performed by: STUDENT IN AN ORGANIZED HEALTH CARE EDUCATION/TRAINING PROGRAM

## 2025-04-19 PROCEDURE — 84100 ASSAY OF PHOSPHORUS: CPT

## 2025-04-19 PROCEDURE — 25010000002 CEFEPIME PER 500 MG: Performed by: FAMILY MEDICINE

## 2025-04-19 PROCEDURE — 25010000002 HEPARIN (PORCINE) PER 1000 UNITS: Performed by: FAMILY MEDICINE

## 2025-04-19 PROCEDURE — 83735 ASSAY OF MAGNESIUM: CPT

## 2025-04-19 PROCEDURE — 25810000003 SODIUM CHLORIDE 0.9 % SOLUTION: Performed by: STUDENT IN AN ORGANIZED HEALTH CARE EDUCATION/TRAINING PROGRAM

## 2025-04-19 PROCEDURE — 99232 SBSQ HOSP IP/OBS MODERATE 35: CPT | Performed by: INTERNAL MEDICINE

## 2025-04-19 PROCEDURE — 25010000002 FUROSEMIDE PER 20 MG: Performed by: INTERNAL MEDICINE

## 2025-04-19 PROCEDURE — 82948 REAGENT STRIP/BLOOD GLUCOSE: CPT

## 2025-04-19 RX ORDER — FLUTICASONE PROPIONATE 50 MCG
2 SPRAY, SUSPENSION (ML) NASAL DAILY
Status: DISCONTINUED | OUTPATIENT
Start: 2025-04-19 | End: 2025-04-25 | Stop reason: HOSPADM

## 2025-04-19 RX ORDER — FUROSEMIDE 10 MG/ML
80 INJECTION INTRAMUSCULAR; INTRAVENOUS EVERY 8 HOURS
Status: DISCONTINUED | OUTPATIENT
Start: 2025-04-19 | End: 2025-04-24

## 2025-04-19 RX ORDER — ACETAMINOPHEN 325 MG/1
650 TABLET ORAL EVERY 4 HOURS PRN
Status: DISCONTINUED | OUTPATIENT
Start: 2025-04-19 | End: 2025-04-25 | Stop reason: HOSPADM

## 2025-04-19 RX ADMIN — GUAIFENESIN 600 MG: 600 TABLET ORAL at 09:46

## 2025-04-19 RX ADMIN — CEFEPIME 2000 MG: 2 INJECTION, POWDER, FOR SOLUTION INTRAVENOUS at 00:12

## 2025-04-19 RX ADMIN — GUAIFENESIN 600 MG: 600 TABLET ORAL at 20:36

## 2025-04-19 RX ADMIN — HEPARIN SODIUM 5000 UNITS: 5000 INJECTION INTRAVENOUS; SUBCUTANEOUS at 20:35

## 2025-04-19 RX ADMIN — SODIUM BICARBONATE 1300 MG: 650 TABLET ORAL at 20:35

## 2025-04-19 RX ADMIN — MICONAZOLE NITRATE 1 APPLICATION: 2 POWDER TOPICAL at 09:48

## 2025-04-19 RX ADMIN — FUROSEMIDE 80 MG: 10 INJECTION, SOLUTION INTRAMUSCULAR; INTRAVENOUS at 20:35

## 2025-04-19 RX ADMIN — SODIUM BICARBONATE 1300 MG: 650 TABLET ORAL at 09:47

## 2025-04-19 RX ADMIN — IPRATROPIUM BROMIDE AND ALBUTEROL SULFATE 3 ML: .5; 3 SOLUTION RESPIRATORY (INHALATION) at 07:08

## 2025-04-19 RX ADMIN — FUROSEMIDE 80 MG: 10 INJECTION, SOLUTION INTRAMUSCULAR; INTRAVENOUS at 14:33

## 2025-04-19 RX ADMIN — METOLAZONE 10 MG: 5 TABLET ORAL at 09:47

## 2025-04-19 RX ADMIN — FUROSEMIDE 80 MG: 10 INJECTION, SOLUTION INTRAMUSCULAR; INTRAVENOUS at 04:33

## 2025-04-19 RX ADMIN — HEPARIN SODIUM 5000 UNITS: 5000 INJECTION INTRAVENOUS; SUBCUTANEOUS at 09:47

## 2025-04-19 RX ADMIN — INSULIN GLARGINE 15 UNITS: 100 INJECTION, SOLUTION SUBCUTANEOUS at 20:35

## 2025-04-19 RX ADMIN — SODIUM CHLORIDE 250 MG: 9 INJECTION, SOLUTION INTRAVENOUS at 09:48

## 2025-04-19 RX ADMIN — FLUTICASONE PROPIONATE 2 SPRAY: 50 SPRAY, METERED NASAL at 14:32

## 2025-04-19 RX ADMIN — SODIUM BICARBONATE 1300 MG: 650 TABLET ORAL at 15:57

## 2025-04-19 RX ADMIN — ACETAMINOPHEN 650 MG: 325 TABLET ORAL at 15:57

## 2025-04-19 RX ADMIN — MICONAZOLE NITRATE 1 APPLICATION: 2 POWDER TOPICAL at 21:12

## 2025-04-19 RX ADMIN — Medication 10 ML: at 09:48

## 2025-04-19 RX ADMIN — Medication 10 ML: at 20:36

## 2025-04-19 NOTE — PROGRESS NOTES
[facility]     Progress Note    Patient Name: Desi Krishnan  : 1965  MRN: [id number]  Primary Care Physician:  Alba Guzman MD  Date of admission: 2025    Subjective   Patient's renal dysfunction continues to worsen  Has some response to diuresis  Blood pressures and vitals are stable  No major acute events otherwise overnight    Scheduled Meds:cefepime, 2,000 mg, Intravenous, Q24H  furosemide, 80 mg, Intravenous, Q6H  guaiFENesin, 600 mg, Oral, Q12H  heparin (porcine), 5,000 Units, Subcutaneous, Q12H  insulin glargine, 15 Units, Subcutaneous, Nightly  insulin lispro, 2-9 Units, Subcutaneous, 4x Daily AC & at Bedtime  ipratropium-albuterol, 3 mL, Nebulization, Daily - RT  metOLazone, 10 mg, Oral, Daily  miconazole, 1 Application, Topical, 2 times per day  sodium bicarbonate, 1,300 mg, Oral, TID  sodium chloride, 10 mL, Intravenous, Q12H      Continuous Infusions:   PRN Meds:.  senna-docusate sodium **AND** polyethylene glycol **AND** bisacodyl **AND** bisacodyl    dextrose    dextrose    dextrose    glucagon (human recombinant)    guaiFENesin-dextromethorphan    ipratropium-albuterol    ondansetron    sodium chloride    sodium chloride    sodium chloride       Review of Systems  Constitutional:        Weakness tiredness fatigue  Eyes:                       No blurry vision, eye discharge, eye irritation, eye pain  HEENT:                   No acute hair loss, earache and discharge, nasal congestion or discharge, sore throat, postnasal drip  Respiratory:           No shortness of breath coughing sputum production wheezing hemoptysis pleuritic chest pain  Cardiovascular:     No chest pain, orthopnea, PND, dizziness, palpitation, lower extremity edema  Gastrointestinal:   No nausea vomiting diarrhea abdominal pain constipation  Genitourinary:       No urinary incontinence, hesitancy, frequency, urgency, dysuria  Hematologic:         No bruising, bleeding, pallor, lymphadenopathy  Endocrine:             No coldness, hot flashes, polyuria, abnormal hair growth  Musculoskeletal:    No body pains, aches, arthritic pains, muscle pain ,muscle wasting  Psychiatric:          No low or high mood, anxiety, hallucinations, delusions  Skin.                      No rash, ulcers, bruising, itching  Neurological:        No confusion, headache, focal weakness, numbness, dysphasia    Objective   Objective     Vitals:   Temp:  [97.5 °F (36.4 °C)-98.4 °F (36.9 °C)] 97.5 °F (36.4 °C)  Heart Rate:  [43-68] 68  Resp:  [18] 18  BP: (100-135)/(45-58) 112/45  Flow (L/min) (Oxygen Therapy):  [3] 3  Physical Exam    Constitutional: Awake, alert responsive, conversant, no obvious distress              Psychiatric:  Appropriate affect, cooperative   Neurologic:  Awake alert ,oriented x 3, strength symmetric in all extremities, Cranial Nerves grossly intact to confrontation, speech clear   Eyes:   PERRLA, sclerae anicteric, no conjunctival injection   HEENT:  Moist mucous membranes, no nasal or eye discharge, no throat congestion   Neck:   Supple, no thyromegaly, no lymphadenopathy, trachea midline, no elevated JVD   Respiratory:  Clear to auscultation bilaterally, nonlabored respirations    Cardiovascular: RRR, no murmurs, rubs, or gallops, palpable pedal pulses bilaterally, No bilateral ankle edema   Gastrointestinal: Positive bowel sounds, soft, nontender, nondistended, no organomegaly   Musculoskeletal:  No clubbing or cyanosis to extremities,muscle wasting, joint swelling, muscle weakness             Skin:                      No rashes, bruising, skin ulcers, petechiae or ecchymosis    Result Review    Result Review:  I have personally reviewed the results from the time of this admission to 4/19/2025 07:32 EDT and agree with these findings:  []  Laboratory  []  Microbiology  []  Radiology  []  EKG/Telemetry   []  Cardiology/Vascular   []  Pathology  []  Old records  []  Other:    Assessment & Plan   Assessment / Plan        Active Hospital Problems:  Active Hospital Problems    Diagnosis     **Acute on chronic respiratory failure with hypoxia     Acute kidney injury superimposed on chronic kidney disease     Right lower lobe pulmonary infiltrate     Acute UTI     Hyperkalemia     Metabolic acidosis     Acute on chronic diastolic (congestive) heart failure     History of anemia due to chronic kidney disease     Diabetic nephropathy     LVH (left ventricular hypertrophy)     CHF (congestive heart failure), NYHA class II, unspecified failure chronicity, diastolic     SOB (shortness of breath)     Obesity, Class III, BMI 40-49.9 (morbid obesity)      60-year-old female with past medical history of longstanding diabetes complicated with peripheral vascular disease status post amputation, hypertension, hyperlipidemia, hypothyroidism, morbid obesity, neuropathy secondary to diabetes, CKD stage IV with baseline creatinine of 2.5-2.9 with nephrotic range proteinuria who came in with worsening swelling and generalized weakness noted to have a rising creatinine to 5.3 and uptrending in the setting of diuresis    Plan:   Will decrease Lasix to 80 mg IV every 8  Continue with metolazone 10 mg daily  Patient's renal dysfunction continues to worsen and patient is at high risk of ending up on renal replacement therapy.  Will likely need TDC placement  Will continue to monitor over the next 24 to 48 hours  Will give IV iron for iron deficiency anemia  Patient on antibiotics for possible pneumonia         Electronically signed by Simone Linder MD, 04/19/25, 7:32 AM EDT.

## 2025-04-19 NOTE — PROGRESS NOTES
[facility]   Hospitalist Progress Note  Date: 2025  Patient Name: Desi Krishnan  : 1965  MRN: [id number]  Date of admission: 2025      Subjective   Subjective     Chief Complaint: Generalized weakness and shortness of breath    Summary: Desi Krishnan is a 60-year-old female with a past medical history of diabetes, hypertension, debility, CKD 4, COPD, morbid obesity and GERD who presented to the emergency department from nursing home for evaluation of generalized weakness and shortness of breath and hypoxia.  Over the past week patient has been experiencing shortness of breath with associated cough, body aches and decreased appetite.  Labs in the emergency department revealed significantly reduced renal function from baseline, hyperkalemia, uremia, hyperglycemia and elevated proBNP level with elevated but flat troponin.  Urinalysis positive for UTI.  Chest x-ray revealed cardiomegaly with infiltrate in the right lung base.  Nephrology consulted.  Patient admitted to hospitalist service for further management.    Interval Followup: Seen and examined patient this morning.  Patient resting comfortably.  Patient still very tearful.  Offered reassurance.       Objective   Objective     Vitals:   Temp:  [97.5 °F (36.4 °C)-98.4 °F (36.9 °C)] 98.2 °F (36.8 °C)  Heart Rate:  [48-70] 70  Resp:  [16-18] 16  BP: (100-130)/(45-57) 106/49  Flow (L/min) (Oxygen Therapy):  [3] 3    Physical Exam   GEN: No acute distress  HEENT: Moist mucous membranes  LUNGS: Equal chest rise bilaterally.   CARDIAC: Regular rate and rhythm  NEURO: Moving all 4 extremities spontaneously  SKIN: Multiple wounds found in folds       Result Review    Result Review:  I have personally reviewed the results from the time of this admission to 2025 11:19 EDT and agree with these findings:  []  Laboratory  []  Microbiology  []  Radiology  []  EKG/Telemetry   []  Cardiology/Vascular   []  Pathology  []  Old records  []   Other:    Assessment & Plan   Assessment / Plan     Assessment:  Acute on chronic respiratory failure with hypoxia  Acute kidney injury superimposed on chronic kidney disease  Hyperkalemia--resolved  Metabolic acidosis  Iron deficiency anemia  Right lower lobe pulmonary infiltrate  UTI  Left ventricular hypertrophy  CHF (congestive heart failure), NYHA class II, unspecified failure chronicity, diastolic   Obesity, class III, BMI 40-49.9 morbid obesity        Plan:  Continue to monitor in the hospital with workup and management of the above  Appreciate recommendations from nephrology  Hyperkalemia resolved  Imaging reviewed  Continue cefepime  Follow-up urine and blood cultures  Continue aggressive diuresis with IV Lasix 80 mg every 8 hours  Continue with metolazone 10 mg daily  Continue IV iron  Avoid nephrotoxic agents  SSI  PT/OT  CBC, CMP reviewed  A.m. labs  Patient likely needing dialysis--monitor for now         Discussed plan with RN.    VTE Prophylaxis:  Pharmacologic VTE prophylaxis orders are present.        CODE STATUS:   Code Status (Patient has no pulse and is not breathing): CPR (Attempt to Resuscitate)  Medical Interventions (Patient has pulse or is breathing): Full Support    Electronically signed by Fadia Ball PA-C, 04/19/25, 11:37 AM EDT.         Patient independently seen and evaluated, agree with assessment and plan, above documentation reflects plan put forth during bedside rounds.  More than 51% of the time of this patient encounter was performed by me.    Interval history: Patient's renal dysfunction continues to worsen on aggressive IV diuretics, being adjusted by nephrology, tolerating antibiotics for UTI    GEN: No acute distress, morbidly obese  HEENT: Moist mucous membranes  LUNGS: Equal chest rise bilaterally  CARDIAC: Regular rate and rhythm  NEURO: Moving all 4 extremities spontaneously  SKIN: No obvious breakdown    Plan:  Agree with assessment and plan as above  Continue  cefepime for UTI  Follow-up urine and blood cultures, these are pending  Nephrology consulted, appreciate their recommendations  Avoid nephrotoxic agents  Decrease diuretics to 80 mg every 8 hours, monitor renal function following this change  Continue metolazone  Patient is at high risk for needing hemodialysis  Continue Guzmán catheter  IV iron  Monitor blood counts closely      Electronically signed by Shamir Flanagan MD, 4/19/2025, 11:21 EDT.

## 2025-04-19 NOTE — PLAN OF CARE
Goal Outcome Evaluation:  Plan of Care Reviewed With: patient        Progress: no change  Outcome Evaluation: Patient is very emotional and reufused turning.  After several hours and reinforcement of importance to prevent ulcers let us turn her.  Gave Tylenol for HA.  Renal function being monitored closely possible HD.  Aggressive diuresis.  care plan is ongoing.

## 2025-04-20 LAB
ALBUMIN SERPL-MCNC: 2.9 G/DL (ref 3.5–5.2)
ALBUMIN/GLOB SERPL: 1 G/DL
ALP SERPL-CCNC: 86 U/L (ref 39–117)
ALT SERPL W P-5'-P-CCNC: 14 U/L (ref 1–33)
ANION GAP SERPL CALCULATED.3IONS-SCNC: 16.1 MMOL/L (ref 5–15)
AST SERPL-CCNC: 9 U/L (ref 1–32)
BACTERIA SPEC AEROBE CULT: ABNORMAL
BASOPHILS # BLD AUTO: 0.04 10*3/MM3 (ref 0–0.2)
BASOPHILS NFR BLD AUTO: 0.6 % (ref 0–1.5)
BILIRUB SERPL-MCNC: 0.2 MG/DL (ref 0–1.2)
BUN SERPL-MCNC: 105 MG/DL (ref 6–20)
BUN/CREAT SERPL: 18.7 (ref 7–25)
CALCIUM SPEC-SCNC: 8.3 MG/DL (ref 8.6–10.5)
CHLORIDE SERPL-SCNC: 102 MMOL/L (ref 98–107)
CO2 SERPL-SCNC: 20.9 MMOL/L (ref 22–29)
CREAT SERPL-MCNC: 5.62 MG/DL (ref 0.57–1)
DEPRECATED RDW RBC AUTO: 51.6 FL (ref 37–54)
EGFRCR SERPLBLD CKD-EPI 2021: 8.2 ML/MIN/1.73
EOSINOPHIL # BLD AUTO: 0.48 10*3/MM3 (ref 0–0.4)
EOSINOPHIL NFR BLD AUTO: 7.5 % (ref 0.3–6.2)
ERYTHROCYTE [DISTWIDTH] IN BLOOD BY AUTOMATED COUNT: 15.7 % (ref 12.3–15.4)
GLOBULIN UR ELPH-MCNC: 2.9 GM/DL
GLUCOSE BLDC GLUCOMTR-MCNC: 127 MG/DL (ref 70–99)
GLUCOSE BLDC GLUCOMTR-MCNC: 153 MG/DL (ref 70–99)
GLUCOSE BLDC GLUCOMTR-MCNC: 184 MG/DL (ref 70–99)
GLUCOSE SERPL-MCNC: 115 MG/DL (ref 65–99)
HCT VFR BLD AUTO: 28 % (ref 34–46.6)
HGB BLD-MCNC: 8.5 G/DL (ref 12–15.9)
IMM GRANULOCYTES # BLD AUTO: 0.02 10*3/MM3 (ref 0–0.05)
IMM GRANULOCYTES NFR BLD AUTO: 0.3 % (ref 0–0.5)
LYMPHOCYTES # BLD AUTO: 0.96 10*3/MM3 (ref 0.7–3.1)
LYMPHOCYTES NFR BLD AUTO: 14.9 % (ref 19.6–45.3)
MAGNESIUM SERPL-MCNC: 2.8 MG/DL (ref 1.6–2.6)
MCH RBC QN AUTO: 27.3 PG (ref 26.6–33)
MCHC RBC AUTO-ENTMCNC: 30.4 G/DL (ref 31.5–35.7)
MCV RBC AUTO: 90 FL (ref 79–97)
MONOCYTES # BLD AUTO: 0.55 10*3/MM3 (ref 0.1–0.9)
MONOCYTES NFR BLD AUTO: 8.6 % (ref 5–12)
NEUTROPHILS NFR BLD AUTO: 4.38 10*3/MM3 (ref 1.7–7)
NEUTROPHILS NFR BLD AUTO: 68.1 % (ref 42.7–76)
NRBC BLD AUTO-RTO: 0 /100 WBC (ref 0–0.2)
PHOSPHATE SERPL-MCNC: 8.6 MG/DL (ref 2.5–4.5)
PLATELET # BLD AUTO: 218 10*3/MM3 (ref 140–450)
PMV BLD AUTO: 10.7 FL (ref 6–12)
POTASSIUM SERPL-SCNC: 4.5 MMOL/L (ref 3.5–5.2)
PROT SERPL-MCNC: 5.8 G/DL (ref 6–8.5)
RBC # BLD AUTO: 3.11 10*6/MM3 (ref 3.77–5.28)
SODIUM SERPL-SCNC: 139 MMOL/L (ref 136–145)
WBC NRBC COR # BLD AUTO: 6.43 10*3/MM3 (ref 3.4–10.8)

## 2025-04-20 PROCEDURE — 85025 COMPLETE CBC W/AUTO DIFF WBC: CPT

## 2025-04-20 PROCEDURE — 25010000002 HEPARIN (PORCINE) PER 1000 UNITS: Performed by: FAMILY MEDICINE

## 2025-04-20 PROCEDURE — 82948 REAGENT STRIP/BLOOD GLUCOSE: CPT

## 2025-04-20 PROCEDURE — 94799 UNLISTED PULMONARY SVC/PX: CPT

## 2025-04-20 PROCEDURE — 83735 ASSAY OF MAGNESIUM: CPT

## 2025-04-20 PROCEDURE — 94664 DEMO&/EVAL PT USE INHALER: CPT

## 2025-04-20 PROCEDURE — 25010000002 NA FERRIC GLUC CPLX PER 12.5 MG: Performed by: STUDENT IN AN ORGANIZED HEALTH CARE EDUCATION/TRAINING PROGRAM

## 2025-04-20 PROCEDURE — 80053 COMPREHEN METABOLIC PANEL: CPT

## 2025-04-20 PROCEDURE — 25010000002 CEFEPIME PER 500 MG: Performed by: FAMILY MEDICINE

## 2025-04-20 PROCEDURE — 63710000001 INSULIN GLARGINE PER 5 UNITS: Performed by: FAMILY MEDICINE

## 2025-04-20 PROCEDURE — 84100 ASSAY OF PHOSPHORUS: CPT

## 2025-04-20 PROCEDURE — 25010000002 FUROSEMIDE PER 20 MG: Performed by: STUDENT IN AN ORGANIZED HEALTH CARE EDUCATION/TRAINING PROGRAM

## 2025-04-20 PROCEDURE — 25810000003 SODIUM CHLORIDE 0.9 % SOLUTION: Performed by: STUDENT IN AN ORGANIZED HEALTH CARE EDUCATION/TRAINING PROGRAM

## 2025-04-20 PROCEDURE — 99232 SBSQ HOSP IP/OBS MODERATE 35: CPT | Performed by: INTERNAL MEDICINE

## 2025-04-20 PROCEDURE — 63710000001 INSULIN LISPRO (HUMAN) PER 5 UNITS: Performed by: FAMILY MEDICINE

## 2025-04-20 RX ORDER — HYDROCODONE BITARTRATE AND ACETAMINOPHEN 5; 325 MG/1; MG/1
1 TABLET ORAL EVERY 12 HOURS PRN
Refills: 0 | Status: DISCONTINUED | OUTPATIENT
Start: 2025-04-20 | End: 2025-04-25 | Stop reason: HOSPADM

## 2025-04-20 RX ORDER — CYCLOBENZAPRINE HCL 5 MG
5 TABLET ORAL 3 TIMES DAILY PRN
Status: DISCONTINUED | OUTPATIENT
Start: 2025-04-20 | End: 2025-04-25 | Stop reason: HOSPADM

## 2025-04-20 RX ORDER — METOLAZONE 2.5 MG/1
5 TABLET ORAL DAILY
Status: DISCONTINUED | OUTPATIENT
Start: 2025-04-21 | End: 2025-04-25

## 2025-04-20 RX ADMIN — GUAIFENESIN 600 MG: 600 TABLET ORAL at 09:35

## 2025-04-20 RX ADMIN — MICONAZOLE NITRATE 1 APPLICATION: 2 POWDER TOPICAL at 21:24

## 2025-04-20 RX ADMIN — FLUTICASONE PROPIONATE 2 SPRAY: 50 SPRAY, METERED NASAL at 09:36

## 2025-04-20 RX ADMIN — SODIUM BICARBONATE 1300 MG: 650 TABLET ORAL at 21:23

## 2025-04-20 RX ADMIN — Medication 10 ML: at 09:35

## 2025-04-20 RX ADMIN — CYCLOBENZAPRINE HYDROCHLORIDE 5 MG: 5 TABLET, FILM COATED ORAL at 21:22

## 2025-04-20 RX ADMIN — GUAIFENESIN 600 MG: 600 TABLET ORAL at 21:00

## 2025-04-20 RX ADMIN — ACETAMINOPHEN 650 MG: 325 TABLET ORAL at 18:19

## 2025-04-20 RX ADMIN — FUROSEMIDE 80 MG: 10 INJECTION, SOLUTION INTRAMUSCULAR; INTRAVENOUS at 14:04

## 2025-04-20 RX ADMIN — FUROSEMIDE 80 MG: 10 INJECTION, SOLUTION INTRAMUSCULAR; INTRAVENOUS at 04:33

## 2025-04-20 RX ADMIN — ACETAMINOPHEN 650 MG: 325 TABLET ORAL at 07:53

## 2025-04-20 RX ADMIN — SODIUM BICARBONATE 1300 MG: 650 TABLET ORAL at 09:35

## 2025-04-20 RX ADMIN — SODIUM CHLORIDE 250 MG: 9 INJECTION, SOLUTION INTRAVENOUS at 09:34

## 2025-04-20 RX ADMIN — INSULIN GLARGINE 15 UNITS: 100 INJECTION, SOLUTION SUBCUTANEOUS at 21:22

## 2025-04-20 RX ADMIN — INSULIN LISPRO 2 UNITS: 100 INJECTION, SOLUTION INTRAVENOUS; SUBCUTANEOUS at 21:22

## 2025-04-20 RX ADMIN — SODIUM BICARBONATE 1300 MG: 650 TABLET ORAL at 17:15

## 2025-04-20 RX ADMIN — Medication 10 ML: at 21:23

## 2025-04-20 RX ADMIN — ACETAMINOPHEN 650 MG: 325 TABLET ORAL at 01:37

## 2025-04-20 RX ADMIN — FUROSEMIDE 80 MG: 10 INJECTION, SOLUTION INTRAMUSCULAR; INTRAVENOUS at 21:00

## 2025-04-20 RX ADMIN — HYDROCODONE BITARTRATE AND ACETAMINOPHEN 1 TABLET: 5; 325 TABLET ORAL at 21:21

## 2025-04-20 RX ADMIN — MICONAZOLE NITRATE 1 APPLICATION: 2 POWDER TOPICAL at 09:36

## 2025-04-20 RX ADMIN — IPRATROPIUM BROMIDE AND ALBUTEROL SULFATE 3 ML: .5; 3 SOLUTION RESPIRATORY (INHALATION) at 10:18

## 2025-04-20 RX ADMIN — HEPARIN SODIUM 5000 UNITS: 5000 INJECTION INTRAVENOUS; SUBCUTANEOUS at 09:35

## 2025-04-20 RX ADMIN — BISACODYL 5 MG: 5 TABLET, COATED ORAL at 09:34

## 2025-04-20 RX ADMIN — HEPARIN SODIUM 5000 UNITS: 5000 INJECTION INTRAVENOUS; SUBCUTANEOUS at 21:01

## 2025-04-20 RX ADMIN — CEFEPIME 2000 MG: 2 INJECTION, POWDER, FOR SOLUTION INTRAVENOUS at 00:36

## 2025-04-20 NOTE — PROGRESS NOTES
[facility]   Hospitalist Progress Note  Date: 2025  Patient Name: Desi Krishnan  : 1965  MRN: [id number]  Date of admission: 2025      Subjective   Subjective     Chief Complaint: Generalized weakness and shortness of breath    Summary: Desi Krishnan is a 60-year-old female with a past medical history of diabetes, hypertension, debility, CKD 4, COPD, morbid obesity and GERD who presented to the emergency department from nursing home for evaluation of generalized weakness and shortness of breath and hypoxia.  Over the past week patient has been experiencing shortness of breath with associated cough, body aches and decreased appetite.  Labs in the emergency department revealed significantly reduced renal function from baseline, hyperkalemia, uremia, hyperglycemia and elevated proBNP level with elevated but flat troponin.  Urinalysis positive for UTI.  Chest x-ray revealed cardiomegaly with infiltrate in the right lung base.  Nephrology consulted.  Patient admitted to hospitalist service for further management.    Interval Followup: Seen and examined patient this morning.  Patient still very tearful.  Attempted to call son this morning however phone number did not work.  Could not leave a message.  Social work following to attempt to locate another phone number.  Will continue to monitor kidney function and electrolytes to hopefully avoid dialysis.      Objective   Objective     Vitals:   Temp:  [97.9 °F (36.6 °C)-99 °F (37.2 °C)] 97.9 °F (36.6 °C)  Heart Rate:  [60-70] 65  Resp:  [16-20] 18  BP: (120-141)/(48-71) 123/49  Flow (L/min) (Oxygen Therapy):  [2.5-3] 3    Physical Exam   GEN: No acute distress.  Obese  HEENT: Moist mucous membranes  LUNGS: Equal chest rise bilaterally.   CARDIAC: Regular rate and rhythm  NEURO: Moving all 4 extremities spontaneously  SKIN: Multiple wounds found in folds       Result Review    Result Review:  I have personally reviewed the results from the time of  this admission to 4/20/2025 12:04 EDT and agree with these findings:  []  Laboratory  []  Microbiology  []  Radiology  []  EKG/Telemetry   []  Cardiology/Vascular   []  Pathology  []  Old records  []  Other:    Assessment & Plan   Assessment / Plan     Assessment:  Acute on chronic respiratory failure with hypoxia  Acute kidney injury superimposed on chronic kidney disease  Hyperkalemia--resolved  Metabolic acidosis  Iron deficiency anemia  Right lower lobe pulmonary infiltrate  UTI  Left ventricular hypertrophy  CHF (congestive heart failure), NYHA class II, unspecified failure chronicity, diastolic   Obesity, class III, BMI 40-49.9 morbid obesity        Plan:  Continue to monitor in the hospital with workup and management of the above  Appreciate recommendations from nephrology  Hyperkalemia resolved  Imaging reviewed  Continue cefepime  Urine culture positive for E. coli  Continue aggressive diuresis with IV Lasix 80 mg every 8 hours  Continue with metolazone 5 mg daily  Continue IV iron  Avoid nephrotoxic agents  SSI  PT/OT  CBC, CMP reviewed  A.m. labs  Attempting to avoid dialysis as much as possible.  Will continue to monitor renal dysfunction.         Discussed plan with RN.    VTE Prophylaxis:  Pharmacologic VTE prophylaxis orders are present.        CODE STATUS:   Code Status (Patient has no pulse and is not breathing): CPR (Attempt to Resuscitate)  Medical Interventions (Patient has pulse or is breathing): Full Support    Electronically signed by Fadia Ball PA-C, 04/20/25, 12:49 PM EDT.       Patient independently seen and evaluated, agree with assessment and plan, above documentation reflects plan put forth during bedside rounds.  More than 51% of the time of this patient encounter was performed by me.    Interval history: No acute events overnight, patient resting comfortably in bed still tearful at times    GEN: No acute distress, morbidly obese  HEENT: Moist mucous membranes  LUNGS: Equal chest  rise bilaterally  CARDIAC: Regular rate and rhythm  NEURO: Moving all 4 extremities spontaneously  SKIN: No obvious breakdown    Plan:  Agree with assessment and plan as above  Continue antibiotics for UTI  Continue diuretics, monitor renal function  Nephrology consulted, appreciate their recommendations patient will likely need TDC placement  Avoid nephrotoxic agents  Metolazone being reduced  Patient progressing towards dialysis  Continue Guzmán catheter for now  IV iron again today  Monitor blood counts closely      Electronically signed by Shamir Flanagan MD, 4/20/2025, 12:04 EDT.

## 2025-04-20 NOTE — PLAN OF CARE
Goal Outcome Evaluation:  Plan of Care Reviewed With: patient        Progress: no change  Outcome Evaluation: The patient is being diuresed with lasix.  Renal function still not well.  Pending am lab for renal function.  Careplan ongoing.

## 2025-04-20 NOTE — PROGRESS NOTES
[facility]     Progress Note    Patient Name: Desi Krishnan  : 1965  MRN: [id number]  Primary Care Physician:  Alba Guzman MD  Date of admission: 2025    Subjective   Patient is responding well to diuresis  Renal dysfunction has improved somewhat  BUN is still elevated  No major acute events overnight  Intermittent episodes of confusion    Scheduled Meds:cefepime, 2,000 mg, Intravenous, Q24H  ferric gluconate, 250 mg, Intravenous, Daily  fluticasone, 2 spray, Each Nare, Daily  furosemide, 80 mg, Intravenous, Q8H  guaiFENesin, 600 mg, Oral, Q12H  heparin (porcine), 5,000 Units, Subcutaneous, Q12H  insulin glargine, 15 Units, Subcutaneous, Nightly  insulin lispro, 2-9 Units, Subcutaneous, 4x Daily AC & at Bedtime  ipratropium-albuterol, 3 mL, Nebulization, Daily - RT  metOLazone, 10 mg, Oral, Daily  miconazole, 1 Application, Topical, 2 times per day  sodium bicarbonate, 1,300 mg, Oral, TID  sodium chloride, 10 mL, Intravenous, Q12H      Continuous Infusions:   PRN Meds:.  acetaminophen    senna-docusate sodium **AND** polyethylene glycol **AND** bisacodyl **AND** bisacodyl    dextrose    dextrose    dextrose    glucagon (human recombinant)    guaiFENesin-dextromethorphan    ipratropium-albuterol    ondansetron    sodium chloride    sodium chloride    sodium chloride       Review of Systems  Constitutional:        Weakness tiredness fatigue  Eyes:                       No blurry vision, eye discharge, eye irritation, eye pain  HEENT:                   No acute hair loss, earache and discharge, nasal congestion or discharge, sore throat, postnasal drip  Respiratory:           No shortness of breath coughing sputum production wheezing hemoptysis pleuritic chest pain  Cardiovascular:     No chest pain, orthopnea, PND, dizziness, palpitation, lower extremity edema  Gastrointestinal:   No nausea vomiting diarrhea abdominal pain constipation  Genitourinary:       No urinary incontinence,  hesitancy, frequency, urgency, dysuria  Hematologic:         No bruising, bleeding, pallor, lymphadenopathy  Endocrine:            No coldness, hot flashes, polyuria, abnormal hair growth  Musculoskeletal:    No body pains, aches, arthritic pains, muscle pain ,muscle wasting  Psychiatric:          No low or high mood, anxiety, hallucinations, delusions  Skin.                      No rash, ulcers, bruising, itching  Neurological:        No confusion, headache, focal weakness, numbness, dysphasia    Objective   Objective     Vitals:   Temp:  [97.9 °F (36.6 °C)-99 °F (37.2 °C)] 97.9 °F (36.6 °C)  Heart Rate:  [60-70] 64  Resp:  [16-20] 18  BP: (120-141)/(48-71) 123/49  Flow (L/min) (Oxygen Therapy):  [2.5-3] 2.5  Physical Exam    Constitutional: Awake, alert responsive, conversant, no obvious distress              Psychiatric:  Appropriate affect, cooperative   Neurologic:  Awake alert ,oriented x 3, strength symmetric in all extremities, Cranial Nerves grossly intact to confrontation, speech clear   Eyes:   PERRLA, sclerae anicteric, no conjunctival injection   HEENT:  Moist mucous membranes, no nasal or eye discharge, no throat congestion   Neck:   Supple, no thyromegaly, no lymphadenopathy, trachea midline, no elevated JVD   Respiratory:  Clear to auscultation bilaterally, nonlabored respirations    Cardiovascular: RRR, no murmurs, rubs, or gallops, palpable pedal pulses bilaterally, No bilateral ankle edema   Gastrointestinal: Positive bowel sounds, soft, nontender, nondistended, no organomegaly   Musculoskeletal:  No clubbing or cyanosis to extremities,muscle wasting, joint swelling, muscle weakness             Skin:                      No rashes, bruising, skin ulcers, petechiae or ecchymosis    Result Review    Result Review:  I have personally reviewed the results from the time of this admission to 4/20/2025 09:22 EDT and agree with these findings:  []  Laboratory  []  Microbiology  []  Radiology  []   EKG/Telemetry   []  Cardiology/Vascular   []  Pathology  []  Old records  []  Other:    Assessment & Plan   Assessment / Plan       Active Hospital Problems:  Active Hospital Problems    Diagnosis     **Acute on chronic respiratory failure with hypoxia     Acute kidney injury superimposed on chronic kidney disease     Right lower lobe pulmonary infiltrate     Acute UTI     Hyperkalemia     Metabolic acidosis     Acute on chronic diastolic (congestive) heart failure     History of anemia due to chronic kidney disease     Diabetic nephropathy     LVH (left ventricular hypertrophy)     CHF (congestive heart failure), NYHA class II, unspecified failure chronicity, diastolic     SOB (shortness of breath)     Obesity, Class III, BMI 40-49.9 (morbid obesity)      60-year-old female with past medical history of longstanding diabetes complicated with peripheral vascular disease status post amputation, hypertension, hyperlipidemia, hypothyroidism, morbid obesity, neuropathy secondary to diabetes, CKD stage IV with baseline creatinine of 2.5-2.9 with nephrotic range proteinuria who came in with worsening swelling and generalized weakness noted to have a rising creatinine to 5.3 and uptrending in the setting of diuresis    Plan:   Continue Lasix to 80 mg IV every 8  Decrease metolazone to 5 mg  Patient's renal dysfunction continues to worsen and patient is at high risk of ending up on renal replacement therapy.  Will likely need TDC placement  Will continue to monitor over the weekend  Patient stays at a nursing home which does not have a dialysis unit.  If she is transported to a dialysis unit, based on her BMI, she would likely not be accepted to any dialysis unit.  Will try to figure out the logistics of it tomorrow.  Final resort, or option may be sending her to signature where they have in-house dialysis and based on her BMI, will likely need daily dialysis  For that reason alone, will attempt to prolong and avoid dialysis  as much as possible and will continue to monitor renal dysfunction with creatinine stable at this time and patient responding well to diuretics  Continue IV iron for iron deficiency anemia  Patient on antibiotics for possible pneumonia

## 2025-04-21 LAB
ALBUMIN SERPL-MCNC: 2.7 G/DL (ref 3.5–5.2)
ALBUMIN/GLOB SERPL: 0.9 G/DL
ALP SERPL-CCNC: 76 U/L (ref 39–117)
ALT SERPL W P-5'-P-CCNC: 11 U/L (ref 1–33)
ANION GAP SERPL CALCULATED.3IONS-SCNC: 14.6 MMOL/L (ref 5–15)
AST SERPL-CCNC: 7 U/L (ref 1–32)
BASOPHILS # BLD AUTO: 0.03 10*3/MM3 (ref 0–0.2)
BASOPHILS NFR BLD AUTO: 0.5 % (ref 0–1.5)
BILIRUB SERPL-MCNC: 0.2 MG/DL (ref 0–1.2)
BUN SERPL-MCNC: 102 MG/DL (ref 6–20)
BUN/CREAT SERPL: 19.9 (ref 7–25)
CALCIUM SPEC-SCNC: 8.3 MG/DL (ref 8.6–10.5)
CHLORIDE SERPL-SCNC: 102 MMOL/L (ref 98–107)
CO2 SERPL-SCNC: 23.4 MMOL/L (ref 22–29)
CREAT SERPL-MCNC: 5.12 MG/DL (ref 0.57–1)
DEPRECATED RDW RBC AUTO: 50.5 FL (ref 37–54)
EGFRCR SERPLBLD CKD-EPI 2021: 9.2 ML/MIN/1.73
EOSINOPHIL # BLD AUTO: 0.39 10*3/MM3 (ref 0–0.4)
EOSINOPHIL NFR BLD AUTO: 6.6 % (ref 0.3–6.2)
ERYTHROCYTE [DISTWIDTH] IN BLOOD BY AUTOMATED COUNT: 15.7 % (ref 12.3–15.4)
GLOBULIN UR ELPH-MCNC: 2.9 GM/DL
GLUCOSE BLDC GLUCOMTR-MCNC: 117 MG/DL (ref 70–99)
GLUCOSE BLDC GLUCOMTR-MCNC: 125 MG/DL (ref 70–99)
GLUCOSE BLDC GLUCOMTR-MCNC: 166 MG/DL (ref 70–99)
GLUCOSE BLDC GLUCOMTR-MCNC: 202 MG/DL (ref 70–99)
GLUCOSE SERPL-MCNC: 136 MG/DL (ref 65–99)
HCT VFR BLD AUTO: 27.2 % (ref 34–46.6)
HGB BLD-MCNC: 8.4 G/DL (ref 12–15.9)
HOLD SPECIMEN: NORMAL
IMM GRANULOCYTES # BLD AUTO: 0.01 10*3/MM3 (ref 0–0.05)
IMM GRANULOCYTES NFR BLD AUTO: 0.2 % (ref 0–0.5)
LYMPHOCYTES # BLD AUTO: 0.76 10*3/MM3 (ref 0.7–3.1)
LYMPHOCYTES NFR BLD AUTO: 12.9 % (ref 19.6–45.3)
MAGNESIUM SERPL-MCNC: 2.8 MG/DL (ref 1.6–2.6)
MCH RBC QN AUTO: 27.3 PG (ref 26.6–33)
MCHC RBC AUTO-ENTMCNC: 30.9 G/DL (ref 31.5–35.7)
MCV RBC AUTO: 88.3 FL (ref 79–97)
MONOCYTES # BLD AUTO: 0.45 10*3/MM3 (ref 0.1–0.9)
MONOCYTES NFR BLD AUTO: 7.7 % (ref 5–12)
NEUTROPHILS NFR BLD AUTO: 4.24 10*3/MM3 (ref 1.7–7)
NEUTROPHILS NFR BLD AUTO: 72.1 % (ref 42.7–76)
NRBC BLD AUTO-RTO: 0 /100 WBC (ref 0–0.2)
PHOSPHATE SERPL-MCNC: 8.7 MG/DL (ref 2.5–4.5)
PLATELET # BLD AUTO: 218 10*3/MM3 (ref 140–450)
PMV BLD AUTO: 10.4 FL (ref 6–12)
POTASSIUM SERPL-SCNC: 4.1 MMOL/L (ref 3.5–5.2)
PROT SERPL-MCNC: 5.6 G/DL (ref 6–8.5)
RBC # BLD AUTO: 3.08 10*6/MM3 (ref 3.77–5.28)
SODIUM SERPL-SCNC: 140 MMOL/L (ref 136–145)
WBC NRBC COR # BLD AUTO: 5.88 10*3/MM3 (ref 3.4–10.8)

## 2025-04-21 PROCEDURE — 94799 UNLISTED PULMONARY SVC/PX: CPT

## 2025-04-21 PROCEDURE — 25810000003 SODIUM CHLORIDE 0.9 % SOLUTION: Performed by: STUDENT IN AN ORGANIZED HEALTH CARE EDUCATION/TRAINING PROGRAM

## 2025-04-21 PROCEDURE — 25010000002 HEPARIN (PORCINE) PER 1000 UNITS: Performed by: FAMILY MEDICINE

## 2025-04-21 PROCEDURE — 85025 COMPLETE CBC W/AUTO DIFF WBC: CPT

## 2025-04-21 PROCEDURE — 25010000002 FUROSEMIDE PER 20 MG: Performed by: STUDENT IN AN ORGANIZED HEALTH CARE EDUCATION/TRAINING PROGRAM

## 2025-04-21 PROCEDURE — 82948 REAGENT STRIP/BLOOD GLUCOSE: CPT | Performed by: FAMILY MEDICINE

## 2025-04-21 PROCEDURE — 99221 1ST HOSP IP/OBS SF/LOW 40: CPT | Performed by: SURGERY

## 2025-04-21 PROCEDURE — 82948 REAGENT STRIP/BLOOD GLUCOSE: CPT

## 2025-04-21 PROCEDURE — 25010000002 ONDANSETRON PER 1 MG: Performed by: FAMILY MEDICINE

## 2025-04-21 PROCEDURE — 84100 ASSAY OF PHOSPHORUS: CPT

## 2025-04-21 PROCEDURE — 25010000002 NA FERRIC GLUC CPLX PER 12.5 MG: Performed by: STUDENT IN AN ORGANIZED HEALTH CARE EDUCATION/TRAINING PROGRAM

## 2025-04-21 PROCEDURE — 83735 ASSAY OF MAGNESIUM: CPT

## 2025-04-21 PROCEDURE — 99232 SBSQ HOSP IP/OBS MODERATE 35: CPT | Performed by: INTERNAL MEDICINE

## 2025-04-21 PROCEDURE — 63710000001 INSULIN LISPRO (HUMAN) PER 5 UNITS: Performed by: FAMILY MEDICINE

## 2025-04-21 PROCEDURE — 80053 COMPREHEN METABOLIC PANEL: CPT

## 2025-04-21 PROCEDURE — 63710000001 INSULIN GLARGINE PER 5 UNITS: Performed by: FAMILY MEDICINE

## 2025-04-21 PROCEDURE — 25010000002 CEFEPIME PER 500 MG: Performed by: FAMILY MEDICINE

## 2025-04-21 RX ORDER — CEFUROXIME AXETIL 250 MG/1
500 TABLET ORAL
Status: COMPLETED | OUTPATIENT
Start: 2025-04-22 | End: 2025-04-24

## 2025-04-21 RX ADMIN — SODIUM BICARBONATE 1300 MG: 650 TABLET ORAL at 20:56

## 2025-04-21 RX ADMIN — FUROSEMIDE 80 MG: 10 INJECTION, SOLUTION INTRAMUSCULAR; INTRAVENOUS at 21:03

## 2025-04-21 RX ADMIN — METOLAZONE 5 MG: 5 TABLET ORAL at 09:02

## 2025-04-21 RX ADMIN — SODIUM CHLORIDE 250 MG: 9 INJECTION, SOLUTION INTRAVENOUS at 09:02

## 2025-04-21 RX ADMIN — MICONAZOLE NITRATE 1 APPLICATION: 2 POWDER TOPICAL at 21:15

## 2025-04-21 RX ADMIN — GUAIFENESIN 600 MG: 600 TABLET ORAL at 09:02

## 2025-04-21 RX ADMIN — Medication 10 ML: at 21:14

## 2025-04-21 RX ADMIN — FUROSEMIDE 80 MG: 10 INJECTION, SOLUTION INTRAMUSCULAR; INTRAVENOUS at 13:03

## 2025-04-21 RX ADMIN — SENNOSIDES AND DOCUSATE SODIUM 2 TABLET: 50; 8.6 TABLET ORAL at 21:14

## 2025-04-21 RX ADMIN — Medication 10 ML: at 09:02

## 2025-04-21 RX ADMIN — HEPARIN SODIUM 5000 UNITS: 5000 INJECTION INTRAVENOUS; SUBCUTANEOUS at 09:02

## 2025-04-21 RX ADMIN — POLYETHYLENE GLYCOL 3350 17 G: 17 POWDER, FOR SOLUTION ORAL at 16:48

## 2025-04-21 RX ADMIN — ACETAMINOPHEN 650 MG: 325 TABLET ORAL at 09:02

## 2025-04-21 RX ADMIN — CEFEPIME 2000 MG: 2 INJECTION, POWDER, FOR SOLUTION INTRAVENOUS at 00:15

## 2025-04-21 RX ADMIN — BISACODYL 10 MG: 10 SUPPOSITORY RECTAL at 14:40

## 2025-04-21 RX ADMIN — CYCLOBENZAPRINE HYDROCHLORIDE 5 MG: 5 TABLET, FILM COATED ORAL at 20:57

## 2025-04-21 RX ADMIN — SODIUM BICARBONATE 1300 MG: 650 TABLET ORAL at 09:02

## 2025-04-21 RX ADMIN — HEPARIN SODIUM 5000 UNITS: 5000 INJECTION INTRAVENOUS; SUBCUTANEOUS at 21:03

## 2025-04-21 RX ADMIN — FLUTICASONE PROPIONATE 2 SPRAY: 50 SPRAY, METERED NASAL at 09:03

## 2025-04-21 RX ADMIN — INSULIN LISPRO 2 UNITS: 100 INJECTION, SOLUTION INTRAVENOUS; SUBCUTANEOUS at 16:48

## 2025-04-21 RX ADMIN — HYDROCODONE BITARTRATE AND ACETAMINOPHEN 1 TABLET: 5; 325 TABLET ORAL at 20:56

## 2025-04-21 RX ADMIN — GUAIFENESIN 600 MG: 600 TABLET ORAL at 20:56

## 2025-04-21 RX ADMIN — IPRATROPIUM BROMIDE AND ALBUTEROL SULFATE 3 ML: .5; 3 SOLUTION RESPIRATORY (INHALATION) at 07:34

## 2025-04-21 RX ADMIN — INSULIN GLARGINE 15 UNITS: 100 INJECTION, SOLUTION SUBCUTANEOUS at 21:03

## 2025-04-21 RX ADMIN — INSULIN LISPRO 4 UNITS: 100 INJECTION, SOLUTION INTRAVENOUS; SUBCUTANEOUS at 21:02

## 2025-04-21 RX ADMIN — MICONAZOLE NITRATE 1 APPLICATION: 2 POWDER TOPICAL at 09:03

## 2025-04-21 RX ADMIN — ONDANSETRON 4 MG: 2 INJECTION INTRAMUSCULAR; INTRAVENOUS at 15:06

## 2025-04-21 RX ADMIN — SODIUM BICARBONATE 1300 MG: 650 TABLET ORAL at 15:06

## 2025-04-21 RX ADMIN — FUROSEMIDE 80 MG: 10 INJECTION, SOLUTION INTRAMUSCULAR; INTRAVENOUS at 06:01

## 2025-04-21 NOTE — PLAN OF CARE
Goal Outcome Evaluation:              Outcome Evaluation: Patient on 2L n/c this shift. Diuresing with IV lasix. PRN bowel regimen administered. VSS.

## 2025-04-21 NOTE — PROGRESS NOTES
[facility]   Hospitalist Progress Note  Date: 2025  Patient Name: Desi Krishnan  : 1965  MRN: [id number]  Date of admission: 2025      Subjective   Subjective     Chief Complaint: Generalized weakness and shortness of breath    Summary: Desi Krishnan is a 60-year-old female with a past medical history of diabetes, hypertension, debility, CKD 4, COPD, morbid obesity and GERD who presented to the emergency department from nursing home for evaluation of generalized weakness and shortness of breath and hypoxia.  Over the past week patient has been experiencing shortness of breath with associated cough, body aches and decreased appetite.  Labs in the emergency department revealed significantly reduced renal function from baseline, hyperkalemia, uremia, hyperglycemia and elevated proBNP level with elevated but flat troponin.  Urinalysis positive for UTI.  Chest x-ray revealed cardiomegaly with infiltrate in the right lung base.  Nephrology consulted.  Patient admitted to hospitalist service for further management.    Interval Followup: Seen and examined patient this morning.  Spoke with son, Gilberto to update him about patient's hospital course.  Plan is for patient to undergo tunneled dialysis catheter placement.      Objective   Objective     Vitals:   Temp:  [98.1 °F (36.7 °C)-98.8 °F (37.1 °C)] 98.1 °F (36.7 °C)  Heart Rate:  [71-75] 71  Resp:  [18-20] 20  BP: ()/(47-70) 117/48  Flow (L/min) (Oxygen Therapy):  [2-3] 2    Physical Exam   GEN: No acute distress.  Obese  HEENT: Moist mucous membranes  LUNGS: Equal chest rise bilaterally.   CARDIAC: Regular rate and rhythm  NEURO: Moving all 4 extremities spontaneously  SKIN: Multiple wounds found in folds       Result Review    Result Review:  I have personally reviewed the results from the time of this admission to 2025 12:12 EDT and agree with these findings:  []  Laboratory  []  Microbiology  []  Radiology  []  EKG/Telemetry   []   Cardiology/Vascular   []  Pathology  []  Old records  []  Other:    Assessment & Plan   Assessment / Plan     Assessment:  Acute on chronic respiratory failure with hypoxia  Acute kidney injury superimposed on chronic kidney disease  Hyperkalemia--resolved  Metabolic acidosis  Iron deficiency anemia  Right lower lobe pulmonary infiltrate  UTI  Left ventricular hypertrophy  CHF (congestive heart failure), NYHA class II, unspecified failure chronicity, diastolic   Obesity, class III, BMI 40-49.9 morbid obesity        Plan:  Continue to monitor in the hospital with workup and management of the above  Appreciate recommendations from nephrology  Vascular surgery consulted for tunneled dialysis catheter placement  Hyperkalemia resolved  Imaging reviewed  Continue cefepime  Urine culture positive for E. coli  Continue aggressive diuresis with IV Lasix 80 mg every 8 hours  Continue with metolazone 5 mg daily  Continue IV iron  Avoid nephrotoxic agents  SSI  PT/OT  CBC, CMP reviewed  A.m. labs  Attempting to avoid dialysis as much as possible.  Will continue to monitor renal dysfunction.         Discussed plan with RN.    VTE Prophylaxis:  Pharmacologic VTE prophylaxis orders are present.        CODE STATUS:   Code Status (Patient has no pulse and is not breathing): CPR (Attempt to Resuscitate)  Medical Interventions (Patient has pulse or is breathing): Full Support    Electronically signed by Fadia Ball PA-C, 04/21/25, 12:57 PM EDT.       Patient independently seen and evaluated, agree with assessment and plan, above documentation reflects plan put forth during bedside rounds.  More than 51% of the time of this patient encounter was performed by me.    Interval history: No acute events overnight, patient resting comfortably in bed, creatinine improved but BUN still markedly elevated and patient still with uremic symptoms    GEN: No acute distress, morbidly obese  HEENT: Moist mucous membranes  LUNGS: Equal chest rise  bilaterally  CARDIAC: Regular rate and rhythm  NEURO: Moving all 4 extremities spontaneously  SKIN: No obvious breakdown    Plan:  Agree with assessment and plan as above  Continue antibiotics for UTI to completion  Continue diuretics, monitor renal function  Nephrology consulted, appreciate their recommendations, vascular surgery consulted for TDC placement  Avoid nephrotoxic agents  Metolazone dosing per nephrology  Patient progressing towards dialysis  Continue Guzmán catheter for now  Iron stores were replaced with IV iron over the past 48 hours  Monitor blood counts closely      Electronically signed by Shamir Flanagan MD, 4/21/2025, 12:12 EDT.

## 2025-04-21 NOTE — CONSULTS
[facility]   VASCULAR SURGERY CONSULT    Patient Name: Desi Krishnan  : 1965  MRN: [id number]  Primary Care Physician:  Alba Guzman MD  Date of admission: 2025    Subjective   Subjective     Chief Complaint: Need for long-term access for hemodialysis    HPI:    Desi Krishnan is a 60 y.o. female admitted with generalized weakness and shortness of breath.  Nephrology has been consulted and they have asked us to evaluate for a tunneled catheter for hemodialysis.  The patient indicates that there has been some discussion with her about dialysis but she is not aware that a definitive decision has been made.  She wants to wait until her son gets in and he makes a decision.    Review of Systems    Noncontributory except for the history of present illness.    Personal History     Past Medical History:   Diagnosis Date    Diabetes     History of anemia due to chronic kidney disease 2025    Hypertension        Past Surgical History:   Procedure Laterality Date    KNEE SURGERY      TUBAL ABDOMINAL LIGATION         Family History: family history includes Heart disease in her father. Otherwise pertinent FHx was reviewed and not pertinent to current issue.    Social History:  reports that she quit smoking about 11 years ago. Her smoking use included cigarettes. She started smoking about 27 years ago. She has a 7.7 pack-year smoking history. She has never used smokeless tobacco. Drug use questions deferred to the physician. She reports that she does not drink alcohol.    Home Medications:  BASAGLAR KWIKPEN, Glucagon Emergency, Glucose, HYDROcodone-acetaminophen, Insulin Aspart, acetaminophen, amLODIPine, atorvastatin, cyanocobalamin, cyclobenzaprine, dapagliflozin Propanediol, furosemide, gabapentin, levoFLOXacin, levothyroxine, loratadine, losartan, magnesium hydroxide, metFORMIN, naloxone, nystatin, sodium chloride, sodium zirconium cyclosilicate, sore muscle, and vitamin  D      Allergies:  Allergies   Allergen Reactions    Hydrochlorothiazide     Penicillin G Hives    Penicillins     Vancomycin        Objective   Objective     Vitals:   Temp:  [98.1 °F (36.7 °C)-98.8 °F (37.1 °C)] 98.1 °F (36.7 °C)  Heart Rate:  [71-75] 71  Resp:  [18-20] 20  BP: ()/(47-70) 117/48  Flow (L/min) (Oxygen Therapy):  [2-3] 2    Physical Exam    General: Awake, alert, NAD   Eyes:  KASSIE   Neck: Supple   Lungs: Clear   Heart: RRR   Abdomen: benign   Musculoskeletal:  normal motor tone, symmetric   Skin: warm, normal turgor   Neuro: strength 5/5 all extremities         Assessment & Plan   Assessment / Plan     Active Hospital Problems:  Active Hospital Problems    Diagnosis     **Acute on chronic respiratory failure with hypoxia     Acute kidney injury superimposed on chronic kidney disease     Right lower lobe pulmonary infiltrate     Acute UTI     Hyperkalemia     Metabolic acidosis     Acute on chronic diastolic (congestive) heart failure     History of anemia due to chronic kidney disease     Diabetic nephropathy     LVH (left ventricular hypertrophy)     CHF (congestive heart failure), NYHA class II, unspecified failure chronicity, diastolic     SOB (shortness of breath)     Obesity, Class III, BMI 40-49.9 (morbid obesity)        Assessment/plan:   Chronic renal insufficiency at this point has reached a level where dialysis is being considered.  I have been asked to evaluate for a tunneled catheter.  The patient has not yet decided that she wants to proceed, she wants her son to come in and for him to make a decision.  I have discussed with her briefly the mechanics, indications, risks as well as potential complications of abdominal catheter.  She wants to wait.        Electronically signed by Hao Toro MD, 04/21/25, 12:33 PM EDT.

## 2025-04-21 NOTE — SIGNIFICANT NOTE
04/21/25 1000   Physical Therapy Time and Intention   Comment, Session Not Performed pt non-ambulatory at baseline and declined further therapy sessions, will return to [facility]TC

## 2025-04-21 NOTE — PROGRESS NOTES
[facility]     Progress Note    Patient Name: Desi Krishnan  : 1965  MRN: [id number]  Primary Care Physician:  Alba Guzman MD  Date of admission: 2025    Subjective patient is fully awake alert responsive but appears little bit confused disoriented and her appetite is extremely poor.  Patient is also volume overloaded    Review of Systems  All review of systems are negative except as mentioned in subjective complaints.    Objective   Objective     Vitals:   Temp:  [98.1 °F (36.7 °C)-98.8 °F (37.1 °C)] 98.1 °F (36.7 °C)  Heart Rate:  [65-77] 74  Resp:  [18-20] 20  BP: ()/(47-76) 131/57  Flow (L/min) (Oxygen Therapy):  [2-3] 2  Physical Exam    Constitutional: Awake, alert responsive, conversant, no obvious distress              Psychiatric:  Appropriate affect, cooperative   Neurologic:  Awake alert ,oriented x 3, strength symmetric in all extremities, Cranial Nerves grossly intact to confrontation, speech clear   Eyes:   PERRLA, sclerae anicteric, no conjunctival injection   HEENT:  Moist mucous membranes, no nasal or eye discharge, no throat congestion   Neck:   Supple, no thyromegaly, no lymphadenopathy, trachea midline, no elevated JVD   Respiratory:  Clear to auscultation bilaterally, nonlabored respirations    Cardiovascular: RRR, no murmurs, rubs, or gallops, palpable pedal pulses bilaterally, No bilateral ankle edema   Gastrointestinal: Positive bowel sounds, soft, nontender, nondistended, no organomegaly   Musculoskeletal:  No clubbing or cyanosis to extremities,muscle wasting, joint swelling, muscle weakness             Skin:                      No rashes, bruising, skin ulcers, petechiae or ecchymosis    Result Review    Result Review:  I have personally reviewed the results from the time of this admission to 2025 08:37 EDT and agree with these findings:  []  Laboratory  []  Microbiology  []  Radiology  []  EKG/Telemetry   []  Cardiology/Vascular   []   Pathology  []  Old records  []  Other:    Results from last 7 days   Lab Units 04/21/25  0550 04/20/25  0512 04/19/25  0403 04/17/25  2139   WBC 10*3/mm3 5.88 6.43 6.26 9.76   HEMOGLOBIN g/dL 8.4* 8.5* 7.7* 9.1*   PLATELETS 10*3/mm3 218 218 177 201     Results from last 7 days   Lab Units 04/21/25  0550 04/20/25  0512 04/19/25  0403 04/18/25  0626 04/17/25  2139   SODIUM mmol/L 140 139 136 131* 134*   POTASSIUM mmol/L 4.1 4.5 4.9 6.2* 5.8*   CHLORIDE mmol/L 102 102 102 100 103   CO2 mmol/L 23.4 20.9* 19.5* 13.3* 16.5*   ANION GAP mmol/L 14.6 16.1* 14.5 17.7* 14.5   BUN mg/dL 102* 105* 96* 92* 78*   CREATININE mg/dL 5.12* 5.62* 5.89* 5.57* 5.43*   GLUCOSE mg/dL 136* 115* 104* 168* 217*   EGFR mL/min/1.73 9.2* 8.2* 7.8* 8.3* 8.6*   CALCIUM mg/dL 8.3* 8.3* 8.0* 8.2* 8.5*   MAGNESIUM mg/dL 2.8* 2.8* 2.8*  --  2.7*   ALBUMIN g/dL 2.7* 2.9* 2.5*  --  3.1*   BILIRUBIN mg/dL 0.2 0.2 0.2  --  0.2   ALK PHOS U/L 76 86 88  --  119*   ALT (SGPT) U/L 11 14 16  --  22   AST (SGOT) U/L 7 9 9  --  24       Scheduled Meds:cefepime, 2,000 mg, Intravenous, Q24H  ferric gluconate, 250 mg, Intravenous, Daily  fluticasone, 2 spray, Each Nare, Daily  furosemide, 80 mg, Intravenous, Q8H  guaiFENesin, 600 mg, Oral, Q12H  heparin (porcine), 5,000 Units, Subcutaneous, Q12H  insulin glargine, 15 Units, Subcutaneous, Nightly  insulin lispro, 2-9 Units, Subcutaneous, 4x Daily AC & at Bedtime  ipratropium-albuterol, 3 mL, Nebulization, Daily - RT  metOLazone, 5 mg, Oral, Daily  miconazole, 1 Application, Topical, 2 times per day  sodium bicarbonate, 1,300 mg, Oral, TID  sodium chloride, 10 mL, Intravenous, Q12H      Continuous Infusions:   PRN Meds:.  acetaminophen    senna-docusate sodium **AND** polyethylene glycol **AND** bisacodyl **AND** bisacodyl    cyclobenzaprine    dextrose    dextrose    dextrose    glucagon (human recombinant)    guaiFENesin-dextromethorphan    HYDROcodone-acetaminophen    ipratropium-albuterol    ondansetron    sodium  chloride    sodium chloride    sodium chloride    Assessment & Plan   Assessment / Plan       Active Hospital Problems:    Active Hospital Problems    Diagnosis  POA    **Acute on chronic respiratory failure with hypoxia [J96.21]  Yes    Acute kidney injury superimposed on chronic kidney disease [N17.9, N18.9]  Yes     12 g of proteinuria secondary diabetic nephropathy  Patient has progressive decline in renal function because of severe diabetic nephropathy      Right lower lobe pulmonary infiltrate [R91.8]  Unknown    Acute UTI [N39.0]  Unknown    Hyperkalemia [E87.5]  Unknown    Metabolic acidosis [E87.20]  Unknown    Acute on chronic diastolic (congestive) heart failure [I50.33]  Unknown    History of anemia due to chronic kidney disease [N18.9, Z86.2]  Not Applicable    Diabetic nephropathy [E11.21]  Unknown    LVH (left ventricular hypertrophy) [I51.7]  Yes    CHF (congestive heart failure), NYHA class II, unspecified failure chronicity, diastolic [I50.30]  Yes    SOB (shortness of breath) [R06.02]  Yes    Obesity, Class III, BMI 40-49.9 (morbid obesity) [E66.01]  Yes       Plan:   Patient most likely is going to need dialysis as she is exhibiting uremic symptoms confusion very poor appetite bad taste in her mouth  Consult vascular surgery for TDC       Electronically signed by Stella Chong MD, 04/21/25, 8:37 AM EDT.

## 2025-04-21 NOTE — PLAN OF CARE
Goal Outcome Evaluation:  Plan of Care Reviewed With: patient        Progress: improving  Outcome Evaluation: A/Ox4 with some forgetfulness, on 3L NC, home pain regimen restarted pre provider, IV lasix continued, IV abx continued, pt resting, no requests at this time

## 2025-04-22 LAB
ALBUMIN SERPL-MCNC: 2.9 G/DL (ref 3.5–5.2)
ALBUMIN/GLOB SERPL: 0.9 G/DL
ALP SERPL-CCNC: 70 U/L (ref 39–117)
ALT SERPL W P-5'-P-CCNC: 11 U/L (ref 1–33)
ANION GAP SERPL CALCULATED.3IONS-SCNC: 20.4 MMOL/L (ref 5–15)
AST SERPL-CCNC: 18 U/L (ref 1–32)
BASOPHILS # BLD AUTO: 0.02 10*3/MM3 (ref 0–0.2)
BASOPHILS NFR BLD AUTO: 0.3 % (ref 0–1.5)
BILIRUB SERPL-MCNC: <0.2 MG/DL (ref 0–1.2)
BUN SERPL-MCNC: 101 MG/DL (ref 6–20)
BUN/CREAT SERPL: 21.8 (ref 7–25)
BURR CELLS BLD QL SMEAR: NORMAL
CALCIUM SPEC-SCNC: 8.4 MG/DL (ref 8.6–10.5)
CHLORIDE SERPL-SCNC: 99 MMOL/L (ref 98–107)
CO2 SERPL-SCNC: 18.6 MMOL/L (ref 22–29)
CREAT SERPL-MCNC: 4.63 MG/DL (ref 0.57–1)
DEPRECATED RDW RBC AUTO: 53.1 FL (ref 37–54)
EGFRCR SERPLBLD CKD-EPI 2021: 10.4 ML/MIN/1.73
EOSINOPHIL # BLD AUTO: 0.18 10*3/MM3 (ref 0–0.4)
EOSINOPHIL NFR BLD AUTO: 2.5 % (ref 0.3–6.2)
ERYTHROCYTE [DISTWIDTH] IN BLOOD BY AUTOMATED COUNT: 15.6 % (ref 12.3–15.4)
GLOBULIN UR ELPH-MCNC: 3.1 GM/DL
GLUCOSE BLDC GLUCOMTR-MCNC: 131 MG/DL (ref 70–99)
GLUCOSE BLDC GLUCOMTR-MCNC: 137 MG/DL (ref 70–99)
GLUCOSE BLDC GLUCOMTR-MCNC: 139 MG/DL (ref 70–99)
GLUCOSE BLDC GLUCOMTR-MCNC: 163 MG/DL (ref 70–99)
GLUCOSE SERPL-MCNC: 138 MG/DL (ref 65–99)
HCT VFR BLD AUTO: 30 % (ref 34–46.6)
HGB BLD-MCNC: 9 G/DL (ref 12–15.9)
IMM GRANULOCYTES # BLD AUTO: 0.02 10*3/MM3 (ref 0–0.05)
IMM GRANULOCYTES NFR BLD AUTO: 0.3 % (ref 0–0.5)
LARGE PLATELETS: NORMAL
LYMPHOCYTES # BLD AUTO: 0.7 10*3/MM3 (ref 0.7–3.1)
LYMPHOCYTES NFR BLD AUTO: 9.7 % (ref 19.6–45.3)
MAGNESIUM SERPL-MCNC: 2.6 MG/DL (ref 1.6–2.6)
MCH RBC QN AUTO: 28.1 PG (ref 26.6–33)
MCHC RBC AUTO-ENTMCNC: 30 G/DL (ref 31.5–35.7)
MCV RBC AUTO: 93.8 FL (ref 79–97)
MONOCYTES # BLD AUTO: 0.7 10*3/MM3 (ref 0.1–0.9)
MONOCYTES NFR BLD AUTO: 9.7 % (ref 5–12)
NEUTROPHILS NFR BLD AUTO: 5.61 10*3/MM3 (ref 1.7–7)
NEUTROPHILS NFR BLD AUTO: 77.5 % (ref 42.7–76)
NRBC BLD AUTO-RTO: 0 /100 WBC (ref 0–0.2)
PHOSPHATE SERPL-MCNC: 7.6 MG/DL (ref 2.5–4.5)
PLATELET # BLD AUTO: 173 10*3/MM3 (ref 140–450)
PMV BLD AUTO: 11.2 FL (ref 6–12)
POTASSIUM SERPL-SCNC: 4.4 MMOL/L (ref 3.5–5.2)
PROT SERPL-MCNC: 6 G/DL (ref 6–8.5)
RBC # BLD AUTO: 3.2 10*6/MM3 (ref 3.77–5.28)
SODIUM SERPL-SCNC: 138 MMOL/L (ref 136–145)
WBC MORPH BLD: NORMAL
WBC NRBC COR # BLD AUTO: 7.23 10*3/MM3 (ref 3.4–10.8)

## 2025-04-22 PROCEDURE — 99232 SBSQ HOSP IP/OBS MODERATE 35: CPT | Performed by: INTERNAL MEDICINE

## 2025-04-22 PROCEDURE — 94799 UNLISTED PULMONARY SVC/PX: CPT

## 2025-04-22 PROCEDURE — 94664 DEMO&/EVAL PT USE INHALER: CPT

## 2025-04-22 PROCEDURE — 63710000001 INSULIN GLARGINE PER 5 UNITS: Performed by: FAMILY MEDICINE

## 2025-04-22 PROCEDURE — 80053 COMPREHEN METABOLIC PANEL: CPT

## 2025-04-22 PROCEDURE — 25010000002 HEPARIN (PORCINE) PER 1000 UNITS: Performed by: FAMILY MEDICINE

## 2025-04-22 PROCEDURE — 25810000003 SODIUM CHLORIDE 0.9 % SOLUTION: Performed by: STUDENT IN AN ORGANIZED HEALTH CARE EDUCATION/TRAINING PROGRAM

## 2025-04-22 PROCEDURE — 82948 REAGENT STRIP/BLOOD GLUCOSE: CPT

## 2025-04-22 PROCEDURE — 25010000002 FUROSEMIDE PER 20 MG: Performed by: STUDENT IN AN ORGANIZED HEALTH CARE EDUCATION/TRAINING PROGRAM

## 2025-04-22 PROCEDURE — 83735 ASSAY OF MAGNESIUM: CPT

## 2025-04-22 PROCEDURE — 85007 BL SMEAR W/DIFF WBC COUNT: CPT

## 2025-04-22 PROCEDURE — 85025 COMPLETE CBC W/AUTO DIFF WBC: CPT

## 2025-04-22 PROCEDURE — 84100 ASSAY OF PHOSPHORUS: CPT

## 2025-04-22 PROCEDURE — 25010000002 NA FERRIC GLUC CPLX PER 12.5 MG: Performed by: STUDENT IN AN ORGANIZED HEALTH CARE EDUCATION/TRAINING PROGRAM

## 2025-04-22 PROCEDURE — 25010000002 CEFEPIME PER 500 MG: Performed by: FAMILY MEDICINE

## 2025-04-22 RX ADMIN — GUAIFENESIN 600 MG: 600 TABLET ORAL at 20:40

## 2025-04-22 RX ADMIN — INSULIN GLARGINE 15 UNITS: 100 INJECTION, SOLUTION SUBCUTANEOUS at 20:46

## 2025-04-22 RX ADMIN — MICONAZOLE NITRATE 1 APPLICATION: 2 POWDER TOPICAL at 21:51

## 2025-04-22 RX ADMIN — HEPARIN SODIUM 5000 UNITS: 5000 INJECTION INTRAVENOUS; SUBCUTANEOUS at 08:52

## 2025-04-22 RX ADMIN — MICONAZOLE NITRATE 1 APPLICATION: 2 POWDER TOPICAL at 09:48

## 2025-04-22 RX ADMIN — CEFUROXIME AXETIL 500 MG: 250 TABLET ORAL at 09:49

## 2025-04-22 RX ADMIN — SODIUM CHLORIDE 250 MG: 9 INJECTION, SOLUTION INTRAVENOUS at 09:49

## 2025-04-22 RX ADMIN — IPRATROPIUM BROMIDE AND ALBUTEROL SULFATE 3 ML: .5; 3 SOLUTION RESPIRATORY (INHALATION) at 01:36

## 2025-04-22 RX ADMIN — Medication 10 ML: at 08:58

## 2025-04-22 RX ADMIN — SODIUM BICARBONATE 1300 MG: 650 TABLET ORAL at 20:40

## 2025-04-22 RX ADMIN — METOLAZONE 5 MG: 5 TABLET ORAL at 08:51

## 2025-04-22 RX ADMIN — IPRATROPIUM BROMIDE AND ALBUTEROL SULFATE 3 ML: .5; 3 SOLUTION RESPIRATORY (INHALATION) at 07:05

## 2025-04-22 RX ADMIN — SODIUM BICARBONATE 1300 MG: 650 TABLET ORAL at 15:09

## 2025-04-22 RX ADMIN — FUROSEMIDE 80 MG: 10 INJECTION, SOLUTION INTRAMUSCULAR; INTRAVENOUS at 05:13

## 2025-04-22 RX ADMIN — HEPARIN SODIUM 5000 UNITS: 5000 INJECTION INTRAVENOUS; SUBCUTANEOUS at 20:40

## 2025-04-22 RX ADMIN — FUROSEMIDE 80 MG: 10 INJECTION, SOLUTION INTRAMUSCULAR; INTRAVENOUS at 20:46

## 2025-04-22 RX ADMIN — FUROSEMIDE 80 MG: 10 INJECTION, SOLUTION INTRAMUSCULAR; INTRAVENOUS at 13:06

## 2025-04-22 RX ADMIN — SODIUM BICARBONATE 1300 MG: 650 TABLET ORAL at 08:53

## 2025-04-22 RX ADMIN — CEFEPIME 2000 MG: 2 INJECTION, POWDER, FOR SOLUTION INTRAVENOUS at 00:36

## 2025-04-22 RX ADMIN — HYDROCODONE BITARTRATE AND ACETAMINOPHEN 1 TABLET: 5; 325 TABLET ORAL at 20:40

## 2025-04-22 RX ADMIN — FLUTICASONE PROPIONATE 2 SPRAY: 50 SPRAY, METERED NASAL at 08:53

## 2025-04-22 RX ADMIN — Medication 10 ML: at 20:46

## 2025-04-22 RX ADMIN — GUAIFENESIN 600 MG: 600 TABLET ORAL at 08:51

## 2025-04-22 RX ADMIN — CYCLOBENZAPRINE HYDROCHLORIDE 5 MG: 5 TABLET, FILM COATED ORAL at 15:09

## 2025-04-22 RX ADMIN — HYDROCODONE BITARTRATE AND ACETAMINOPHEN 1 TABLET: 5; 325 TABLET ORAL at 08:51

## 2025-04-22 NOTE — PROGRESS NOTES
[facility]   Hospitalist Progress Note  Date: 2025  Patient Name: Desi Krishnan  : 1965  MRN: [id number]  Date of admission: 2025      Subjective   Subjective     Chief Complaint: Generalized weakness and shortness of breath    Summary: Desi Krishnan is a 60-year-old female with a past medical history of diabetes, hypertension, debility, CKD 4, COPD, morbid obesity and GERD who presented to the emergency department from nursing home for evaluation of generalized weakness and shortness of breath and hypoxia.  Over the past week patient has been experiencing shortness of breath with associated cough, body aches and decreased appetite.  Labs in the emergency department revealed significantly reduced renal function from baseline, hyperkalemia, uremia, hyperglycemia and elevated proBNP level with elevated but flat troponin.  Urinalysis positive for UTI.  Chest x-ray revealed cardiomegaly with infiltrate in the right lung base.  Nephrology consulted.  Patient admitted to hospitalist service for further management.  Patient responded well with IV diuretics.  However, patient still with uremic symptoms with minimal improvement of kidney function.  Nephrology recommending dialysis.  Patient and family wanting to hold off on dialysis for as long as possible.  Patient is currently a long-term resident at a facility where they do not provide dialysis.  If she is transported to her dialysis unit, based on her BMI she would likely not be excepted.  Social work following.    Interval Followup: Seen and examined patient this morning. Patient more lethargic this morning. She spoke with son and they would like to hold off on dialysis. Palliative consulted.     Objective   Objective     Vitals:   Temp:  [97.7 °F (36.5 °C)-98.4 °F (36.9 °C)] 97.7 °F (36.5 °C)  Heart Rate:  [71-87] 80  Resp:  [18-20] 18  BP: (116-147)/(48-77) 141/61  Flow (L/min) (Oxygen Therapy):  [2] 2    Physical Exam   GEN: No acute  distress.  Obese  HEENT: Moist mucous membranes  LUNGS: Equal chest rise bilaterally.   CARDIAC: Regular rate and rhythm  NEURO: Moving all 4 extremities spontaneously  SKIN: Multiple wounds found in folds       Result Review    Result Review:  I have personally reviewed the results from the time of this admission to 4/22/2025 09:18 EDT and agree with these findings:  []  Laboratory  []  Microbiology  []  Radiology  []  EKG/Telemetry   []  Cardiology/Vascular   []  Pathology  []  Old records  []  Other:    Assessment & Plan   Assessment / Plan     Assessment:  Acute on chronic respiratory failure with hypoxia  Acute kidney injury superimposed on chronic kidney disease  Hyperkalemia--resolved  Metabolic acidosis  Iron deficiency anemia  Right lower lobe pulmonary infiltrate  UTI  Left ventricular hypertrophy  CHF (congestive heart failure), NYHA class II, unspecified failure chronicity, diastolic   Obesity, class III, BMI 40-49.9 morbid obesity        Plan:  Continue to monitor in the hospital with workup and management of the above  Appreciate recommendations from nephrology  Vascular surgery consulted for tunneled dialysis catheter placement--Patient and family wishing to hold off on dialysis for now.   Palliative care consulted  Hyperkalemia resolved  Imaging reviewed  Cefepime de-escalated to cefuroxime  Urine culture positive for E. coli  Continue aggressive diuresis with IV Lasix 80 mg every 8 hours  Continue with metolazone 5 mg daily  Completed IV iron for REBECCA  Avoid nephrotoxic agents  SSI  PT/OT  CBC, CMP reviewed  A.m. labs  Attempting to avoid dialysis as much as possible.  Will continue to monitor renal dysfunction.  Discussed plan with RN.    VTE Prophylaxis:  Pharmacologic VTE prophylaxis orders are present.    CODE STATUS:   Code Status (Patient has no pulse and is not breathing): CPR (Attempt to Resuscitate)  Medical Interventions (Patient has pulse or is breathing): Full Support    Electronically  signed by Fadia Ball PA-C, 04/22/25, 9:22 AM EDT.     Patient independently seen and evaluated, agree with assessment and plan, above documentation reflects plan put forth during bedside rounds.  More than 51% of the time of this patient encounter was performed by me.     Interval history: No acute events overnight, patient sleepy this a.m., renal function remains poor although creatinine did improve, BUN has remained above 100, vascular surgery consulted yesterday and patient was unsure if she wanted to proceed with dialysis, she plans on discussing further with her son.     GEN: No acute distress, morbidly obese  HEENT: Moist mucous membranes  LUNGS: Equal chest rise bilaterally  CARDIAC: Regular rate and rhythm  NEURO: Moving all 4 extremities spontaneously  SKIN: No obvious breakdown     Plan:  Agree with assessment and plan as above  Continue antibiotics for UTI to completion  Continue diuretics, monitor renal function, some mild improvement in creatinine but waste products remain elevated  Nephrology consulted, appreciate their recommendations, vascular surgery consulted for TDC placement, patient unsure if she wants to proceed with TDC placement and dialysis, plans to discuss with son, will consult palliative care for further goals of care discussions  Avoid nephrotoxic agents  Metolazone dosing per nephrology  Continue Guzmán catheter for now  Iron stores replaced IV  Monitor blood counts closely      Electronically signed by Shamir Flanagan MD, 4/22/2025, 09:51 EDT.

## 2025-04-22 NOTE — SIGNIFICANT NOTE
04/22/25 1127   OTHER   Discipline occupational therapist   Rehab Time/Intention   Session Not Performed other (see comments)  (Patient nonambulatory at prior LTC facility for over 10 years; possible hospice awaiting palliative consult for today)

## 2025-04-22 NOTE — PLAN OF CARE
Goal Outcome Evaluation:  Plan of Care Reviewed With: patient        Progress: improving  Outcome Evaluation: A/Ox4 with some forgetfulness, on 2L NC, bowel regimen continued, 2 small BMs overnight, IV lasix and ABX continued, pt resting, no requests at this time

## 2025-04-22 NOTE — PROGRESS NOTES
[facility]     Progress Note    Patient Name: Desi Krishnan  : 1965  MRN: [id number]  Primary Care Physician:  Alba Guzman MD  Date of admission: 2025    Subjective   Patient with intermittent episodes of confusion  Patient has otherwise refused to have dialysis catheter placed  Renal dysfunction is still poor  Blood pressures and vitals are stable  Continues to respond well to diuresis    Scheduled Meds:cefepime, 2,000 mg, Intravenous, Q24H  cefuroxime, 500 mg, Oral, Q24H  ferric gluconate, 250 mg, Intravenous, Daily  fluticasone, 2 spray, Each Nare, Daily  furosemide, 80 mg, Intravenous, Q8H  guaiFENesin, 600 mg, Oral, Q12H  heparin (porcine), 5,000 Units, Subcutaneous, Q12H  insulin glargine, 15 Units, Subcutaneous, Nightly  insulin lispro, 2-9 Units, Subcutaneous, 4x Daily AC & at Bedtime  ipratropium-albuterol, 3 mL, Nebulization, Daily - RT  metOLazone, 5 mg, Oral, Daily  miconazole, 1 Application, Topical, 2 times per day  sodium bicarbonate, 1,300 mg, Oral, TID  sodium chloride, 10 mL, Intravenous, Q12H      Continuous Infusions:   PRN Meds:.  acetaminophen    senna-docusate sodium **AND** polyethylene glycol **AND** bisacodyl **AND** bisacodyl    cyclobenzaprine    dextrose    dextrose    dextrose    glucagon (human recombinant)    guaiFENesin-dextromethorphan    HYDROcodone-acetaminophen    ipratropium-albuterol    ondansetron    sodium chloride    sodium chloride    sodium chloride       Review of Systems  Constitutional:        Weakness tiredness fatigue  Eyes:                       No blurry vision, eye discharge, eye irritation, eye pain  HEENT:                   No acute hair loss, earache and discharge, nasal congestion or discharge, sore throat, postnasal drip  Respiratory:           No shortness of breath coughing sputum production wheezing hemoptysis pleuritic chest pain  Cardiovascular:     No chest pain, orthopnea, PND, dizziness, palpitation, lower extremity  edema  Gastrointestinal:   No nausea vomiting diarrhea abdominal pain constipation  Genitourinary:       No urinary incontinence, hesitancy, frequency, urgency, dysuria  Hematologic:         No bruising, bleeding, pallor, lymphadenopathy  Endocrine:            No coldness, hot flashes, polyuria, abnormal hair growth  Musculoskeletal:    No body pains, aches, arthritic pains, muscle pain ,muscle wasting  Psychiatric:          No low or high mood, anxiety, hallucinations, delusions  Skin.                      No rash, ulcers, bruising, itching  Neurological:        No confusion, headache, focal weakness, numbness, dysphasia    Objective   Objective     Vitals:   Temp:  [97.9 °F (36.6 °C)-98.4 °F (36.9 °C)] 98.4 °F (36.9 °C)  Heart Rate:  [71-87] 87  Resp:  [19-20] 20  BP: (116-147)/(48-77) 144/57  Flow (L/min) (Oxygen Therapy):  [2] 2  Physical Exam    Constitutional: Awake, alert responsive, conversant, no obvious distress              Psychiatric:  Appropriate affect, cooperative   Neurologic:  Awake alert ,oriented x 3, strength symmetric in all extremities, Cranial Nerves grossly intact to confrontation, speech clear   Eyes:   PERRLA, sclerae anicteric, no conjunctival injection   HEENT:  Moist mucous membranes, no nasal or eye discharge, no throat congestion   Neck:   Supple, no thyromegaly, no lymphadenopathy, trachea midline, no elevated JVD   Respiratory:  Clear to auscultation bilaterally, nonlabored respirations    Cardiovascular: RRR, no murmurs, rubs, or gallops, palpable pedal pulses bilaterally, No bilateral ankle edema   Gastrointestinal: Positive bowel sounds, soft, nontender, nondistended, no organomegaly   Musculoskeletal:  No clubbing or cyanosis to extremities,muscle wasting, joint swelling, muscle weakness             Skin:                      No rashes, bruising, skin ulcers, petechiae or ecchymosis    Result Review    Result Review:  I have personally reviewed the results from the time of this  admission to 4/22/2025 08:13 EDT and agree with these findings:  []  Laboratory  []  Microbiology  []  Radiology  []  EKG/Telemetry   []  Cardiology/Vascular   []  Pathology  []  Old records  []  Other:    Assessment & Plan   Assessment / Plan       Active Hospital Problems:  Active Hospital Problems    Diagnosis     **Acute on chronic respiratory failure with hypoxia     Acute kidney injury superimposed on chronic kidney disease     Right lower lobe pulmonary infiltrate     Acute UTI     Hyperkalemia     Metabolic acidosis     Acute on chronic diastolic (congestive) heart failure     History of anemia due to chronic kidney disease     Diabetic nephropathy     LVH (left ventricular hypertrophy)     CHF (congestive heart failure), NYHA class II, unspecified failure chronicity, diastolic     SOB (shortness of breath)     Obesity, Class III, BMI 40-49.9 (morbid obesity)      60-year-old female with past medical history of longstanding diabetes complicated with peripheral vascular disease status post amputation, hypertension, hyperlipidemia, hypothyroidism, morbid obesity, neuropathy secondary to diabetes, CKD stage IV with baseline creatinine of 2.5-2.9 with nephrotic range proteinuria who came in with worsening swelling and generalized weakness noted to have a rising creatinine to 5.3 and uptrending in the setting of diuresis    Plan:   Continue Lasix to 80 mg IV every 8  Continue metolazone to 5 mg  With patient's renal dysfunction and uremic symptoms, vascular surgery consulted however patient holding off  -Patient stays at a nursing home which does not have a dialysis unit.  If she is transported to a dialysis unit, based on her BMI, she would likely not be accepted to any dialysis unit.  Awaiting response from dialysis nursing.  Final resort, or option may be sending [facility] to [facility] where they have in-house dialysis and based on her BMI, will likely need daily dialysis but similar concerns exist there as well  -A  final option may also be consideration of right heart cath to assess volume status in this unique situation as patient already has poor dysfunction of her kidneys and may have developed some degree of ATN  -For that reason alone, will attempt to prolong and avoid dialysis as much as possible and will continue to monitor renal dysfunction with creatinine stable at this time and patient responding well to diuretics  Completed IV iron for iron deficiency anemia  Patient on antibiotics for possible pneumonia

## 2025-04-22 NOTE — PLAN OF CARE
Goal Outcome Evaluation:              Outcome Evaluation: Pt is alert and oriented with some forgetfulness.Has requested PRN Ciales once this shift. Alson complainted of left knee pain and flexeril was administered and helpful. VSS NSR on the cardiac monitor. Call light is within reach.

## 2025-04-23 LAB
ALBUMIN SERPL-MCNC: 2.9 G/DL (ref 3.5–5.2)
ALBUMIN/GLOB SERPL: 1 G/DL
ALP SERPL-CCNC: 63 U/L (ref 39–117)
ALT SERPL W P-5'-P-CCNC: 8 U/L (ref 1–33)
ANION GAP SERPL CALCULATED.3IONS-SCNC: 16.9 MMOL/L (ref 5–15)
AST SERPL-CCNC: 13 U/L (ref 1–32)
BASOPHILS # BLD AUTO: 0.04 10*3/MM3 (ref 0–0.2)
BASOPHILS NFR BLD AUTO: 0.6 % (ref 0–1.5)
BILIRUB SERPL-MCNC: 0.2 MG/DL (ref 0–1.2)
BUN SERPL-MCNC: 98 MG/DL (ref 6–20)
BUN/CREAT SERPL: 23.5 (ref 7–25)
CALCIUM SPEC-SCNC: 8.4 MG/DL (ref 8.6–10.5)
CHLORIDE SERPL-SCNC: 98 MMOL/L (ref 98–107)
CO2 SERPL-SCNC: 23.1 MMOL/L (ref 22–29)
CREAT SERPL-MCNC: 4.17 MG/DL (ref 0.57–1)
DEPRECATED RDW RBC AUTO: 54.2 FL (ref 37–54)
EGFRCR SERPLBLD CKD-EPI 2021: 11.8 ML/MIN/1.73
EOSINOPHIL # BLD AUTO: 0.3 10*3/MM3 (ref 0–0.4)
EOSINOPHIL NFR BLD AUTO: 4.6 % (ref 0.3–6.2)
ERYTHROCYTE [DISTWIDTH] IN BLOOD BY AUTOMATED COUNT: 15.9 % (ref 12.3–15.4)
GLOBULIN UR ELPH-MCNC: 2.9 GM/DL
GLUCOSE BLDC GLUCOMTR-MCNC: 104 MG/DL (ref 70–99)
GLUCOSE BLDC GLUCOMTR-MCNC: 112 MG/DL (ref 70–99)
GLUCOSE BLDC GLUCOMTR-MCNC: 121 MG/DL (ref 70–99)
GLUCOSE BLDC GLUCOMTR-MCNC: 166 MG/DL (ref 70–99)
GLUCOSE SERPL-MCNC: 111 MG/DL (ref 65–99)
HCT VFR BLD AUTO: 29.6 % (ref 34–46.6)
HGB BLD-MCNC: 8.6 G/DL (ref 12–15.9)
IMM GRANULOCYTES # BLD AUTO: 0.02 10*3/MM3 (ref 0–0.05)
IMM GRANULOCYTES NFR BLD AUTO: 0.3 % (ref 0–0.5)
LYMPHOCYTES # BLD AUTO: 0.75 10*3/MM3 (ref 0.7–3.1)
LYMPHOCYTES NFR BLD AUTO: 11.4 % (ref 19.6–45.3)
MAGNESIUM SERPL-MCNC: 2.5 MG/DL (ref 1.6–2.6)
MCH RBC QN AUTO: 27.3 PG (ref 26.6–33)
MCHC RBC AUTO-ENTMCNC: 29.1 G/DL (ref 31.5–35.7)
MCV RBC AUTO: 94 FL (ref 79–97)
MONOCYTES # BLD AUTO: 0.59 10*3/MM3 (ref 0.1–0.9)
MONOCYTES NFR BLD AUTO: 9 % (ref 5–12)
NEUTROPHILS NFR BLD AUTO: 4.86 10*3/MM3 (ref 1.7–7)
NEUTROPHILS NFR BLD AUTO: 74.1 % (ref 42.7–76)
NRBC BLD AUTO-RTO: 0 /100 WBC (ref 0–0.2)
PHOSPHATE SERPL-MCNC: 7.5 MG/DL (ref 2.5–4.5)
PLATELET # BLD AUTO: 203 10*3/MM3 (ref 140–450)
PMV BLD AUTO: 10 FL (ref 6–12)
POTASSIUM SERPL-SCNC: 4.1 MMOL/L (ref 3.5–5.2)
PROT SERPL-MCNC: 5.8 G/DL (ref 6–8.5)
QT INTERVAL: 395 MS
QTC INTERVAL: 457 MS
RBC # BLD AUTO: 3.15 10*6/MM3 (ref 3.77–5.28)
SODIUM SERPL-SCNC: 138 MMOL/L (ref 136–145)
TSH SERPL DL<=0.05 MIU/L-ACNC: 6.99 UIU/ML (ref 0.27–4.2)
WBC NRBC COR # BLD AUTO: 6.56 10*3/MM3 (ref 3.4–10.8)

## 2025-04-23 PROCEDURE — 63710000001 INSULIN LISPRO (HUMAN) PER 5 UNITS: Performed by: FAMILY MEDICINE

## 2025-04-23 PROCEDURE — 80053 COMPREHEN METABOLIC PANEL: CPT

## 2025-04-23 PROCEDURE — 85025 COMPLETE CBC W/AUTO DIFF WBC: CPT

## 2025-04-23 PROCEDURE — 99232 SBSQ HOSP IP/OBS MODERATE 35: CPT | Performed by: INTERNAL MEDICINE

## 2025-04-23 PROCEDURE — 94799 UNLISTED PULMONARY SVC/PX: CPT

## 2025-04-23 PROCEDURE — 93005 ELECTROCARDIOGRAM TRACING: CPT | Performed by: INTERNAL MEDICINE

## 2025-04-23 PROCEDURE — 25010000002 FUROSEMIDE PER 20 MG: Performed by: STUDENT IN AN ORGANIZED HEALTH CARE EDUCATION/TRAINING PROGRAM

## 2025-04-23 PROCEDURE — 94664 DEMO&/EVAL PT USE INHALER: CPT

## 2025-04-23 PROCEDURE — 84443 ASSAY THYROID STIM HORMONE: CPT | Performed by: INTERNAL MEDICINE

## 2025-04-23 PROCEDURE — 83735 ASSAY OF MAGNESIUM: CPT

## 2025-04-23 PROCEDURE — 82948 REAGENT STRIP/BLOOD GLUCOSE: CPT

## 2025-04-23 PROCEDURE — 25010000002 HEPARIN (PORCINE) PER 1000 UNITS: Performed by: FAMILY MEDICINE

## 2025-04-23 PROCEDURE — 84100 ASSAY OF PHOSPHORUS: CPT

## 2025-04-23 PROCEDURE — 63710000001 INSULIN GLARGINE PER 5 UNITS: Performed by: FAMILY MEDICINE

## 2025-04-23 RX ADMIN — FUROSEMIDE 80 MG: 10 INJECTION, SOLUTION INTRAMUSCULAR; INTRAVENOUS at 22:57

## 2025-04-23 RX ADMIN — SODIUM BICARBONATE 1300 MG: 650 TABLET ORAL at 16:02

## 2025-04-23 RX ADMIN — HEPARIN SODIUM 5000 UNITS: 5000 INJECTION INTRAVENOUS; SUBCUTANEOUS at 09:41

## 2025-04-23 RX ADMIN — SODIUM BICARBONATE 1300 MG: 650 TABLET ORAL at 09:41

## 2025-04-23 RX ADMIN — MICONAZOLE NITRATE 1 APPLICATION: 2 POWDER TOPICAL at 22:57

## 2025-04-23 RX ADMIN — IPRATROPIUM BROMIDE AND ALBUTEROL SULFATE 3 ML: .5; 3 SOLUTION RESPIRATORY (INHALATION) at 07:07

## 2025-04-23 RX ADMIN — INSULIN GLARGINE 15 UNITS: 100 INJECTION, SOLUTION SUBCUTANEOUS at 22:56

## 2025-04-23 RX ADMIN — Medication 10 ML: at 22:57

## 2025-04-23 RX ADMIN — FUROSEMIDE 80 MG: 10 INJECTION, SOLUTION INTRAMUSCULAR; INTRAVENOUS at 14:06

## 2025-04-23 RX ADMIN — HYDROCODONE BITARTRATE AND ACETAMINOPHEN 1 TABLET: 5; 325 TABLET ORAL at 09:49

## 2025-04-23 RX ADMIN — INSULIN LISPRO 2 UNITS: 100 INJECTION, SOLUTION INTRAVENOUS; SUBCUTANEOUS at 22:56

## 2025-04-23 RX ADMIN — ACETAMINOPHEN 650 MG: 325 TABLET ORAL at 05:42

## 2025-04-23 RX ADMIN — GUAIFENESIN 600 MG: 600 TABLET ORAL at 22:56

## 2025-04-23 RX ADMIN — HYDROCODONE BITARTRATE AND ACETAMINOPHEN 1 TABLET: 5; 325 TABLET ORAL at 20:21

## 2025-04-23 RX ADMIN — GUAIFENESIN 600 MG: 600 TABLET ORAL at 09:41

## 2025-04-23 RX ADMIN — FLUTICASONE PROPIONATE 2 SPRAY: 50 SPRAY, METERED NASAL at 09:42

## 2025-04-23 RX ADMIN — CEFUROXIME AXETIL 500 MG: 250 TABLET ORAL at 09:41

## 2025-04-23 RX ADMIN — METOLAZONE 5 MG: 5 TABLET ORAL at 09:41

## 2025-04-23 RX ADMIN — Medication 10 ML: at 09:41

## 2025-04-23 RX ADMIN — CYCLOBENZAPRINE HYDROCHLORIDE 5 MG: 5 TABLET, FILM COATED ORAL at 22:58

## 2025-04-23 RX ADMIN — SODIUM BICARBONATE 1300 MG: 650 TABLET ORAL at 22:56

## 2025-04-23 RX ADMIN — MICONAZOLE NITRATE 1 APPLICATION: 2 POWDER TOPICAL at 09:42

## 2025-04-23 RX ADMIN — CYCLOBENZAPRINE HYDROCHLORIDE 5 MG: 5 TABLET, FILM COATED ORAL at 05:43

## 2025-04-23 RX ADMIN — HEPARIN SODIUM 5000 UNITS: 5000 INJECTION INTRAVENOUS; SUBCUTANEOUS at 22:57

## 2025-04-23 RX ADMIN — FUROSEMIDE 80 MG: 10 INJECTION, SOLUTION INTRAMUSCULAR; INTRAVENOUS at 05:14

## 2025-04-23 NOTE — CONSULTS
Purpose of the visit was to evaluate for: goals of care/advanced care planning and support for patient/family. Spoke with RN and patient and discussed goals of care, clarify code status, and determination of decision maker.      Assessment:  RN, CCM, Palliative Care met with patient to introduce self and assess Palliative Care needs.  Medical history of diabetes, hypertension, debility, CKD 4, COPD, morbid obesity and GERD.  Reports long term care at [place] for approximately 10 years.  Discussed code status with chest compressions and intubation.  Patient verbalized understanding and request to remain a full code.  Patient completed a Living Will to make son HCS.  Living Will copies sent to [facility] and placed in physical chart with original given to patient.  Provided emotional support.  Patient plans return to [facility] with no further needs from Palliative Care.  If new needs arise, please place a new Palliative Care consult.      Tasks Completed: Livingwill, Code Status clarification, and Emotional Support.    Asha RAMIREZ RN, [facility]  Palliative Care

## 2025-04-23 NOTE — PLAN OF CARE
Goal Outcome Evaluation:  Plan of Care Reviewed With: patient        Progress: improving  Outcome Evaluation: A/Ox4 with some forgetfulness, on 2L NC, pain managed with PRN pain meds, IV lasix continued, pt resting, no requests at this time

## 2025-04-23 NOTE — SIGNIFICANT NOTE
04/23/25 0945   Coping/Psychosocial   Observed Emotional State calm;cooperative   Verbalized Emotional State other (see comments)  (Th Pt say she is tired)   Trust Relationship/Rapport empathic listening provided   Involvement in Care interacting with patient   Additional Documentation Spiritual Care (Group)   Spiritual Care   Spiritual Care Visit Type initial   Spiritual Care Source palliative care   Receptivity to Spiritual Care visit welcomed   Spiritual Care Interventions supportive conversation provided;prayer support provided   Response to Spiritual Care receptive of support;thanks expressed   Use of Spiritual Resources prayer   Spiritual Care Follow-Up follow-up, none required as presently assessed     Length of visit: 15 min

## 2025-04-23 NOTE — PROGRESS NOTES
[facility]     Progress Note    Patient Name: Desi Krishnan  : 1965  MRN: [id number]  Primary Care Physician:  Alba Guzman MD  Date of admission: 2025    Subjective   Patient's creatinine continued to improve slowly steadily  Son who is the primary decision-maker has refused the patient to have dialysis  Patient is responding well to diuresis  Blood pressures and vitals are stable  No major acute events overnight    Scheduled Meds:cefuroxime, 500 mg, Oral, Q24H  fluticasone, 2 spray, Each Nare, Daily  furosemide, 80 mg, Intravenous, Q8H  guaiFENesin, 600 mg, Oral, Q12H  heparin (porcine), 5,000 Units, Subcutaneous, Q12H  insulin glargine, 15 Units, Subcutaneous, Nightly  insulin lispro, 2-9 Units, Subcutaneous, 4x Daily AC & at Bedtime  ipratropium-albuterol, 3 mL, Nebulization, Daily - RT  metOLazone, 5 mg, Oral, Daily  miconazole, 1 Application, Topical, 2 times per day  sodium bicarbonate, 1,300 mg, Oral, TID  sodium chloride, 10 mL, Intravenous, Q12H      Continuous Infusions:   PRN Meds:.  acetaminophen    senna-docusate sodium **AND** polyethylene glycol **AND** bisacodyl **AND** bisacodyl    cyclobenzaprine    dextrose    dextrose    dextrose    glucagon (human recombinant)    guaiFENesin-dextromethorphan    HYDROcodone-acetaminophen    ipratropium-albuterol    ondansetron    sodium chloride    sodium chloride    sodium chloride       Review of Systems  Constitutional:        Weakness tiredness fatigue  Eyes:                       No blurry vision, eye discharge, eye irritation, eye pain  HEENT:                   No acute hair loss, earache and discharge, nasal congestion or discharge, sore throat, postnasal drip  Respiratory:           No shortness of breath coughing sputum production wheezing hemoptysis pleuritic chest pain  Cardiovascular:     No chest pain, orthopnea, PND, dizziness, palpitation, lower extremity edema  Gastrointestinal:   No nausea vomiting diarrhea  abdominal pain constipation  Genitourinary:       No urinary incontinence, hesitancy, frequency, urgency, dysuria  Hematologic:         No bruising, bleeding, pallor, lymphadenopathy  Endocrine:            No coldness, hot flashes, polyuria, abnormal hair growth  Musculoskeletal:    No body pains, aches, arthritic pains, muscle pain ,muscle wasting  Psychiatric:          No low or high mood, anxiety, hallucinations, delusions  Skin.                      No rash, ulcers, bruising, itching  Neurological:        No confusion, headache, focal weakness, numbness, dysphasia    Objective   Objective     Vitals:   Temp:  [97.9 °F (36.6 °C)-99.5 °F (37.5 °C)] 98.4 °F (36.9 °C)  Heart Rate:  [68-79] 79  Resp:  [18-22] 18  BP: (112-145)/(44-63) 145/46  Flow (L/min) (Oxygen Therapy):  [2] 2  Physical Exam    Constitutional: Awake, alert responsive, conversant, no obvious distress              Psychiatric:  Appropriate affect, cooperative   Neurologic:  Awake alert ,oriented x 3, strength symmetric in all extremities, Cranial Nerves grossly intact to confrontation, speech clear   Eyes:   PERRLA, sclerae anicteric, no conjunctival injection   HEENT:  Moist mucous membranes, no nasal or eye discharge, no throat congestion   Neck:   Supple, no thyromegaly, no lymphadenopathy, trachea midline, no elevated JVD   Respiratory:  Clear to auscultation bilaterally, nonlabored respirations    Cardiovascular: RRR, no murmurs, rubs, or gallops, palpable pedal pulses bilaterally, No bilateral ankle edema   Gastrointestinal: Positive bowel sounds, soft, nontender, nondistended, no organomegaly   Musculoskeletal:  No clubbing or cyanosis to extremities,muscle wasting, joint swelling, muscle weakness             Skin:                      No rashes, bruising, skin ulcers, petechiae or ecchymosis    Result Review    Result Review:  I have personally reviewed the results from the time of this admission to 4/23/2025 07:57 EDT and agree with these  findings:  []  Laboratory  []  Microbiology  []  Radiology  []  EKG/Telemetry   []  Cardiology/Vascular   []  Pathology  []  Old records  []  Other:    Assessment & Plan   Assessment / Plan       Active Hospital Problems:  Active Hospital Problems    Diagnosis     **Acute on chronic respiratory failure with hypoxia     Acute kidney injury superimposed on chronic kidney disease     Right lower lobe pulmonary infiltrate     Acute UTI     Hyperkalemia     Metabolic acidosis     Acute on chronic diastolic (congestive) heart failure     History of anemia due to chronic kidney disease     Diabetic nephropathy     LVH (left ventricular hypertrophy)     CHF (congestive heart failure), NYHA class II, unspecified failure chronicity, diastolic     SOB (shortness of breath)     Obesity, Class III, BMI 40-49.9 (morbid obesity)      60-year-old female with past medical history of longstanding diabetes complicated with peripheral vascular disease status post amputation, hypertension, hyperlipidemia, hypothyroidism, morbid obesity, neuropathy secondary to diabetes, CKD stage IV with baseline creatinine of 2.5-2.9 with nephrotic range proteinuria who came in with worsening swelling and generalized weakness noted to have a rising creatinine to 5.3 and uptrending in the setting of diuresis.  Over the last 48 hours creatinine has started to trend down    Plan:   Continue Lasix to 80 mg IV every 8  Continue metolazone to 5 mg  - Patient's creatinine is improving and we will attempt to hold off on dialysis as long as possible considering her comorbidities and thankfully mild improvement in renal dysfunction  -In any case should dialysis be needed, it would be possible based on her weight to have it done however it appears on does not wish for her to have dialysis and she has deferred all of her decision making to him.  Palliative care has been consulted  Completed IV iron for iron deficiency anemia  Patient on antibiotics for possible  pneumonia

## 2025-04-23 NOTE — PROGRESS NOTES
Psychiatric   Hospitalist Progress Note  Date: 2025  Patient Name: Desi Krishnan  : 1965  MRN: [id number]  Date of admission: 2025      Subjective   Subjective     Chief Complaint: Generalized weakness and shortness of breath    Summary: Desi Krishnan is a 60-year-old female with a past medical history of diabetes, hypertension, debility, CKD 4, COPD, morbid obesity and GERD who presented to the emergency department from nursing home for evaluation of generalized weakness and shortness of breath and hypoxia.  Over the past week patient has been experiencing shortness of breath with associated cough, body aches and decreased appetite.  Labs in the emergency department revealed significantly reduced renal function from baseline, hyperkalemia, uremia, hyperglycemia and elevated proBNP level with elevated but flat troponin.  Urinalysis positive for UTI.  Chest x-ray revealed cardiomegaly with infiltrate in the right lung base.  Nephrology consulted.  Patient admitted to hospitalist service for further management.  Patient responded well with IV diuretics.  However, patient still with uremic symptoms with minimal improvement of kidney function.  Nephrology recommending dialysis.  Patient and family wanting to hold off on dialysis for as long as possible.  Patient is currently a long-term resident at a facility where they do not provide dialysis.  If she is transported to her dialysis unit, based on her BMI she would likely not be accepted.  Social work following.  Patient's son has decided not to pursue dialysis, nephrology attempting to optimize volume status and hold off on dialysis as long as possible.  Patient's creatinine is improving slowly, baseline creatinine noted to be around 2.5-2.9    Interval Followup: No acute events overnight, patient remains drowsy, Guzmán catheter remains in place    Objective   Objective     Vitals:   Temp:  [97.9 °F (36.6 °C)-99.5 °F (37.5 °C)] 98.4 °F (36.9  °C)  Heart Rate:  [68-79] 79  Resp:  [18-22] 18  BP: (112-145)/(44-63) 145/46  Flow (L/min) (Oxygen Therapy):  [2] 2    Physical Exam   GEN: No acute distress.  Obese  HEENT: Moist mucous membranes  LUNGS: Equal chest rise bilaterally.   CARDIAC: Regular rate and rhythm  NEURO: Moving all 4 extremities spontaneously  SKIN: Multiple wounds found in folds       Result Review    Result Review:  I have personally reviewed the results from the time of this admission to 4/23/2025 09:31 EDT and agree with these findings:  []  Laboratory  []  Microbiology  []  Radiology  []  EKG/Telemetry   []  Cardiology/Vascular   []  Pathology  []  Old records  []  Other:    Assessment & Plan   Assessment / Plan     Assessment:  Acute on chronic respiratory failure with hypoxia  Acute kidney injury superimposed on chronic kidney disease  Hyperkalemia--resolved  Metabolic acidosis  Iron deficiency anemia  Right lower lobe pulmonary infiltrate  UTI  Left ventricular hypertrophy  CHF (congestive heart failure), NYHA class II, unspecified failure chronicity, diastolic   Obesity, class III, BMI 40-49.9 morbid obesity  Abnormal heart rhythm, concern for A-fib    Plan:  Continue to monitor in the hospital with workup and management of the above  Appreciate recommendations from nephrology  Vascular surgery consulted for tunneled dialysis catheter placement--Patient and family wishing to hold off on dialysis for now.   Palliative care consult for goals of care discussion  Hyperkalemia resolved, continue to monitor closely  Imaging reviewed  Cefepime de-escalated to cefuroxime for UTI  Urine culture positive for E. coli  Continue aggressive diuresis with IV Lasix 80 mg every 8 hours, nephrology managing  Metolazone per nephrology  Completed IV iron for REBECCA, monitor hemoglobin closely  Avoid nephrotoxic agents  Continue Guzmán catheter  Irregular rhythm noted on telemetry, EKG pending to assess for underlying A-fib, currently rate  controlled  Patient had echocardiogram on 4/3/2025 with hyperdynamic EF greater than 70%, mild to moderate concentric hypertrophy  Check TSH today  SSI  PT/OT  CBC, CMP reviewed  A.m. labs  Attempting to avoid dialysis as much as possible.  Will continue to monitor renal dysfunction.  Discussed plan with RN.    VTE Prophylaxis:  Pharmacologic VTE prophylaxis orders are present.    CODE STATUS:   Code Status (Patient has no pulse and is not breathing): CPR (Attempt to Resuscitate)  Medical Interventions (Patient has pulse or is breathing): Full Support    Disposition: Discharge back to rehab facility when cleared for discharge by nephrology, likely 72+ hours away from this as patient still has significant renal dysfunction    Electronically signed by Shamir Flanagan MD, 4/23/2025, 09:32 EDT.

## 2025-04-23 NOTE — PROGRESS NOTES
Enter Query Response Below      Query Response: Acute on chronic respiratory failure was not supported              If applicable, please update the problem list.

## 2025-04-23 NOTE — PROGRESS NOTES
Enter Query Response Below      Query Response: Bacterial pneumonia unspecified              If applicable, please update the problem list.

## 2025-04-23 NOTE — PLAN OF CARE
Goal Outcome Evaluation:              Outcome Evaluation: Alert and oriented, forgetful at times. In bariatric bed resting comfortlably PRN pain meds administered as ordered. Afib this AM confirmed with EKG. MD notified. VSS Call light is within reach.

## 2025-04-24 LAB
ALBUMIN SERPL-MCNC: 3.1 G/DL (ref 3.5–5.2)
ALBUMIN/GLOB SERPL: 1.1 G/DL
ALP SERPL-CCNC: 64 U/L (ref 39–117)
ALT SERPL W P-5'-P-CCNC: 12 U/L (ref 1–33)
ANION GAP SERPL CALCULATED.3IONS-SCNC: 13.5 MMOL/L (ref 5–15)
AST SERPL-CCNC: 15 U/L (ref 1–32)
BASOPHILS # BLD AUTO: 0.03 10*3/MM3 (ref 0–0.2)
BASOPHILS NFR BLD AUTO: 0.5 % (ref 0–1.5)
BILIRUB SERPL-MCNC: 0.2 MG/DL (ref 0–1.2)
BUN SERPL-MCNC: 94 MG/DL (ref 6–20)
BUN/CREAT SERPL: 24.4 (ref 7–25)
CALCIUM SPEC-SCNC: 8.6 MG/DL (ref 8.6–10.5)
CHLORIDE SERPL-SCNC: 97 MMOL/L (ref 98–107)
CO2 SERPL-SCNC: 30.5 MMOL/L (ref 22–29)
CREAT SERPL-MCNC: 3.85 MG/DL (ref 0.57–1)
DEPRECATED RDW RBC AUTO: 50.4 FL (ref 37–54)
EGFRCR SERPLBLD CKD-EPI 2021: 13 ML/MIN/1.73
EOSINOPHIL # BLD AUTO: 0.32 10*3/MM3 (ref 0–0.4)
EOSINOPHIL NFR BLD AUTO: 4.8 % (ref 0.3–6.2)
ERYTHROCYTE [DISTWIDTH] IN BLOOD BY AUTOMATED COUNT: 15.4 % (ref 12.3–15.4)
GLOBULIN UR ELPH-MCNC: 2.8 GM/DL
GLUCOSE BLDC GLUCOMTR-MCNC: 104 MG/DL (ref 70–99)
GLUCOSE BLDC GLUCOMTR-MCNC: 122 MG/DL (ref 70–99)
GLUCOSE BLDC GLUCOMTR-MCNC: 157 MG/DL (ref 70–99)
GLUCOSE BLDC GLUCOMTR-MCNC: 183 MG/DL (ref 70–99)
GLUCOSE SERPL-MCNC: 101 MG/DL (ref 65–99)
HCT VFR BLD AUTO: 29.7 % (ref 34–46.6)
HGB BLD-MCNC: 9 G/DL (ref 12–15.9)
IMM GRANULOCYTES # BLD AUTO: 0.02 10*3/MM3 (ref 0–0.05)
IMM GRANULOCYTES NFR BLD AUTO: 0.3 % (ref 0–0.5)
LYMPHOCYTES # BLD AUTO: 0.75 10*3/MM3 (ref 0.7–3.1)
LYMPHOCYTES NFR BLD AUTO: 11.3 % (ref 19.6–45.3)
MAGNESIUM SERPL-MCNC: 2.4 MG/DL (ref 1.6–2.6)
MCH RBC QN AUTO: 27.3 PG (ref 26.6–33)
MCHC RBC AUTO-ENTMCNC: 30.3 G/DL (ref 31.5–35.7)
MCV RBC AUTO: 90 FL (ref 79–97)
MONOCYTES # BLD AUTO: 0.61 10*3/MM3 (ref 0.1–0.9)
MONOCYTES NFR BLD AUTO: 9.2 % (ref 5–12)
NEUTROPHILS NFR BLD AUTO: 4.89 10*3/MM3 (ref 1.7–7)
NEUTROPHILS NFR BLD AUTO: 73.9 % (ref 42.7–76)
NRBC BLD AUTO-RTO: 0 /100 WBC (ref 0–0.2)
PHOSPHATE SERPL-MCNC: 6.5 MG/DL (ref 2.5–4.5)
PLATELET # BLD AUTO: 207 10*3/MM3 (ref 140–450)
PMV BLD AUTO: 10 FL (ref 6–12)
POTASSIUM SERPL-SCNC: 3.3 MMOL/L (ref 3.5–5.2)
PROT SERPL-MCNC: 5.9 G/DL (ref 6–8.5)
RBC # BLD AUTO: 3.3 10*6/MM3 (ref 3.77–5.28)
SODIUM SERPL-SCNC: 141 MMOL/L (ref 136–145)
WBC NRBC COR # BLD AUTO: 6.62 10*3/MM3 (ref 3.4–10.8)

## 2025-04-24 PROCEDURE — 99232 SBSQ HOSP IP/OBS MODERATE 35: CPT | Performed by: INTERNAL MEDICINE

## 2025-04-24 PROCEDURE — 25010000002 HEPARIN (PORCINE) PER 1000 UNITS: Performed by: FAMILY MEDICINE

## 2025-04-24 PROCEDURE — 94799 UNLISTED PULMONARY SVC/PX: CPT

## 2025-04-24 PROCEDURE — 82948 REAGENT STRIP/BLOOD GLUCOSE: CPT

## 2025-04-24 PROCEDURE — 63710000001 INSULIN LISPRO (HUMAN) PER 5 UNITS: Performed by: FAMILY MEDICINE

## 2025-04-24 PROCEDURE — 63710000001 INSULIN GLARGINE PER 5 UNITS: Performed by: FAMILY MEDICINE

## 2025-04-24 PROCEDURE — 84100 ASSAY OF PHOSPHORUS: CPT | Performed by: INTERNAL MEDICINE

## 2025-04-24 PROCEDURE — 80053 COMPREHEN METABOLIC PANEL: CPT | Performed by: INTERNAL MEDICINE

## 2025-04-24 PROCEDURE — 82948 REAGENT STRIP/BLOOD GLUCOSE: CPT | Performed by: FAMILY MEDICINE

## 2025-04-24 PROCEDURE — 94664 DEMO&/EVAL PT USE INHALER: CPT

## 2025-04-24 PROCEDURE — 85025 COMPLETE CBC W/AUTO DIFF WBC: CPT | Performed by: INTERNAL MEDICINE

## 2025-04-24 PROCEDURE — 83735 ASSAY OF MAGNESIUM: CPT | Performed by: INTERNAL MEDICINE

## 2025-04-24 PROCEDURE — 25010000002 FUROSEMIDE PER 20 MG: Performed by: STUDENT IN AN ORGANIZED HEALTH CARE EDUCATION/TRAINING PROGRAM

## 2025-04-24 RX ORDER — TORSEMIDE 20 MG/1
40 TABLET ORAL DAILY
Status: DISCONTINUED | OUTPATIENT
Start: 2025-04-24 | End: 2025-04-25 | Stop reason: HOSPADM

## 2025-04-24 RX ADMIN — FLUTICASONE PROPIONATE 2 SPRAY: 50 SPRAY, METERED NASAL at 09:36

## 2025-04-24 RX ADMIN — HEPARIN SODIUM 5000 UNITS: 5000 INJECTION INTRAVENOUS; SUBCUTANEOUS at 09:35

## 2025-04-24 RX ADMIN — CEFUROXIME AXETIL 500 MG: 250 TABLET ORAL at 09:35

## 2025-04-24 RX ADMIN — GUAIFENESIN 600 MG: 600 TABLET ORAL at 23:01

## 2025-04-24 RX ADMIN — MICONAZOLE NITRATE 1 APPLICATION: 2 POWDER TOPICAL at 11:41

## 2025-04-24 RX ADMIN — ACETAMINOPHEN 650 MG: 325 TABLET ORAL at 04:46

## 2025-04-24 RX ADMIN — HEPARIN SODIUM 5000 UNITS: 5000 INJECTION INTRAVENOUS; SUBCUTANEOUS at 23:01

## 2025-04-24 RX ADMIN — INSULIN GLARGINE 15 UNITS: 100 INJECTION, SOLUTION SUBCUTANEOUS at 23:00

## 2025-04-24 RX ADMIN — Medication 10 ML: at 23:01

## 2025-04-24 RX ADMIN — INSULIN LISPRO 2 UNITS: 100 INJECTION, SOLUTION INTRAVENOUS; SUBCUTANEOUS at 23:00

## 2025-04-24 RX ADMIN — CYCLOBENZAPRINE HYDROCHLORIDE 5 MG: 5 TABLET, FILM COATED ORAL at 23:01

## 2025-04-24 RX ADMIN — CYCLOBENZAPRINE HYDROCHLORIDE 5 MG: 5 TABLET, FILM COATED ORAL at 17:15

## 2025-04-24 RX ADMIN — GUAIFENESIN 600 MG: 600 TABLET ORAL at 09:35

## 2025-04-24 RX ADMIN — HYDROCODONE BITARTRATE AND ACETAMINOPHEN 1 TABLET: 5; 325 TABLET ORAL at 23:02

## 2025-04-24 RX ADMIN — IPRATROPIUM BROMIDE AND ALBUTEROL SULFATE 3 ML: .5; 3 SOLUTION RESPIRATORY (INHALATION) at 11:13

## 2025-04-24 RX ADMIN — ACETAMINOPHEN 650 MG: 325 TABLET ORAL at 15:42

## 2025-04-24 RX ADMIN — HYDROCODONE BITARTRATE AND ACETAMINOPHEN 1 TABLET: 5; 325 TABLET ORAL at 11:41

## 2025-04-24 RX ADMIN — INSULIN LISPRO 2 UNITS: 100 INJECTION, SOLUTION INTRAVENOUS; SUBCUTANEOUS at 17:15

## 2025-04-24 RX ADMIN — ACETAMINOPHEN 650 MG: 325 TABLET ORAL at 21:12

## 2025-04-24 RX ADMIN — MICONAZOLE NITRATE 1 APPLICATION: 2 POWDER TOPICAL at 23:01

## 2025-04-24 RX ADMIN — Medication 10 ML: at 09:45

## 2025-04-24 RX ADMIN — FUROSEMIDE 80 MG: 10 INJECTION, SOLUTION INTRAMUSCULAR; INTRAVENOUS at 04:46

## 2025-04-24 RX ADMIN — CYCLOBENZAPRINE HYDROCHLORIDE 5 MG: 5 TABLET, FILM COATED ORAL at 04:47

## 2025-04-24 NOTE — SIGNIFICANT NOTE
Wound Eval / Progress Noted    DUSTY Arnett     Patient Name: Desi Krishnan  : 1965  MRN: [id number]  Today's Date: 2025                 Admit Date: 2025    Visit Dx:    ICD-10-CM ICD-9-CM   1. Acute renal failure, unspecified acute renal failure type  N17.9 584.9   2. Acute UTI  N39.0 599.0   3. Right lower lobe pulmonary infiltrate  R91.8 793.19         Acute on chronic respiratory failure with hypoxia    Obesity, Class III, BMI 40-49.9 (morbid obesity)    CHF (congestive heart failure), NYHA class II, unspecified failure chronicity, diastolic    LVH (left ventricular hypertrophy)    SOB (shortness of breath)    Acute kidney injury superimposed on chronic kidney disease    Right lower lobe pulmonary infiltrate    Acute UTI    Hyperkalemia    Metabolic acidosis    Acute on chronic diastolic (congestive) heart failure    History of anemia due to chronic kidney disease    Diabetic nephropathy        Past Medical History:   Diagnosis Date    Diabetes     History of anemia due to chronic kidney disease 2025    Hypertension         Past Surgical History:   Procedure Laterality Date    KNEE SURGERY      TUBAL ABDOMINAL LIGATION           Physical Assessment:  Wound 25 0200 midline abdomen (Active)   Wound Image   25   Dressing Appearance open to air 25   Closure None 25   Base moist;red;dry;tan;scab 25   Periwound dry;pink 25   Periwound Temperature warm 25   Periwound Skin Turgor soft 25   Edges open 25   Drainage Characteristics/Odor serosanguineous 25   Drainage Amount scant 25   Care, Wound cleansed with;sterile normal saline 25   Dressing Care dressing applied;silver impregnated;hydrofiber;silicone border foam 25   Periwound Care absorptive dressing applied 25       Wound 25 sacral spine Pressure Injury (Active)   Wound Image   25    Pressure Injury Stage DTPI 04/23/25 2035   Dressing Appearance open to air 04/23/25 2035   Closure None 04/23/25 2035   Base closed/resurfaced;dry;pink 04/23/25 2035   Periwound dry;pink;redness 04/23/25 2035   Periwound Temperature warm 04/23/25 2035   Periwound Skin Turgor soft 04/23/25 2035   Edges rolled/closed 04/23/25 2035   Drainage Amount none 04/23/25 2035   Care, Wound cleansed with;sterile normal saline 04/23/25 2035   Dressing Care open to air;skin barrier agent applied 04/23/25 2035   Periwound Care barrier ointment applied 04/23/25 2035        Wound Check / Follow-up:   Patient seen today for wound follow up.  Patient is awake, alert, and oriented.  Patient reports she is a long-term care resident at [facility].  Patient is on a specialty air mattress.  Patient was noted to have been incontinent of stool prior to assessment.  Incontinence care and linen change provided with assistance per primary RN.  Patient with an indwelling urinary catheter.     Moisture associated skin damage to abdominal folds, groin, under bilateral breasts, and behind knee folds is noted to be resolving with scattered areas of light pink skin.  No areas of erosion of fungal presentation noted at this time.  Recommending to continue quality skin care and hygiene with twice daily application of miconazole powder to the folds and creases, along with application of pillowcases to assist with moisture management, at this time to avoid rebound of fungal presentation.     Resolving DTPI to left and right aspect of sacrum. Skin is intact with blanchable pink and red coloration. No areas of non-blanchable skin noted at this time. Recommending to continue quality skin care and hygiene with application of blue top moisture barrier three times a day, and as needed for incontinence.  Implement every 2 hour turns, and offload at all times.  Keep patient clean, dry, and free from all moisture.    Ulcerations noted to  proximal aspect of umbilicus. Scattered wound bases with moist red and pink tissue, as well as areas of dry tan crusting. Cleansed with normal saline and gauze, blotted dry.  Recommending every other day dressing changes with silver impregnated Hydrofiber and silicone border dressing securement.     Impression: Resolving MASD to abdominal folds, groin, under bilateral breasts. Resolving deep tissue pressure injuries to bilateral aspects of sacrum. Ulcerations to umbilicus.     Short term goals: Regain skin integrity, skin protection, moisture prevention, pressure reduction, quality skin care and hygiene, every other day dressing change, topical treatment.    Racheal Bee RN    4/23/2025    22:32 EDT

## 2025-04-24 NOTE — PROGRESS NOTES
[facility]     Progress Note    Patient Name: Desi Krishnan  : 1965  MRN: [id number]  Primary Care Physician:  Alba Guzman MD  Date of admission: 2025    Subjective   Patient had palliative care consult and continued ongoing medical management  Renal dysfunction continues to improve  No major acute events overnight      Scheduled Meds:cefuroxime, 500 mg, Oral, Q24H  fluticasone, 2 spray, Each Nare, Daily  guaiFENesin, 600 mg, Oral, Q12H  heparin (porcine), 5,000 Units, Subcutaneous, Q12H  insulin glargine, 15 Units, Subcutaneous, Nightly  insulin lispro, 2-9 Units, Subcutaneous, 4x Daily AC & at Bedtime  ipratropium-albuterol, 3 mL, Nebulization, Daily - RT  metOLazone, 5 mg, Oral, Daily  miconazole, 1 Application, Topical, 2 times per day  sodium chloride, 10 mL, Intravenous, Q12H  torsemide, 40 mg, Oral, Daily      Continuous Infusions:   PRN Meds:.  acetaminophen    senna-docusate sodium **AND** polyethylene glycol **AND** bisacodyl **AND** bisacodyl    cyclobenzaprine    dextrose    dextrose    dextrose    glucagon (human recombinant)    guaiFENesin-dextromethorphan    HYDROcodone-acetaminophen    ipratropium-albuterol    ondansetron    sodium chloride    sodium chloride    sodium chloride       Review of Systems  Constitutional:        Weakness tiredness fatigue  Eyes:                       No blurry vision, eye discharge, eye irritation, eye pain  HEENT:                   No acute hair loss, earache and discharge, nasal congestion or discharge, sore throat, postnasal drip  Respiratory:           No shortness of breath coughing sputum production wheezing hemoptysis pleuritic chest pain  Cardiovascular:     No chest pain, orthopnea, PND, dizziness, palpitation, lower extremity edema  Gastrointestinal:   No nausea vomiting diarrhea abdominal pain constipation  Genitourinary:       No urinary incontinence, hesitancy, frequency, urgency, dysuria  Hematologic:         No bruising,  bleeding, pallor, lymphadenopathy  Endocrine:            No coldness, hot flashes, polyuria, abnormal hair growth  Musculoskeletal:    No body pains, aches, arthritic pains, muscle pain ,muscle wasting  Psychiatric:          No low or high mood, anxiety, hallucinations, delusions  Skin.                      No rash, ulcers, bruising, itching  Neurological:        No confusion, headache, focal weakness, numbness, dysphasia    Objective   Objective     Vitals:   Temp:  [98.1 °F (36.7 °C)-98.7 °F (37.1 °C)] 98.7 °F (37.1 °C)  Heart Rate:  [74-78] 78  Resp:  [16-18] 18  BP: (131-161)/(52-63) 161/63  Flow (L/min) (Oxygen Therapy):  [2] 2  Physical Exam    Constitutional: Awake, alert responsive, conversant, no obvious distress              Psychiatric:  Appropriate affect, cooperative   Neurologic:  Awake alert ,oriented x 3, strength symmetric in all extremities, Cranial Nerves grossly intact to confrontation, speech clear   Eyes:   PERRLA, sclerae anicteric, no conjunctival injection   HEENT:  Moist mucous membranes, no nasal or eye discharge, no throat congestion   Neck:   Supple, no thyromegaly, no lymphadenopathy, trachea midline, no elevated JVD   Respiratory:  Clear to auscultation bilaterally, nonlabored respirations    Cardiovascular: RRR, no murmurs, rubs, or gallops, palpable pedal pulses bilaterally, No bilateral ankle edema   Gastrointestinal: Positive bowel sounds, soft, nontender, nondistended, no organomegaly   Musculoskeletal:  No clubbing or cyanosis to extremities,muscle wasting, joint swelling, muscle weakness             Skin:                      No rashes, bruising, skin ulcers, petechiae or ecchymosis    Result Review    Result Review:  I have personally reviewed the results from the time of this admission to 4/24/2025 08:05 EDT and agree with these findings:  []  Laboratory  []  Microbiology  []  Radiology  []  EKG/Telemetry   []  Cardiology/Vascular   []  Pathology  []  Old records  []   Other:    Assessment & Plan   Assessment / Plan       Active Hospital Problems:  Active Hospital Problems    Diagnosis     **Acute on chronic respiratory failure with hypoxia     Acute kidney injury superimposed on chronic kidney disease     Right lower lobe pulmonary infiltrate     Acute UTI     Hyperkalemia     Metabolic acidosis     Acute on chronic diastolic (congestive) heart failure     History of anemia due to chronic kidney disease     Diabetic nephropathy     LVH (left ventricular hypertrophy)     CHF (congestive heart failure), NYHA class II, unspecified failure chronicity, diastolic     SOB (shortness of breath)     Obesity, Class III, BMI 40-49.9 (morbid obesity)      60-year-old female with past medical history of longstanding diabetes complicated with peripheral vascular disease status post amputation, hypertension, hyperlipidemia, hypothyroidism, morbid obesity, neuropathy secondary to diabetes, CKD stage IV with baseline creatinine of 2.5-2.9 with nephrotic range proteinuria who came in with worsening swelling and generalized weakness noted to have a rising creatinine to 5.3 and uptrending in the setting of diuresis.  Over the last 48 hours creatinine has started to trend down currently at 4    Plan:   Patient's bicarb is uptrending and will decrease diuretics and switch over to p.o. torsemide 40 mg and metolazone 5 mg  Will discontinue bicarb tabs  Encouraging to see patient's renal dysfunction is improving  Palliative care following.  Ongoing medical care with full code  Completed IV iron for iron deficiency anemia  Patient on antibiotics for possible pneumonia and completing course today

## 2025-04-24 NOTE — PLAN OF CARE
Goal Outcome Evaluation:              Outcome Evaluation: A&Ox4. Forgetful. x2 BM soft and formed. Patient intermittently refuses turns. 2LNC. Guzmán remains in place. No complaints at this time. Call light in reach. ACHS.

## 2025-04-24 NOTE — PROGRESS NOTES
[facility]   Progress Note    Patient Name: Desi Krishnan  : 1965  MRN: [id number]  Primary Care Physician:  Alba Guzman MD  Date of admission: 2025    Subjective   Subjective     I have examined this patient at bedside this morning. They report feeling well. No acute events overnight. Patient is having bowel movements and is urinating a lot. Patient is tolerating diet. I discussed the case with the patient's RN.      Review of Systems   Constitutional:  Negative for chills, diaphoresis, fatigue and fever.   HENT:  Negative for facial swelling, rhinorrhea, sneezing, sore throat, trouble swallowing and voice change.    Eyes:  Negative for photophobia, redness and visual disturbance.   Respiratory:  Negative for cough, shortness of breath and stridor.    Cardiovascular:  Negative for chest pain, palpitations and leg swelling.   Gastrointestinal:  Negative for abdominal pain, anal bleeding, blood in stool, constipation, diarrhea, nausea and vomiting.   Endocrine: Negative for polyuria.   Genitourinary:  Negative for difficulty urinating, dysuria, frequency, hematuria and urgency.   Musculoskeletal:  Negative for arthralgias, back pain, joint swelling, myalgias and neck stiffness.   Skin:  Negative for rash and wound.   Allergic/Immunologic: Negative for immunocompromised state.   Neurological:  Negative for seizures, syncope, facial asymmetry, speech difficulty, weakness, light-headedness, numbness and headaches.   Hematological:  Does not bruise/bleed easily.   Psychiatric/Behavioral:  Negative for agitation, confusion and hallucinations.        Objective   Objective     Vitals:   Temp:  [98.2 °F (36.8 °C)-99.1 °F (37.3 °C)] 99.1 °F (37.3 °C)  Heart Rate:  [68-81] 73  Resp:  [16-18] 16  BP: (134-161)/(49-63) 140/57  Flow (L/min) (Oxygen Therapy):  [2] 2    Physical Exam  Constitutional:       General: She is not in acute distress.     Appearance: Normal appearance. She is normal weight.  She is not ill-appearing, toxic-appearing or diaphoretic.   HENT:      Head: Normocephalic and atraumatic.      Nose: No rhinorrhea.      Mouth/Throat:      Mouth: Mucous membranes are moist.      Pharynx: Oropharynx is clear.   Eyes:      General: No scleral icterus.        Right eye: No discharge.         Left eye: No discharge.      Extraocular Movements: Extraocular movements intact.      Conjunctiva/sclera: Conjunctivae normal.   Cardiovascular:      Rate and Rhythm: Normal rate and regular rhythm.      Heart sounds: No murmur heard.  Pulmonary:      Effort: Pulmonary effort is normal. No respiratory distress.      Breath sounds: Normal breath sounds. No wheezing, rhonchi or rales.   Abdominal:      General: Abdomen is flat. There is no distension.      Palpations: Abdomen is soft.      Tenderness: There is no abdominal tenderness. There is no guarding.   Musculoskeletal:         General: No signs of injury.      Cervical back: No rigidity or tenderness.      Right lower leg: No edema.      Left lower leg: No edema.      Right Lower Extremity: Right leg is amputated below knee.      Left Lower Extremity: Left leg is amputated below knee.   Skin:     General: Skin is warm and dry.      Coloration: Skin is not jaundiced or pale.   Neurological:      General: No focal deficit present.      Mental Status: She is alert and oriented to person, place, and time. Mental status is at baseline.      Cranial Nerves: No cranial nerve deficit.      Motor: No weakness.   Psychiatric:         Mood and Affect: Mood normal.         Thought Content: Thought content normal.          Result Review    Result Review:  I have personally reviewed the results from the time of this admission to 4/24/2025 16:53 EDT and agree with these findings:  [x]  Laboratory list / accordion  [x]  Microbiology  [x]  Radiology  [x]  EKG/Telemetry   [x]  Cardiology/Vascular   []  Pathology  [x]  Old records  []  Other:        Assessment & Plan    Assessment / Plan     Brief Patient Summary:  Desi Krishnan is a 60 y.o. female who is admitted to our facility for acute COPD, pneumonia, and FUAD on CKD.  The patient's renal function was severely diminished and was recommended that she start dialysis.  Patient and her family however refused dialysis and opted for IV diuretics.  She has been getting diuresed and her renal function is steadily improving.  She has finished her antibiotic course for her pneumonia.    Active Hospital Problems:  Active Hospital Problems    Diagnosis     **Acute on chronic respiratory failure with hypoxia     Acute kidney injury superimposed on chronic kidney disease     Right lower lobe pulmonary infiltrate     Acute UTI     Hyperkalemia     Metabolic acidosis     Acute on chronic diastolic (congestive) heart failure     History of anemia due to chronic kidney disease     Diabetic nephropathy     LVH (left ventricular hypertrophy)     CHF (congestive heart failure), NYHA class II, unspecified failure chronicity, diastolic     SOB (shortness of breath)     Obesity, Class III, BMI 40-49.9 (morbid obesity)      FUAD on CKD 4  - Secondary to volume overload  - She has been diuresing well  - Currently on metolazone and torsemide  -From nephrology is following and they decreased her diuresis today given increase in bicarb    Acute cystitis  -Right lower lobe pneumonia  -She hs completed antibiotics for these problems    CHF  -Management as above    Type 2 diabetes  -Lantus 15 nightly  -Lispro 2-9 units q. ACHS  - Blood sugar has been largely controlled      VTE ppx: subq heparin    Stress Ulcer ppx: not indicated     Estimated Medical Readiness Discharge Date:  04/25/25     Anticipated Disposition: skilled nursing     Discharge Milestones:  approval by specialists        CODE STATUS:    Code Status (Patient has no pulse and is not breathing): CPR (Attempt to Resuscitate)  Medical Interventions (Patient has pulse or is breathing): Full  Support      Matthew Mosher MD

## 2025-04-24 NOTE — PLAN OF CARE
Goal Outcome Evaluation:           Progress: improving  Outcome Evaluation: A&Ox4. Forgetful at times. Bilat BKA. Bariatric bed in place. Encouraged to increase bed mobility with engagement at times and refusing some q2 hour turns in favor of supine position. VSS on 2L NC.  No acute distress noted.  Norco and Flexeril x 1 for pain and muscle spasms effective per patient. Wound care per wound care team this shift.Tele NSR AVB HR 75. F/C patent and draining without difficulty. Care plan continues. Call bell within reach at all times for needs and safety.

## 2025-04-25 VITALS
HEIGHT: 62 IN | DIASTOLIC BLOOD PRESSURE: 51 MMHG | HEART RATE: 63 BPM | WEIGHT: 293 LBS | SYSTOLIC BLOOD PRESSURE: 147 MMHG | TEMPERATURE: 98.4 F | BODY MASS INDEX: 53.92 KG/M2 | OXYGEN SATURATION: 99 % | RESPIRATION RATE: 18 BRPM

## 2025-04-25 PROBLEM — I50.33 ACUTE ON CHRONIC DIASTOLIC (CONGESTIVE) HEART FAILURE: Status: RESOLVED | Noted: 2025-04-18 | Resolved: 2025-04-25

## 2025-04-25 PROBLEM — E87.5 HYPERKALEMIA: Status: RESOLVED | Noted: 2025-04-18 | Resolved: 2025-04-25

## 2025-04-25 PROBLEM — R06.02 SOB (SHORTNESS OF BREATH): Status: RESOLVED | Noted: 2025-03-19 | Resolved: 2025-04-25

## 2025-04-25 PROBLEM — R91.8 RIGHT LOWER LOBE PULMONARY INFILTRATE: Status: RESOLVED | Noted: 2025-04-18 | Resolved: 2025-04-25

## 2025-04-25 PROBLEM — I50.32 CHRONIC HEART FAILURE WITH PRESERVED EJECTION FRACTION (HFPEF): Status: ACTIVE | Noted: 2025-04-25

## 2025-04-25 PROBLEM — J96.21 ACUTE ON CHRONIC RESPIRATORY FAILURE WITH HYPOXIA: Status: RESOLVED | Noted: 2025-04-18 | Resolved: 2025-04-25

## 2025-04-25 PROBLEM — E87.20 METABOLIC ACIDOSIS: Status: RESOLVED | Noted: 2025-04-18 | Resolved: 2025-04-25

## 2025-04-25 PROBLEM — N39.0 ACUTE UTI: Status: RESOLVED | Noted: 2025-04-18 | Resolved: 2025-04-25

## 2025-04-25 LAB
ANION GAP SERPL CALCULATED.3IONS-SCNC: 10.9 MMOL/L (ref 5–15)
BUN SERPL-MCNC: 88 MG/DL (ref 6–20)
BUN/CREAT SERPL: 25.7 (ref 7–25)
CALCIUM SPEC-SCNC: 8.5 MG/DL (ref 8.6–10.5)
CHLORIDE SERPL-SCNC: 99 MMOL/L (ref 98–107)
CO2 SERPL-SCNC: 32.1 MMOL/L (ref 22–29)
CREAT SERPL-MCNC: 3.42 MG/DL (ref 0.57–1)
DEPRECATED RDW RBC AUTO: 50.7 FL (ref 37–54)
EGFRCR SERPLBLD CKD-EPI 2021: 15 ML/MIN/1.73
ERYTHROCYTE [DISTWIDTH] IN BLOOD BY AUTOMATED COUNT: 15.6 % (ref 12.3–15.4)
GLUCOSE BLDC GLUCOMTR-MCNC: 113 MG/DL (ref 70–99)
GLUCOSE BLDC GLUCOMTR-MCNC: 119 MG/DL (ref 70–99)
GLUCOSE SERPL-MCNC: 133 MG/DL (ref 65–99)
HCT VFR BLD AUTO: 30.3 % (ref 34–46.6)
HGB BLD-MCNC: 9.1 G/DL (ref 12–15.9)
MCH RBC QN AUTO: 26.9 PG (ref 26.6–33)
MCHC RBC AUTO-ENTMCNC: 30 G/DL (ref 31.5–35.7)
MCV RBC AUTO: 89.6 FL (ref 79–97)
PLATELET # BLD AUTO: 207 10*3/MM3 (ref 140–450)
PMV BLD AUTO: 10.3 FL (ref 6–12)
POTASSIUM SERPL-SCNC: 3.5 MMOL/L (ref 3.5–5.2)
RBC # BLD AUTO: 3.38 10*6/MM3 (ref 3.77–5.28)
SODIUM SERPL-SCNC: 142 MMOL/L (ref 136–145)
WBC NRBC COR # BLD AUTO: 5.73 10*3/MM3 (ref 3.4–10.8)

## 2025-04-25 PROCEDURE — 82948 REAGENT STRIP/BLOOD GLUCOSE: CPT

## 2025-04-25 PROCEDURE — 94760 N-INVAS EAR/PLS OXIMETRY 1: CPT

## 2025-04-25 PROCEDURE — 99239 HOSP IP/OBS DSCHRG MGMT >30: CPT | Performed by: INTERNAL MEDICINE

## 2025-04-25 PROCEDURE — 25010000002 HEPARIN (PORCINE) PER 1000 UNITS: Performed by: FAMILY MEDICINE

## 2025-04-25 PROCEDURE — 94799 UNLISTED PULMONARY SVC/PX: CPT

## 2025-04-25 PROCEDURE — 94664 DEMO&/EVAL PT USE INHALER: CPT

## 2025-04-25 PROCEDURE — 85027 COMPLETE CBC AUTOMATED: CPT | Performed by: INTERNAL MEDICINE

## 2025-04-25 PROCEDURE — 80048 BASIC METABOLIC PNL TOTAL CA: CPT | Performed by: FAMILY MEDICINE

## 2025-04-25 PROCEDURE — 36415 COLL VENOUS BLD VENIPUNCTURE: CPT | Performed by: FAMILY MEDICINE

## 2025-04-25 RX ORDER — METOLAZONE 2.5 MG/1
2.5 TABLET ORAL DAILY
Status: DISCONTINUED | OUTPATIENT
Start: 2025-04-25 | End: 2025-04-25 | Stop reason: HOSPADM

## 2025-04-25 RX ORDER — METOLAZONE 2.5 MG/1
2.5 TABLET ORAL DAILY
Qty: 30 TABLET | Refills: 0 | Status: SHIPPED | OUTPATIENT
Start: 2025-04-26

## 2025-04-25 RX ADMIN — HYDROCODONE BITARTRATE AND ACETAMINOPHEN 1 TABLET: 5; 325 TABLET ORAL at 18:32

## 2025-04-25 RX ADMIN — TORSEMIDE 40 MG: 20 TABLET ORAL at 08:29

## 2025-04-25 RX ADMIN — ACETAMINOPHEN 650 MG: 325 TABLET ORAL at 02:01

## 2025-04-25 RX ADMIN — ACETAMINOPHEN 650 MG: 325 TABLET ORAL at 08:29

## 2025-04-25 RX ADMIN — GUAIFENESIN 600 MG: 600 TABLET ORAL at 08:29

## 2025-04-25 RX ADMIN — Medication 10 ML: at 08:30

## 2025-04-25 RX ADMIN — METOLAZONE 2.5 MG: 2.5 TABLET ORAL at 08:29

## 2025-04-25 RX ADMIN — ACETAMINOPHEN 650 MG: 325 TABLET ORAL at 14:45

## 2025-04-25 RX ADMIN — FLUTICASONE PROPIONATE 2 SPRAY: 50 SPRAY, METERED NASAL at 08:31

## 2025-04-25 RX ADMIN — IPRATROPIUM BROMIDE AND ALBUTEROL SULFATE 3 ML: .5; 3 SOLUTION RESPIRATORY (INHALATION) at 07:41

## 2025-04-25 RX ADMIN — HEPARIN SODIUM 5000 UNITS: 5000 INJECTION INTRAVENOUS; SUBCUTANEOUS at 08:29

## 2025-04-25 NOTE — PROGRESS NOTES
[facility]     Progress Note    Patient Name: Desi Krishnan  : 1965  MRN: [id number]  Primary Care Physician:  Alba Guzman MD  Date of admission: 2025    Subjective   Patient's renal dysfunction is improving  Overall feels better  No major acute events overnight    Scheduled Meds:fluticasone, 2 spray, Each Nare, Daily  guaiFENesin, 600 mg, Oral, Q12H  heparin (porcine), 5,000 Units, Subcutaneous, Q12H  insulin glargine, 15 Units, Subcutaneous, Nightly  insulin lispro, 2-9 Units, Subcutaneous, 4x Daily AC & at Bedtime  ipratropium-albuterol, 3 mL, Nebulization, Daily - RT  metOLazone, 2.5 mg, Oral, Daily  miconazole, 1 Application, Topical, 2 times per day  sodium chloride, 10 mL, Intravenous, Q12H  torsemide, 40 mg, Oral, Daily      Continuous Infusions:   PRN Meds:.  acetaminophen    senna-docusate sodium **AND** polyethylene glycol **AND** bisacodyl **AND** bisacodyl    cyclobenzaprine    dextrose    dextrose    dextrose    glucagon (human recombinant)    guaiFENesin-dextromethorphan    HYDROcodone-acetaminophen    ipratropium-albuterol    ondansetron    sodium chloride    sodium chloride    sodium chloride       Review of Systems  Constitutional:        Weakness tiredness fatigue  Eyes:                       No blurry vision, eye discharge, eye irritation, eye pain  HEENT:                   No acute hair loss, earache and discharge, nasal congestion or discharge, sore throat, postnasal drip  Respiratory:           No shortness of breath coughing sputum production wheezing hemoptysis pleuritic chest pain  Cardiovascular:     No chest pain, orthopnea, PND, dizziness, palpitation, lower extremity edema  Gastrointestinal:   No nausea vomiting diarrhea abdominal pain constipation  Genitourinary:       No urinary incontinence, hesitancy, frequency, urgency, dysuria  Hematologic:         No bruising, bleeding, pallor, lymphadenopathy  Endocrine:            No coldness, hot flashes,  polyuria, abnormal hair growth  Musculoskeletal:    No body pains, aches, arthritic pains, muscle pain ,muscle wasting  Psychiatric:          No low or high mood, anxiety, hallucinations, delusions  Skin.                      No rash, ulcers, bruising, itching  Neurological:        No confusion, headache, focal weakness, numbness, dysphasia    Objective   Objective     Vitals:   Temp:  [97.9 °F (36.6 °C)-99.1 °F (37.3 °C)] 97.9 °F (36.6 °C)  Heart Rate:  [68-81] 77  Resp:  [16-18] 18  BP: (134-165)/(49-97) 135/97  Flow (L/min) (Oxygen Therapy):  [2] 2  Physical Exam    Constitutional: Awake, alert responsive, conversant, no obvious distress              Psychiatric:  Appropriate affect, cooperative   Neurologic:  Awake alert ,oriented x 3, strength symmetric in all extremities, Cranial Nerves grossly intact to confrontation, speech clear   Eyes:   PERRLA, sclerae anicteric, no conjunctival injection   HEENT:  Moist mucous membranes, no nasal or eye discharge, no throat congestion   Neck:   Supple, no thyromegaly, no lymphadenopathy, trachea midline, no elevated JVD   Respiratory:  Clear to auscultation bilaterally, nonlabored respirations    Cardiovascular: RRR, no murmurs, rubs, or gallops, palpable pedal pulses bilaterally, No bilateral ankle edema   Gastrointestinal: Positive bowel sounds, soft, nontender, nondistended, no organomegaly   Musculoskeletal:  No clubbing or cyanosis to extremities,muscle wasting, joint swelling, muscle weakness             Skin:                      No rashes, bruising, skin ulcers, petechiae or ecchymosis    Result Review    Result Review:  I have personally reviewed the results from the time of this admission to 4/25/2025 07:22 EDT and agree with these findings:  []  Laboratory  []  Microbiology  []  Radiology  []  EKG/Telemetry   []  Cardiology/Vascular   []  Pathology  []  Old records  []  Other:    Assessment & Plan   Assessment / Plan       Active Hospital Problems:  Active  Hospital Problems    Diagnosis     **Acute on chronic respiratory failure with hypoxia     Acute kidney injury superimposed on chronic kidney disease     Right lower lobe pulmonary infiltrate     Acute UTI     Hyperkalemia     Metabolic acidosis     Acute on chronic diastolic (congestive) heart failure     History of anemia due to chronic kidney disease     Diabetic nephropathy     LVH (left ventricular hypertrophy)     CHF (congestive heart failure), NYHA class II, unspecified failure chronicity, diastolic     SOB (shortness of breath)     Obesity, Class III, BMI 40-49.9 (morbid obesity)      60-year-old female with past medical history of longstanding diabetes complicated with peripheral vascular disease status post amputation, hypertension, hyperlipidemia, hypothyroidism, morbid obesity, neuropathy secondary to diabetes, CKD stage IV with baseline creatinine of 2.5-2.9 with nephrotic range proteinuria who came in with worsening swelling and generalized weakness noted to have a rising creatinine to 5.3 and uptrending in the setting of diuresis.  Over the last 48 hours creatinine has started to trend down currently at 3.4    Plan:   Continue torsemide 40 mg and decrease metolazone to 2.5 mg  Encouraging to see patient's renal dysfunction is improving  Palliative care following.  Ongoing medical care with full code  Completed IV iron for iron deficiency anemia  Patient completed course of antibiotics for possible pneumonia     Okay from renal standpoint to discharge the patient per primary.  Will follow the patient up in clinic in 1 to 2 weeks

## 2025-04-25 NOTE — DISCHARGE SUMMARY
[facility]         HOSPITALIST  DISCHARGE SUMMARY    Patient Name: Desi Krishnan  : 1965  MRN: [id number]    Date of Admission: 2025  Date of Discharge: 2025  Primary Care Physician: Alba Guzman MD    Consults       Date and Time Order Name Status Description    2025  3:05 AM Inpatient Nephrology Consult      2025 11:53 PM Hospitalist (on-call MD unless specified) Completed             Active and Resolved Hospital Problems:  Active Hospital Problems    Diagnosis POA    Chronic heart failure with preserved ejection fraction (HFpEF) [I50.32] Yes    Acute kidney injury superimposed on chronic kidney disease [N17.9, N18.9] Yes    History of anemia due to chronic kidney disease [N18.9, Z86.2] Not Applicable    Diabetic nephropathy [E11.21] Unknown    LVH (left ventricular hypertrophy) [I51.7] Yes    CHF (congestive heart failure), NYHA class II, unspecified failure chronicity, diastolic [I50.30] Yes    Obesity, Class III, BMI 40-49.9 (morbid obesity) [E66.01] Yes      Resolved Hospital Problems    Diagnosis POA    **Acute on chronic respiratory failure with hypoxia [J96.21] Yes    Right lower lobe pulmonary infiltrate [R91.8] Unknown    Acute UTI [N39.0] Unknown    Hyperkalemia [E87.5] Unknown    Metabolic acidosis [E87.20] Unknown    Acute on chronic diastolic (congestive) heart failure [I50.33] Unknown    SOB (shortness of breath) [R06.02] Yes       Hospital Course     Hospital Course:  Desi Krishnan is a 60 y.o. female who was admitted to our facility for acute COPD, pneumonia, and FUAD on CKD.  The patient's renal function was severely diminished and it was recommended that she start dialysis.  Patient and her family refused dialysis and opted for IV diuretics.  She has been getting diuresed and her renal function is steadily improving.  She has finished her antibiotic course for her pneumonia.  She has an cumulative net I/O of -25 L.  Nephrology cleared  the patient for discharge from their standpoint.  I discharged the patient back to her nursing home with prescription for her torsemide and metolazone, the diuretics that were used to obtain her volume status.  During her stay, she was also noted to have iron deficiency was treated with IV iron.  She is instructed to follow-up with nephrology and with her primary care within 1 week.        DISCHARGE Follow Up Recommendations for labs and diagnostics: CBC, CMP, reticulocyte count within 1 month      Day of Discharge     Vital Signs:  Temp:  [97.9 °F (36.6 °C)-98.4 °F (36.9 °C)] 98.4 °F (36.9 °C)  Heart Rate:  [63-78] 63  Resp:  [18] 18  BP: (129-165)/(51-97) 147/51  Flow (L/min) (Oxygen Therapy):  [2] 2  Physical Exam:   Constitutional:       General: She is not in acute distress.     Appearance: Normal appearance. She is normal weight. She is not ill-appearing, toxic-appearing or diaphoretic.   HENT:      Head: Normocephalic and atraumatic.      Nose: No rhinorrhea.      Mouth/Throat:      Mouth: Mucous membranes are moist.      Pharynx: Oropharynx is clear.   Eyes:      General: No scleral icterus.        Right eye: No discharge.         Left eye: No discharge.      Extraocular Movements: Extraocular movements intact.      Conjunctiva/sclera: Conjunctivae normal.   Cardiovascular:      Rate and Rhythm: Normal rate and regular rhythm.      Heart sounds: No murmur heard.  Pulmonary:      Effort: Pulmonary effort is normal. No respiratory distress.      Breath sounds: Normal breath sounds. No wheezing, rhonchi or rales.   Abdominal:      General: Abdomen is flat. There is no distension.      Palpations: Abdomen is soft.      Tenderness: There is no abdominal tenderness. There is no guarding.   Musculoskeletal:         General: No signs of injury.      Cervical back: No rigidity or tenderness.      Right lower leg: No edema.      Left lower leg: No edema.      Right Lower Extremity: Right leg is amputated below knee.       Left Lower Extremity: Left leg is amputated below knee.   Skin:     General: Skin is warm and dry.      Coloration: Skin is not jaundiced or pale.   Neurological:      General: No focal deficit present.      Mental Status: She is alert and oriented to person, place, and time. Mental status is at baseline.      Cranial Nerves: No cranial nerve deficit.      Motor: No weakness.   Psychiatric:         Mood and Affect: Mood normal.         Thought Content: Thought content normal.      Discharge Details        Discharge Medications        New Medications        Instructions Start Date   metOLazone 2.5 MG tablet  Commonly known as: ZAROXOLYN   2.5 mg, Oral, Daily   Start Date: April 26, 2025     Torsemide 40 MG tablet   40 mg, Oral, Daily   Start Date: April 26, 2025            Continue These Medications        Instructions Start Date   acetaminophen 500 MG tablet  Commonly known as: TYLENOL   Take 1 tablet by mouth Every 6 (Six) Hours As Needed for Mild Pain, Headache or Fever.      amLODIPine 10 MG tablet  Commonly known as: NORVASC   Take 1 tablet by mouth Daily.      atorvastatin 40 MG tablet  Commonly known as: LIPITOR   Take 1 tablet by mouth Daily.      BASAGLAR KWIKPEN 100 UNIT/ML injection pen   Inject 16 Units under the skin into the appropriate area as directed 2 (Two) Times a Day.      cyanocobalamin 1000 MCG/ML injection   1,000 mcg, Every 30 Days      cyclobenzaprine 10 MG tablet  Commonly known as: FLEXERIL   10 mg, 3 Times Daily PRN      Farxiga 10 MG tablet  Generic drug: dapagliflozin Propanediol   Take 10 mg by mouth Daily.      furosemide 20 MG tablet  Commonly known as: LASIX   20 mg, Daily      gabapentin 400 MG capsule  Commonly known as: NEURONTIN   400 mg, 3 Times Daily      Glucagon Emergency 1 MG/ML reconstituted solution   1 mg, As Needed      Glucose 77.4 % gel   1 dose, As Needed      HYDROcodone-acetaminophen 5-325 MG per tablet  Commonly known as: NORCO   1 tablet, Every 12 Hours PRN       Insulin Aspart 100 UNIT/ML injection  Commonly known as: novoLOG   3 Times Daily Before Meals      levothyroxine 75 MCG tablet  Commonly known as: SYNTHROID, LEVOTHROID   Take 1 tablet by mouth Every Morning.      Lokelma 10 g packet  Generic drug: sodium zirconium cyclosilicate   10 g, 3 Times Weekly      loratadine 10 MG tablet  Commonly known as: CLARITIN   Take 1 tablet by mouth Daily.      losartan 100 MG tablet  Commonly known as: COZAAR   100 mg, Daily      magnesium hydroxide 400 MG/5ML suspension  Commonly known as: MILK OF MAGNESIA   30 mL, Nightly PRN      metFORMIN 500 MG tablet  Commonly known as: GLUCOPHAGE       naloxone 4 MG/0.1ML nasal spray  Commonly known as: NARCAN   1 spray, As Needed      nystatin 389540 UNIT/GM powder  Commonly known as: MYCOSTATIN   1 Application, 2 Times Daily      sodium chloride 0.65 % nasal spray   1 spray, Every 30 Minutes PRN      sore muscle 10-30 % cream cream   1 Application, 2 Times Daily PRN      vitamin D 1.25 MG (84313 UT) capsule capsule  Commonly known as: ERGOCALCIFEROL   Take 1 capsule by mouth Every 7 (Seven) Days. Sunday             Stop These Medications      levoFLOXacin 750 MG tablet  Commonly known as: LEVAQUIN              Allergies   Allergen Reactions    Hydrochlorothiazide     Penicillin G Hives    Penicillins     Vancomycin        Discharge Disposition:  Skilled Nursing Facility (DC - External)    Diet:  Hospital:  Diet Order   Procedures    Diet: Diabetic, Fluid Restriction (240 mL/tray); Consistent Carbohydrate; 1500 mL/day; Fluid Consistency: Thin (IDDSI 0)       Discharge Activity: As tolerated      CODE STATUS:  Code Status and Medical Interventions: CPR (Attempt to Resuscitate); Full Support   Ordered at: 04/18/25 0741     Code Status (Patient has no pulse and is not breathing):    CPR (Attempt to Resuscitate)     Medical Interventions (Patient has pulse or is breathing):    Full Support         Future Appointments   Date Time Provider  [facility]   5/8/2025  1:00 PM Bruce Wagner PA [facility] ORS [place] [facility]   7/21/2025  9:15 AM Siena Arteaga MD [facility] CD [place] [facility]   9/26/2025  1:00 PM Zacarias Obando MD [facility]           Pertinent  and/or Most Recent Results     PROCEDURES:   None    LAB RESULTS:      Lab 04/25/25  0451 04/24/25  0548 04/23/25  0526 04/22/25  0602 04/21/25  0550 04/20/25  0512   WBC 5.73 6.62 6.56 7.23 5.88 6.43   HEMOGLOBIN 9.1* 9.0* 8.6* 9.0* 8.4* 8.5*   HEMATOCRIT 30.3* 29.7* 29.6* 30.0* 27.2* 28.0*   PLATELETS 207 207 203 173 218 218   NEUTROS ABS  --  4.89 4.86 5.61 4.24 4.38   IMMATURE GRANS (ABS)  --  0.02 0.02 0.02 0.01 0.02   LYMPHS ABS  --  0.75 0.75 0.70 0.76 0.96   MONOS ABS  --  0.61 0.59 0.70 0.45 0.55   EOS ABS  --  0.32 0.30 0.18 0.39 0.48*   MCV 89.6 90.0 94.0 93.8 88.3 90.0         Lab 04/25/25  0451 04/24/25  0548 04/23/25  0526 04/22/25  0602 04/21/25  0550 04/20/25  0512   SODIUM 142 141 138 138 140 139   POTASSIUM 3.5 3.3* 4.1 4.4 4.1 4.5   CHLORIDE 99 97* 98 99 102 102   CO2 32.1* 30.5* 23.1 18.6* 23.4 20.9*   ANION GAP 10.9 13.5 16.9* 20.4* 14.6 16.1*   BUN 88* 94* 98* 101* 102* 105*   CREATININE 3.42* 3.85* 4.17* 4.63* 5.12* 5.62*   EGFR 15.0* 13.0* 11.8* 10.4* 9.2* 8.2*   GLUCOSE 133* 101* 111* 138* 136* 115*   CALCIUM 8.5* 8.6 8.4* 8.4* 8.3* 8.3*   MAGNESIUM  --  2.4 2.5 2.6 2.8* 2.8*   PHOSPHORUS  --  6.5* 7.5* 7.6* 8.7* 8.6*   TSH  --   --  6.990*  --   --   --          Lab 04/24/25  0548 04/23/25  0526 04/22/25  0602 04/21/25  0550 04/20/25  0512   TOTAL PROTEIN 5.9* 5.8* 6.0 5.6* 5.8*   ALBUMIN 3.1* 2.9* 2.9* 2.7* 2.9*   GLOBULIN 2.8 2.9 3.1 2.9 2.9   ALT (SGPT) 12 8 11 11 14   AST (SGOT) 15 13 18 7 9   BILIRUBIN 0.2 0.2 <0.2 0.2 0.2   ALK PHOS 64 63 70 76 86                     Brief Urine Lab Results  (Last result in the past 365 days)        Color   Clarity   Blood   Leuk Est   Nitrite   Protein   CREAT   Urine HCG        04/17/25 2304 Yellow   Turbid   Moderate (2+)    Moderate (2+)   Negative   >=300 mg/dL (3+)                 Microbiology Results (last 10 days)       Procedure Component Value - Date/Time    Urine Culture - Urine, Indwelling Urethral Catheter [[id number]]  (Abnormal)  (Susceptibility) Collected: 04/17/25 4925    Lab Status: Final result Specimen: Urine from Indwelling Urethral Catheter Updated: 04/20/25 1003     Urine Culture >100,000 CFU/mL Escherichia coli    Narrative:      Colonization of the urinary tract without infection is common. Treatment is discouraged unless the patient is symptomatic, pregnant, or undergoing an invasive urologic procedure.    Susceptibility        Escherichia coli      VALE      Amoxicillin + Clavulanate Susceptible      Ampicillin Susceptible      Ampicillin + Sulbactam Susceptible      Cefazolin (Urine) Susceptible      Cefepime Susceptible      Ceftazidime Susceptible      Ceftriaxone Susceptible      Ciprofloxacin Resistant      Gentamicin Susceptible      Levofloxacin Resistant      Nitrofurantoin Susceptible      Piperacillin + Tazobactam Susceptible      Trimethoprim + Sulfamethoxazole Susceptible                                   XR Chest 1 View  Result Date: 4/17/2025  1.Cardiomegaly. 2.Infiltrate or atelectasis at the right lung base. Electronically Signed: Maurice Griggs MD  4/17/2025 9:57 PM EDT  Workstation ID: [id number]               Results for orders placed during the hospital encounter of 04/03/25    Adult Transthoracic Echo Complete w/ Color, Spectral and Contrast if necessary per protocol    Interpretation Summary    Left ventricular systolic function is hyperdynamic (EF > 70%). Calculated left ventricular EF = 75.1%    Left ventricular wall thickness is consistent with mild to moderate concentric hypertrophy.    Left ventricular diastolic function was indeterminate.    Severe mitral annular calcification (MAC) resulting in mild functional mitral stenosis. The mitral valve mean gradient is 3 mmHg at HR 80  bpm.      Labs Pending at Discharge:        Time spent on Discharge including face to face service: Greater than 30 minutes    Electronically signed by Matthew Mosher MD, 04/25/25, 4:36 PM EDT.

## 2025-04-25 NOTE — NURSING NOTE
Pt transferred to unit. Acclimated to room. Pt complaining of HA, prn medication given. Skin assessment completed. Pt seems to be resting comfortably at this time.

## 2025-04-25 NOTE — PLAN OF CARE
Goal Outcome Evaluation:           Progress: improving  Outcome Evaluation: A&Ox4. VSS on 2LNC. Lungs diminished. x2 Smear BM's.  F/C intact and patent.  Hydrocodone and Flexeril PRN pain/muscle spasms x 1effective. Tele D/Ángel. Transfer orders for 3NT bed 3510. No acute distress noted. Care plan continues.

## 2025-05-05 LAB
QT INTERVAL: 395 MS
QT INTERVAL: 396 MS
QTC INTERVAL: 373 MS
QTC INTERVAL: 457 MS

## 2025-05-07 ENCOUNTER — TRANSCRIBE ORDERS (OUTPATIENT)
Age: 60
End: 2025-05-07
Payer: MEDICARE

## 2025-05-07 DIAGNOSIS — N18.4 CHRONIC KIDNEY DISEASE, STAGE IV (SEVERE): Primary | ICD-10-CM

## 2025-05-08 ENCOUNTER — OFFICE VISIT (OUTPATIENT)
Dept: ORTHOPEDIC SURGERY | Facility: CLINIC | Age: 60
End: 2025-05-08
Payer: MEDICARE

## 2025-05-08 VITALS
WEIGHT: 293 LBS | OXYGEN SATURATION: 94 % | DIASTOLIC BLOOD PRESSURE: 79 MMHG | SYSTOLIC BLOOD PRESSURE: 138 MMHG | BODY MASS INDEX: 53.92 KG/M2 | HEART RATE: 83 BPM | HEIGHT: 62 IN

## 2025-05-08 DIAGNOSIS — M19.011 PRIMARY OSTEOARTHRITIS OF RIGHT SHOULDER: ICD-10-CM

## 2025-05-08 DIAGNOSIS — M75.101 NONTRAUMATIC TEAR OF RIGHT ROTATOR CUFF, UNSPECIFIED TEAR EXTENT: Primary | ICD-10-CM

## 2025-05-08 RX ORDER — TRIAMCINOLONE ACETONIDE 40 MG/ML
40 INJECTION, SUSPENSION INTRA-ARTICULAR; INTRAMUSCULAR
Status: COMPLETED | OUTPATIENT
Start: 2025-05-08 | End: 2025-05-08

## 2025-05-08 RX ORDER — LIDOCAINE HYDROCHLORIDE 10 MG/ML
5 INJECTION, SOLUTION EPIDURAL; INFILTRATION; INTRACAUDAL; PERINEURAL
Status: COMPLETED | OUTPATIENT
Start: 2025-05-08 | End: 2025-05-08

## 2025-05-08 RX ADMIN — TRIAMCINOLONE ACETONIDE 40 MG: 40 INJECTION, SUSPENSION INTRA-ARTICULAR; INTRAMUSCULAR at 14:00

## 2025-05-08 RX ADMIN — LIDOCAINE HYDROCHLORIDE 5 ML: 10 INJECTION, SOLUTION EPIDURAL; INFILTRATION; INTRACAUDAL; PERINEURAL at 14:00

## 2025-05-08 NOTE — PROGRESS NOTES
"Chief Complaint  Follow-up of the Right Shoulder    Subjective          History of Present Illness      Desi Krishnan is a 60 y.o. female  presents to [facility] for     Patient presents for follow-up evaluation right shoulder pain right shoulder osteoarthritis rotator cuff tear.  She is a resident at [facility].  Patient states that since her last visit she had a hospital stay she had weeks long visit at the hospital for pneumonia and kidney problems.  Patient states this caused her a setback with physical therapy that was helping her with her shoulders.  She would like to continue therapy for her shoulders.  She is also requesting right shoulder steroid injection      Allergies   Allergen Reactions    Hydrochlorothiazide     Penicillin G Hives    Penicillins     Vancomycin         Social History     Socioeconomic History    Marital status: Single   Tobacco Use    Smoking status: Former     Current packs/day: 0.00     Average packs/day: 0.5 packs/day for 15.3 years (7.7 ttl pk-yrs)     Types: Cigarettes     Start date: 3/13/1998     Quit date: 7/15/2013     Years since quittin.8    Smokeless tobacco: Never   Vaping Use    Vaping status: Never Used   Substance and Sexual Activity    Alcohol use: No    Drug use: Defer    Sexual activity: Defer        REVIEW OF SYSTEMS    Constitutional: Awake alert and oriented x3, no acute distress, denies fevers, chills, weight loss  Respiratory: No respiratory distress  Vascular: Brisk cap refill, Intact distal pulses, No cyanosis, compartments soft with no signs or symptoms of compartment syndrome or DVT.   Cardiovascular: Denies chest pain, shortness of breath  Skin: Denies rashes, acute skin changes  Neurologic: Denies headache, loss of consciousness  MSK: Right shoulder pain      Objective   Vital Signs:   /79   Pulse 83   Ht 157.5 cm (62.01\")   Wt (!) 170 kg (375 lb) Comment: per chart. Patient wheelchair bound  SpO2 94%   BMI " 68.57 kg/m²     Body mass index is 68.57 kg/m².    Physical Exam       Right shoulder: Tender palpation anterior lateral shoulder skin is intact, no erythema, no ecchymosis or swelling, no atrophy or signs of infection, 4 out of 5 strength, positive impingement testing, active forward elevation 160 with mild pain active abduction 110 with mild pain crepitus with range of motion, neurovascular intact       Large Joint: R subacromial bursa  Date/Time: 5/8/2025 2:00 PM  Consent given by: patient  Site marked: site marked  Timeout: Immediately prior to procedure a time out was called to verify the correct patient, procedure, equipment, support staff and site/side marked as required   Supporting Documentation  Indications: pain   Procedure Details  Location: shoulder - R subacromial bursa  Preparation: Patient was prepped and draped in the usual sterile fashion  Needle gauge: 21g.  Medications administered: 5 mL lidocaine PF 1% 1 %; 40 mg triamcinolone acetonide 40 MG/ML  Patient tolerance: patient tolerated the procedure well with no immediate complications      This injection documentation was Scribed for TIFF Watson by Massiel Guaman MA.  05/08/25   14:01 EDT    Imaging Results (Most Recent)       None                   Assessment and Plan    Diagnoses and all orders for this visit:    1. Nontraumatic tear of right rotator cuff, unspecified tear extent (Primary)  -     Large Joint: R subacromial bursa    2. Primary osteoarthritis of right shoulder  -     Large Joint: R subacromial bursa        Discussed diagnosis and treatment options with the patient she would like to continue conservative treatment she elected to have right shoulder steroid injection.  A sterile prep of the injection site was performed with chloraprep. Cryospray was used for local anesthesia. The site was injected. The patient tolerated the procedure well without complications. Post injection pain was discussed.    The risks,  benefits, complications, treatment options/alternatives, and expected outcomes/goals were discussed with the patient.   Follow-up 3 months    Call or return if worsening symptoms.    Follow Up   Return in about 3 months (around 8/8/2025) for Recheck.  Patient was given instructions and counseling regarding her condition or for health maintenance advice. Please see specific information pulled into the AVS if appropriate.       EMR Dragon/Transcription disclaimer:  Part of this note may be an electronic transcription/translation of spoken language to printed text using the Dragon Dictation System

## 2025-07-07 ENCOUNTER — HOSPITAL ENCOUNTER (OUTPATIENT)
Dept: CARDIOLOGY | Facility: HOSPITAL | Age: 60
Discharge: HOME | End: 2025-07-07
Admitting: STUDENT IN AN ORGANIZED HEALTH CARE EDUCATION/TRAINING PROGRAM
Payer: MEDICARE

## 2025-07-07 ENCOUNTER — OFFICE VISIT (OUTPATIENT)
Age: 60
End: 2025-07-07
Payer: MEDICARE

## 2025-07-07 VITALS
SYSTOLIC BLOOD PRESSURE: 154 MMHG | TEMPERATURE: 97.7 F | DIASTOLIC BLOOD PRESSURE: 52 MMHG | HEART RATE: 74 BPM | RESPIRATION RATE: 18 BRPM | OXYGEN SATURATION: 97 %

## 2025-07-07 DIAGNOSIS — N18.4 CHRONIC KIDNEY DISEASE, STAGE IV (SEVERE): ICD-10-CM

## 2025-07-07 DIAGNOSIS — N18.4 CKD (CHRONIC KIDNEY DISEASE) STAGE 4, GFR 15-29 ML/MIN: Primary | ICD-10-CM

## 2025-07-07 LAB
BH CV VAS MEAS BASILIC ANTECUBITAL FOSSA LEFT: 0.23 CM
BH CV VAS MEAS BASILIC ANTECUBITAL FOSSA RIGHT: 0.11 CM
BH CV VAS MEAS BASILIC FOREARM RIGHT - DIST: 0.07 CM
BH CV VAS MEAS BASILIC FOREARM RIGHT - MID: 0.08 CM
BH CV VAS MEAS BASILIC FOREARM RIGHT - PROX: 0.08 CM
BH CV VAS MEAS BASILIC UPPER ARM LEFT - DIST: 0.23 CM
BH CV VAS MEAS BASILIC UPPER ARM LEFT - MID: 0.17 CM
BH CV VAS MEAS BASILIC UPPER ARM LEFT - PROX: 0.13 CM
BH CV VAS MEAS BASILIC UPPER ARM RIGHT - DIST: 0.16 CM
BH CV VAS MEAS BASILIC UPPER ARM RIGHT - MID: 0.22 CM
BH CV VAS MEAS BASILIC UPPER ARM RIGHT - PROX: 0.15 CM
BH CV VAS MEAS CEPHALIC ANTECUBITAL FOSSA LEFT: 0.28 CM
BH CV VAS MEAS CEPHALIC ANTECUBITAL FOSSA RIGHT: 0.25 CM
BH CV VAS MEAS CEPHALIC FOREARM LEFT - DIST: 0.16 CM
BH CV VAS MEAS CEPHALIC FOREARM LEFT - MID: 0.1 CM
BH CV VAS MEAS CEPHALIC FOREARM LEFT - PROX: 0.2 CM
BH CV VAS MEAS CEPHALIC FOREARM RIGHT - DIST: 0.2 CM
BH CV VAS MEAS CEPHALIC FOREARM RIGHT - MID: 0.22 CM
BH CV VAS MEAS CEPHALIC FOREARM RIGHT - PROX: 0.22 CM
BH CV VAS MEAS CEPHALIC UPPER ARM LEFT - DIST: 0.21 CM
BH CV VAS MEAS CEPHALIC UPPER ARM LEFT - MID: 0.19 CM
BH CV VAS MEAS CEPHALIC UPPER ARM LEFT - PROX: 0.23 CM
BH CV VAS MEAS CEPHALIC UPPER ARM RIGHT - DIST: 0.34 CM
BH CV VAS MEAS CEPHALIC UPPER ARM RIGHT - MID: 0.29 CM
BH CV VAS MEAS CEPHALIC UPPER ARM RIGHT - PROX: 0.36 CM
BH CV VAS MEAS RADIAL UPPER ARM LEFT - DIST: 64.7 CM
BH CV VAS MEAS RADIAL UPPER ARM RIGHT - DIST: 117.4 CM
UPPER ARTERIAL LEFT ARM BRACHIAL LENGTH: 163.2 CM
UPPER ARTERIAL RIGHT ARM BRACHIAL LENGTH: 165.7 CM

## 2025-07-07 PROCEDURE — 93985 DUP-SCAN HEMO COMPL BI STD: CPT | Performed by: SURGERY

## 2025-07-07 PROCEDURE — 93985 DUP-SCAN HEMO COMPL BI STD: CPT

## 2025-07-07 PROCEDURE — 1159F MED LIST DOCD IN RCRD: CPT | Performed by: SURGERY

## 2025-07-07 PROCEDURE — 99213 OFFICE O/P EST LOW 20 MIN: CPT | Performed by: SURGERY

## 2025-07-07 PROCEDURE — 1160F RVW MEDS BY RX/DR IN RCRD: CPT | Performed by: SURGERY

## 2025-07-07 NOTE — PROGRESS NOTES
[facility]   Follow up Office    Patient Name: Desi Krishnan  : 1965  MRN: [id number]  Primary Care Physician:  Alba Guzman MD      Subjective   Subjective     HPI:    Desi Krishnan is a 60 y.o. female still evaluated by our service for possible need for access for hemodialysis.  At the time the patient decided to follow palliative care.  She comes back upon the request of her nephrologist to discuss about permanent access.  She indicates that she is ambidextrous although mostly right-handed.  She actually does not want to proceed with any intervention at this time.  She wants to wait.      Objective     Vitals:   Temp:  [97.7 °F (36.5 °C)] 97.7 °F (36.5 °C)  Heart Rate:  [74] 74  Resp:  [18] 18  BP: (154)/(52) 154/52    Physical Exam      General: Alert, no acute distress.  HEENT: PERRLA  Abdomen: Benign  Extremities: Symmetric.  Neuro: No gross deficits    Diagnostic studies: A vein mapping ultrasound in the office today reveals what appears to be a satisfactory right upper arm cephalic, all other superficial veins appear suboptimal for permanent access for hemodialysis.    Assessment & Plan   Assessment / Plan     Diagnoses and all orders for this visit:    1. CKD (chronic kidney disease) stage 4, GFR 15-29 ml/min (Primary)       Assessment/Plan:   Mrs. Krishnan has significant renal insufficiency.  She comes to discuss about permanent access.  I discussed with her management options and the various types of access.  I advised her that given that she is predominantly right-handed and she has very small veins I would recommend that we proceed to a left upper arm arteriovenous graft.  I discussed with her the mechanics, benefits, risks of the procedure.  At this time however she would like to wait.  She will let us know when she is ready to proceed.        Electronically signed by Hao Toro MD, 25, 10:30 AM EDT.

## 2025-07-19 ENCOUNTER — PREP FOR SURGERY (OUTPATIENT)
Dept: OTHER | Facility: HOSPITAL | Age: 60
End: 2025-07-19
Payer: MEDICARE

## 2025-07-19 DIAGNOSIS — N18.4 CKD (CHRONIC KIDNEY DISEASE) STAGE 4, GFR 15-29 ML/MIN: Primary | ICD-10-CM

## 2025-07-19 RX ORDER — CLINDAMYCIN PHOSPHATE 900 MG/50ML
900 INJECTION, SOLUTION INTRAVENOUS ONCE
OUTPATIENT
Start: 2025-07-19 | End: 2025-07-19

## 2025-07-22 ENCOUNTER — OFFICE VISIT (OUTPATIENT)
Dept: CARDIOLOGY | Facility: CLINIC | Age: 60
End: 2025-07-22
Payer: MEDICARE

## 2025-07-22 ENCOUNTER — PATIENT EDUCATION (SURGERY INSTRUCTIONS) (OUTPATIENT)
Age: 60
End: 2025-07-22
Payer: MEDICARE

## 2025-07-22 VITALS
DIASTOLIC BLOOD PRESSURE: 47 MMHG | OXYGEN SATURATION: 95 % | HEIGHT: 62 IN | HEART RATE: 70 BPM | SYSTOLIC BLOOD PRESSURE: 133 MMHG | BODY MASS INDEX: 68.57 KG/M2

## 2025-07-22 DIAGNOSIS — R06.02 SOB (SHORTNESS OF BREATH): ICD-10-CM

## 2025-07-22 DIAGNOSIS — I51.7 LVH (LEFT VENTRICULAR HYPERTROPHY): ICD-10-CM

## 2025-07-22 DIAGNOSIS — N18.4 CHRONIC KIDNEY DISEASE, STAGE IV (SEVERE): ICD-10-CM

## 2025-07-22 DIAGNOSIS — I50.30: Primary | ICD-10-CM

## 2025-07-22 RX ORDER — INSULIN GLARGINE-YFGN 100 [IU]/ML
INJECTION, SOLUTION SUBCUTANEOUS
COMMUNITY
Start: 2025-07-13

## 2025-07-22 RX ORDER — CHOLECALCIFEROL (VITAMIN D3) 1250 MCG
CAPSULE ORAL
COMMUNITY
Start: 2025-06-27

## 2025-07-22 RX ORDER — ASPIRIN 81 MG/1
81 TABLET ORAL DAILY
Qty: 90 TABLET | Refills: 2 | Status: SHIPPED | OUTPATIENT
Start: 2025-07-22

## 2025-07-22 RX ORDER — FERROUS SULFATE 325(65) MG
TABLET ORAL
COMMUNITY
Start: 2025-06-28

## 2025-07-22 NOTE — PROGRESS NOTES
[facility]  INTERVENTIONAL CARDIOLOGY NEW PATIENT OFFICE VISIT      Chief Complaint  CHF (congestive heart failure), NYHA class II, unspecified  and Follow-up    Subjective          History of Present Illness    Desi Krishnan is a 60 y.o. female who presents to [facility] for new patient visit.       History of Present Illness  The patient presents for follow-up evaluation.    Patient has history of morbid obesity, bilateral leg amputation secondary to diabetes, left-ventricular hypertrophy on echocardiogram, hypertension, hyperlipidemia, CKD stage V.    Previous evaluation, echocardiogram showed mild to moderate concentric left neural hypertrophy with preserved ejection fraction.  No features of LVOT obstruction were noted.  Patient denies chest pain.  She is in wheelchair.    She was hospitalized due to pneumonia and a urinary tract infection (UTI). Despite the removal of excess fluid, she continues to experience fluid retention. She consulted her nephrologist on 07/17/2025 and visited Dr. Toro, a vascular surgeon, two weeks ago. She has decided to proceed with dialysis. She reports no chest pain or wheezing. Her current medications include atorvastatin 40 mg daily, an increased dose of Lasix, and torsemide.    She takes ibuprofen for pain relief in the morning and at night, and Tylenol once daily for spinal discomfort.    SOCIAL HISTORY  Coffee/Tea/Caffeine-containing Drinks: The patient consumes caffeine.        Past History:  Past Medical History:   Diagnosis Date    Arthritis of back 1990    Cervical disc disorder 1979    Diabetes     History of anemia due to chronic kidney disease 04/18/2025    Hypertension     Rotator cuff syndrome 2008    Scoliosis 1965       Medical History:  Past Medical History:   Diagnosis Date    Arthritis of back 1990    Cervical disc disorder 1979    Diabetes     History of anemia due to chronic kidney disease 04/18/2025    Hypertension      Rotator cuff syndrome 2008    Scoliosis 1965       Surgical History:   has a past surgical history that includes Knee surgery; Tubal ligation; and Foot surgery (2014).     Family History:   family history includes Heart disease in her father.     Social History:   reports that she quit smoking about 12 years ago. Her smoking use included cigarettes. She started smoking about 27 years ago. She has a 7.7 pack-year smoking history. She has been exposed to tobacco smoke. She has never used smokeless tobacco. Drug use questions deferred to the physician. She reports that she does not drink alcohol.    Allergies:   Hydrochlorothiazide, Penicillin g, Penicillins, and Vancomycin    Current Outpatient Medications on File Prior to Visit   Medication Sig    acetaminophen (TYLENOL) 500 MG tablet Take 1 tablet by mouth Every 6 (Six) Hours As Needed for Mild Pain, Headache or Fever.    atorvastatin (LIPITOR) 40 MG tablet Take 1 tablet by mouth Daily.    cyanocobalamin 1000 MCG/ML injection Inject 1 mL into the appropriate muscle as directed by prescriber Every 30 (Thirty) Days.    cyclobenzaprine (FLEXERIL) 10 MG tablet Take 1 tablet by mouth 3 (Three) Times a Day As Needed for Muscle Spasms.    Dextrose, Diabetic Use, (Glucose) 77.4 % gel Take 1 dose by mouth As Needed (for bg less than 70).    Farxiga 10 MG tablet Take 10 mg by mouth Daily.    FeroSul 325 (65 Fe) MG tablet     gabapentin (NEURONTIN) 400 MG capsule Take 1 capsule by mouth 3 (Three) Times a Day.    Glucagon HCl (Glucagon Emergency) 1 MG/ML reconstituted solution Inject 1 mL into the appropriate muscle as directed by prescriber As Needed.    Insulin Aspart (novoLOG) 100 UNIT/ML injection Inject  under the skin into the appropriate area as directed 3 (Three) Times a Day Before Meals. SS    Insulin Glargine (BASAGLAR KWIKPEN) 100 UNIT/ML injection pen Inject 16 Units under the skin into the appropriate area as directed 2 (Two) Times a Day.    Insulin Glargine-yfgn  100 UNIT/ML solution pen-injector     levothyroxine (SYNTHROID, LEVOTHROID) 75 MCG tablet Take 1 tablet by mouth Every Morning.    loratadine (CLARITIN) 10 MG tablet Take 1 tablet by mouth Daily.    losartan (COZAAR) 100 MG tablet Take 1 tablet by mouth Daily.    magnesium hydroxide (MILK OF MAGNESIA) 400 MG/5ML suspension Take 30 mL by mouth At Night As Needed for Constipation.    metOLazone (ZAROXOLYN) 2.5 MG tablet Take 1 tablet by mouth Daily.    naloxone (NARCAN) 4 MG/0.1ML nasal spray Administer 1 spray into the nostril(s) as directed by provider As Needed for Opioid Reversal. Call 911. Don't prime. [place] in 1 nostril for overdose. Repeat in 2-3 minutes in other nostril if no or minimal breathing/responsiveness.    sodium chloride 0.65 % nasal spray Administer 1 spray into the nostril(s) as directed by provider Every 30 (Thirty) Minutes As Needed for Congestion.    sore muscle (ICY HOT EXTRA STRENGTH) 10-30 % cream cream Apply 1 Application topically to the appropriate area as directed 2 (Two) Times a Day As Needed.    torsemide 40 MG tablet Take 40 mg by mouth Daily.    vitamin D (ERGOCALCIFEROL) 1.25 MG (40403 UT) capsule capsule Take 1 capsule by mouth Every 7 (Seven) Days. Sunday    [DISCONTINUED] furosemide (LASIX) 20 MG tablet Take 1 tablet by mouth Daily.    amLODIPine (NORVASC) 10 MG tablet Take 1 tablet by mouth Daily. (Patient not taking: Reported on 7/22/2025)    Cholecalciferol (Vitamin D3) 1.25 MG (05742 UT) capsule  (Patient not taking: Reported on 7/22/2025)    HYDROcodone-acetaminophen (NORCO) 5-325 MG per tablet Take 1 tablet by mouth Every 12 (Twelve) Hours As Needed for Moderate Pain. (Patient not taking: Reported on 7/22/2025)    metFORMIN (GLUCOPHAGE) 500 MG tablet  (Patient not taking: Reported on 7/22/2025)    nystatin (MYCOSTATIN) 388204 UNIT/GM powder Apply 1 Application topically to the appropriate area as directed 2 (Two) Times a Day. (Patient not taking: Reported on 7/22/2025)     "sodium zirconium cyclosilicate (Lokelma) 10 g packet Take 10 g by mouth 3 (Three) Times a Week. Empty entire contents of the packet(s) into a glass with at least 3 tablespoons (45 mL) of water. Stir well and drink immediately; if powder remains in the glass, add water, stir and drink immediately; repeat until no powder remains. Administer other oral medications at least 2 hours before or 2 hours after dose. (Patient not taking: Reported on 7/22/2025)     No current facility-administered medications on file prior to visit.          Review of Systems   Negative ROS except as mentioned in HPI above.     Objective     /47   Pulse 70   Ht 157.5 cm (62.01\")   SpO2 95%   BMI 68.57 kg/m²       Physical Exam  General : Alert, awake, no acute distress, morbid obesity  Neck : Supple, no carotid bruit, no jugular venous distention  CVS : Regular rate and rhythm, no murmur, rubs or gallops  Lungs: Clear to auscultation bilaterally, no crackles or rhonchi  Abdomen: Soft, nontender, bowel sounds heard in all 4 quadrants  Extremities: Bilateral BKA      Result Review :     The following data was reviewed by: Siena Arteaga MD on 07/22/2025:    CMP          4/23/2025    05:26 4/24/2025    05:48 4/25/2025    04:51   CMP   Glucose 111  101  133    BUN 98  94  88    Creatinine 4.17  3.85  3.42    EGFR 11.8  13.0  15.0    Sodium 138  141  142    Potassium 4.1  3.3  3.5    Chloride 98  97  99    Calcium 8.4  8.6  8.5    Total Protein 5.8  5.9     Albumin 2.9  3.1     Globulin 2.9  2.8     Total Bilirubin 0.2  0.2     Alkaline Phosphatase 63  64     AST (SGOT) 13  15     ALT (SGPT) 8  12     Albumin/Globulin Ratio 1.0  1.1     BUN/Creatinine Ratio 23.5  24.4  25.7    Anion Gap 16.9  13.5  10.9      CBC          4/23/2025    05:26 4/24/2025    05:48 4/25/2025    04:51   CBC   WBC 6.56  6.62  5.73    RBC 3.15  3.30  3.38    Hemoglobin 8.6  9.0  9.1    Hematocrit 29.6  29.7  30.3    MCV 94.0  90.0  89.6    MCH 27.3  27.3  26.9  "   MCHC 29.1  30.3  30.0    RDW 15.9  15.4  15.6    Platelets 203  207  207      TSH          4/23/2025    05:26   TSH   TSH 6.990             Data reviewed: Cardiology studies    Results for orders placed during the hospital encounter of 04/03/25    Adult Transthoracic Echo Complete w/ Color, Spectral and Contrast if necessary per protocol    Interpretation Summary    Left ventricular systolic function is hyperdynamic (EF > 70%). Calculated left ventricular EF = 75.1%    Left ventricular wall thickness is consistent with mild to moderate concentric hypertrophy.    Left ventricular diastolic function was indeterminate.    Severe mitral annular calcification (MAC) resulting in mild functional mitral stenosis. The mitral valve mean gradient is 3 mmHg at HR 80 bpm.      No results found for this or any previous visit.          Procedures    The ASCVD Risk score (Jim DON, et al., 2019) failed to calculate for the following reasons:    Cannot find a previous HDL lab    Cannot find a previous total cholesterol lab         Assessment and Plan      Diagnoses and all orders for this visit:    1. CHF (congestive heart failure), NYHA class II, unspecified failure chronicity, diastolic (Primary)    2. LVH (left ventricular hypertrophy)    3. SOB (shortness of breath)    4. Chronic kidney disease, stage IV (severe)    Other orders  -     aspirin 81 MG EC tablet; Take 1 tablet by mouth Daily.  Dispense: 90 tablet; Refill: 2        Assessment & Plan  1. Fluid retention:  - Hemodialysis to be initiated as discussed with Dr. Toro, the vascular surgeon.  - Continue torsemide as prescribed.  - Follow-up with nephrology, currently on torsemide and metolazone.  - Monitor fluid levels and adjust medications as needed.    2. Medication Management:  - Continue atorvastatin 40 mg daily for cholesterol management.  - Start baby aspirin 81 mg daily to reduce the risk of stroke and heart disease, especially with diabetes and kidney  disease.  - Discussed benefits of aspirin for heart and brain health, reducing the risk of stroke and heart disease.  - Tylenol can be taken as needed for pain relief.    Follow-up: 01/2026      Patient was educated on cardiac diet, adequate exercise and achieving/maintaining optimal weight.    Desi Krishnan  reports that she quit smoking about 12 years ago. Her smoking use included cigarettes. She started smoking about 27 years ago. She has a 7.7 pack-year smoking history. She has been exposed to tobacco smoke. She has never used smokeless tobacco.              Follow Up     Return in about 6 months (around 1/22/2026) for Next scheduled follow up, With Yancy Giordano.             I spent 45 minutes caring for this patient on this date of service. This time includes time spent by me in the following activities:preparing for the visit, reviewing tests, obtaining and/or reviewing a separately obtained history, performing a medically appropriate examination and/or evaluation , counseling and educating the patient/family/caregiver, ordering medications, tests, or procedures, referring and communicating with other health care professionals , documenting information in the medical record, independently interpreting results and communicating that information with the patient/family/caregiver, and care coordination.     The patient was seen and examined. Work by the provider also included review and/or ordering of lab tests, review and/or ordering of radiology tests, review and/or ordering of medicine tests, discussion with other physicians or providers, independent review of data, obtaining old records, review/summation of old records, and/or other review.    I have reviewed the family history, social history, and past medical history for this patient. Previous information and data has been reviewed and updated as needed. I have reviewed and verified the chief complaint, history, and other documentation. The  patient was interviewed and examined in the clinic and the chart reviewed. The previous observations, recommendations, and conclusions were reviewed including those of other providers.     The plan was discussed with the patient and/or family. The patient was given time to ask questions and these questions were answered. At the conclusion of their visit they had no additional questions or concerns and all questions were answered to their satisfaction.     Patient was given instructions and counseling regarding her condition or for health maintenance advice. Please see specific information pulled into the AVS if appropriate.      Patient or patient representative verbalized consent for the use of Ambient Listening during the visit with  Siena Arteaga MD for chart documentation. 7/22/2025  09:51 EDT      Siena Arteaga MD, [facility]  07/22/25  09:39 EDT    Dictated Utilizing Dragon Dictation

## 2025-07-22 NOTE — PROGRESS NOTES
"Informed patient to arrive at [address] on day of surgery given to them in the office. \"Preparing for Surgery\" pamphlet was given and all NPO restrictions was covered with them. All patients are to hold Eliquis, and Xrelto, 2 days prior to surgery and Warfirin 5 days prior to surgery. Patients may Continue Aspirin, Plavix, and Brlinta per Surgeon. PAT scheduled at least 7 days prior to surgery, date and time given to Patient. 2 week post-op follow up was scheduled in office and given to patient at checkout.  "

## 2025-07-22 NOTE — LETTER
July 22, 2025     Alba Guzman MD  [id number] [facility]  [address]  [facility] [address]    Patient: Desi Krishnan   YOB: 1965   Date of Visit: 7/22/2025       Dear Alba Guzman MD,    Desi Krishnan was in my office today. Below are the relevant portions of my assessment and plan of care.           If you have questions, please do not hesitate to call me. I look forward to following Desi along with you.         Sincerely,        Siena Arteaga MD        CC: Zacarias Obando MD

## 2025-08-12 ENCOUNTER — HOSPITAL ENCOUNTER (EMERGENCY)
Facility: HOSPITAL | Age: 60
Discharge: HOME | End: 2025-08-12
Attending: EMERGENCY MEDICINE
Payer: MEDICARE

## 2025-08-12 VITALS
RESPIRATION RATE: 20 BRPM | SYSTOLIC BLOOD PRESSURE: 132 MMHG | TEMPERATURE: 98.3 F | HEIGHT: 62 IN | OXYGEN SATURATION: 92 % | DIASTOLIC BLOOD PRESSURE: 46 MMHG | WEIGHT: 293 LBS | HEART RATE: 73 BPM | BODY MASS INDEX: 53.92 KG/M2

## 2025-08-12 DIAGNOSIS — R79.89 RENAL AZOTEMIA: Primary | ICD-10-CM

## 2025-08-12 LAB
ALBUMIN SERPL-MCNC: 3.7 G/DL (ref 3.5–5.2)
ALBUMIN/GLOB SERPL: 1.2 G/DL
ALP SERPL-CCNC: 88 U/L (ref 39–117)
ALT SERPL W P-5'-P-CCNC: 18 U/L (ref 1–33)
ANION GAP SERPL CALCULATED.3IONS-SCNC: 14.8 MMOL/L (ref 5–15)
AST SERPL-CCNC: 8 U/L (ref 1–32)
BACTERIA UR QL AUTO: NORMAL /HPF
BASOPHILS # BLD AUTO: 0.03 10*3/MM3 (ref 0–0.2)
BASOPHILS NFR BLD AUTO: 0.3 % (ref 0–1.5)
BILIRUB SERPL-MCNC: <0.2 MG/DL (ref 0–1.2)
BILIRUB UR QL STRIP: NEGATIVE
BUN SERPL-MCNC: 119.2 MG/DL (ref 6–20)
BUN/CREAT SERPL: 24.4 (ref 7–25)
CALCIUM SPEC-SCNC: 8.6 MG/DL (ref 8.6–10.5)
CHLORIDE SERPL-SCNC: 97 MMOL/L (ref 98–107)
CLARITY UR: CLEAR
CO2 SERPL-SCNC: 23.2 MMOL/L (ref 22–29)
COLOR UR: YELLOW
CREAT SERPL-MCNC: 4.88 MG/DL (ref 0.57–1)
DEPRECATED RDW RBC AUTO: 44.7 FL (ref 37–54)
EGFRCR SERPLBLD CKD-EPI 2021: 9.8 ML/MIN/1.73
EOSINOPHIL # BLD AUTO: 0.01 10*3/MM3 (ref 0–0.4)
EOSINOPHIL NFR BLD AUTO: 0.1 % (ref 0.3–6.2)
ERYTHROCYTE [DISTWIDTH] IN BLOOD BY AUTOMATED COUNT: 14.1 % (ref 12.3–15.4)
GLOBULIN UR ELPH-MCNC: 3.1 GM/DL
GLUCOSE SERPL-MCNC: 329 MG/DL (ref 65–99)
GLUCOSE UR STRIP-MCNC: ABNORMAL MG/DL
HCT VFR BLD AUTO: 29.3 % (ref 34–46.6)
HGB BLD-MCNC: 9.4 G/DL (ref 12–15.9)
HGB UR QL STRIP.AUTO: NEGATIVE
HOLD SPECIMEN: NORMAL
HYALINE CASTS UR QL AUTO: NORMAL /LPF
IMM GRANULOCYTES # BLD AUTO: 0.04 10*3/MM3 (ref 0–0.05)
IMM GRANULOCYTES NFR BLD AUTO: 0.4 % (ref 0–0.5)
KETONES UR QL STRIP: NEGATIVE
LEUKOCYTE ESTERASE UR QL STRIP.AUTO: NEGATIVE
LYMPHOCYTES # BLD AUTO: 0.92 10*3/MM3 (ref 0.7–3.1)
LYMPHOCYTES NFR BLD AUTO: 8.4 % (ref 19.6–45.3)
MCH RBC QN AUTO: 28.1 PG (ref 26.6–33)
MCHC RBC AUTO-ENTMCNC: 32.1 G/DL (ref 31.5–35.7)
MCV RBC AUTO: 87.7 FL (ref 79–97)
MONOCYTES # BLD AUTO: 0.39 10*3/MM3 (ref 0.1–0.9)
MONOCYTES NFR BLD AUTO: 3.5 % (ref 5–12)
NEUTROPHILS NFR BLD AUTO: 87.3 % (ref 42.7–76)
NEUTROPHILS NFR BLD AUTO: 9.6 10*3/MM3 (ref 1.7–7)
NITRITE UR QL STRIP: NEGATIVE
NRBC BLD AUTO-RTO: 0 /100 WBC (ref 0–0.2)
PH UR STRIP.AUTO: 7 [PH] (ref 5–8)
PLATELET # BLD AUTO: 306 10*3/MM3 (ref 140–450)
PMV BLD AUTO: 9.6 FL (ref 6–12)
POTASSIUM SERPL-SCNC: 4.6 MMOL/L (ref 3.5–5.2)
PROT SERPL-MCNC: 6.8 G/DL (ref 6–8.5)
PROT UR QL STRIP: ABNORMAL
RBC # BLD AUTO: 3.34 10*6/MM3 (ref 3.77–5.28)
RBC # UR STRIP: NORMAL /HPF
REF LAB TEST METHOD: NORMAL
SODIUM SERPL-SCNC: 135 MMOL/L (ref 136–145)
SP GR UR STRIP: 1.01 (ref 1–1.03)
SQUAMOUS #/AREA URNS HPF: NORMAL /HPF
UROBILINOGEN UR QL STRIP: ABNORMAL
WBC # UR STRIP: NORMAL /HPF
WBC NRBC COR # BLD AUTO: 10.99 10*3/MM3 (ref 3.4–10.8)
WHOLE BLOOD HOLD COAG: NORMAL

## 2025-08-12 PROCEDURE — 85025 COMPLETE CBC W/AUTO DIFF WBC: CPT

## 2025-08-12 PROCEDURE — 80053 COMPREHEN METABOLIC PANEL: CPT

## 2025-08-12 PROCEDURE — 51702 INSERT TEMP BLADDER CATH: CPT

## 2025-08-12 PROCEDURE — 36415 COLL VENOUS BLD VENIPUNCTURE: CPT

## 2025-08-12 PROCEDURE — 81001 URINALYSIS AUTO W/SCOPE: CPT

## 2025-08-12 PROCEDURE — 99283 EMERGENCY DEPT VISIT LOW MDM: CPT

## 2025-08-20 ENCOUNTER — ANESTHESIA EVENT (OUTPATIENT)
Dept: PERIOP | Facility: HOSPITAL | Age: 60
End: 2025-08-20
Payer: MEDICARE

## 2025-08-26 ENCOUNTER — APPOINTMENT (OUTPATIENT)
Dept: GENERAL RADIOLOGY | Facility: HOSPITAL | Age: 60
End: 2025-08-26
Payer: MEDICARE

## 2025-08-26 ENCOUNTER — HOSPITAL ENCOUNTER (OUTPATIENT)
Facility: HOSPITAL | Age: 60
Setting detail: HOSPITAL OUTPATIENT SURGERY
Discharge: NURSING, REHAB OR LONG-TERM CARE | End: 2025-08-26
Attending: SURGERY | Admitting: SURGERY
Payer: MEDICARE

## 2025-08-26 ENCOUNTER — ANESTHESIA (OUTPATIENT)
Dept: PERIOP | Facility: HOSPITAL | Age: 60
End: 2025-08-26
Payer: MEDICARE

## 2025-08-26 VITALS
BODY MASS INDEX: 60.2 KG/M2 | WEIGHT: 293 LBS | RESPIRATION RATE: 15 BRPM | HEART RATE: 57 BPM | TEMPERATURE: 97 F | SYSTOLIC BLOOD PRESSURE: 130 MMHG | OXYGEN SATURATION: 100 % | DIASTOLIC BLOOD PRESSURE: 45 MMHG

## 2025-08-26 DIAGNOSIS — N18.4 CKD (CHRONIC KIDNEY DISEASE) STAGE 4, GFR 15-29 ML/MIN: ICD-10-CM

## 2025-08-26 LAB
ANION GAP SERPL CALCULATED.3IONS-SCNC: 24.1 MMOL/L (ref 5–15)
BASOPHILS # BLD AUTO: 0.05 10*3/MM3 (ref 0–0.2)
BASOPHILS NFR BLD AUTO: 0.6 % (ref 0–1.5)
BUN SERPL-MCNC: 151.2 MG/DL (ref 6–20)
BUN/CREAT SERPL: 25.8 (ref 7–25)
CALCIUM SPEC-SCNC: 8.6 MG/DL (ref 8.6–10.5)
CHLORIDE SERPL-SCNC: 90 MMOL/L (ref 98–107)
CO2 SERPL-SCNC: 18.9 MMOL/L (ref 22–29)
CREAT SERPL-MCNC: 5.85 MG/DL (ref 0.57–1)
DEPRECATED RDW RBC AUTO: 46.9 FL (ref 37–54)
EGFRCR SERPLBLD CKD-EPI 2021: 7.9 ML/MIN/1.73
EOSINOPHIL # BLD AUTO: 0.45 10*3/MM3 (ref 0–0.4)
EOSINOPHIL NFR BLD AUTO: 5.1 % (ref 0.3–6.2)
ERYTHROCYTE [DISTWIDTH] IN BLOOD BY AUTOMATED COUNT: 15 % (ref 12.3–15.4)
GLUCOSE BLDC GLUCOMTR-MCNC: 258 MG/DL (ref 70–99)
GLUCOSE SERPL-MCNC: 259 MG/DL (ref 65–99)
HCT VFR BLD AUTO: 30.3 % (ref 34–46.6)
HGB BLD-MCNC: 9.6 G/DL (ref 12–15.9)
IMM GRANULOCYTES # BLD AUTO: 0.02 10*3/MM3 (ref 0–0.05)
IMM GRANULOCYTES NFR BLD AUTO: 0.2 % (ref 0–0.5)
LYMPHOCYTES # BLD AUTO: 1.01 10*3/MM3 (ref 0.7–3.1)
LYMPHOCYTES NFR BLD AUTO: 11.4 % (ref 19.6–45.3)
MCH RBC QN AUTO: 27.7 PG (ref 26.6–33)
MCHC RBC AUTO-ENTMCNC: 31.7 G/DL (ref 31.5–35.7)
MCV RBC AUTO: 87.3 FL (ref 79–97)
MONOCYTES # BLD AUTO: 0.41 10*3/MM3 (ref 0.1–0.9)
MONOCYTES NFR BLD AUTO: 4.6 % (ref 5–12)
NEUTROPHILS NFR BLD AUTO: 6.95 10*3/MM3 (ref 1.7–7)
NEUTROPHILS NFR BLD AUTO: 78.1 % (ref 42.7–76)
NRBC BLD AUTO-RTO: 0 /100 WBC (ref 0–0.2)
PLATELET # BLD AUTO: 241 10*3/MM3 (ref 140–450)
PMV BLD AUTO: 10.6 FL (ref 6–12)
POTASSIUM SERPL-SCNC: 4 MMOL/L (ref 3.5–5.2)
RBC # BLD AUTO: 3.47 10*6/MM3 (ref 3.77–5.28)
SODIUM SERPL-SCNC: 133 MMOL/L (ref 136–145)
WBC NRBC COR # BLD AUTO: 8.89 10*3/MM3 (ref 3.4–10.8)

## 2025-08-26 PROCEDURE — 25010000002 ONDANSETRON PER 1 MG: Performed by: NURSE ANESTHETIST, CERTIFIED REGISTERED

## 2025-08-26 PROCEDURE — 25810000003 SODIUM CHLORIDE 0.9 % SOLUTION: Performed by: ANESTHESIOLOGY

## 2025-08-26 PROCEDURE — 25010000002 HEPARIN (PORCINE) PER 1000 UNITS: Performed by: NURSE ANESTHETIST, CERTIFIED REGISTERED

## 2025-08-26 PROCEDURE — 25010000002 DEXAMETHASONE PER 1 MG: Performed by: NURSE ANESTHETIST, CERTIFIED REGISTERED

## 2025-08-26 PROCEDURE — 82948 REAGENT STRIP/BLOOD GLUCOSE: CPT | Performed by: NURSE ANESTHETIST, CERTIFIED REGISTERED

## 2025-08-26 PROCEDURE — 87086 URINE CULTURE/COLONY COUNT: CPT | Performed by: SURGERY

## 2025-08-26 PROCEDURE — 93005 ELECTROCARDIOGRAM TRACING: CPT | Performed by: ANESTHESIOLOGY

## 2025-08-26 PROCEDURE — 80048 BASIC METABOLIC PNL TOTAL CA: CPT | Performed by: SURGERY

## 2025-08-26 PROCEDURE — 25010000002 CLINDAMYCIN 900 MG/50ML SOLUTION: Performed by: SURGERY

## 2025-08-26 PROCEDURE — 25010000002 LIDOCAINE PF 2% 2 % SOLUTION: Performed by: NURSE ANESTHETIST, CERTIFIED REGISTERED

## 2025-08-26 PROCEDURE — 85025 COMPLETE CBC W/AUTO DIFF WBC: CPT | Performed by: SURGERY

## 2025-08-26 PROCEDURE — 25010000002 BUPIVACAINE (PF) 0.5 % SOLUTION 10 ML VIAL: Performed by: SURGERY

## 2025-08-26 PROCEDURE — C1768 GRAFT, VASCULAR: HCPCS | Performed by: SURGERY

## 2025-08-26 PROCEDURE — 25010000002 FENTANYL CITRATE (PF) 50 MCG/ML SOLUTION: Performed by: NURSE ANESTHETIST, CERTIFIED REGISTERED

## 2025-08-26 PROCEDURE — 25010000002 VANCOMYCIN 1 G RECONSTITUTED SOLUTION 1 EACH VIAL: Performed by: SURGERY

## 2025-08-26 PROCEDURE — 71045 X-RAY EXAM CHEST 1 VIEW: CPT

## 2025-08-26 PROCEDURE — 25010000002 SUGAMMADEX 200 MG/2ML SOLUTION: Performed by: NURSE ANESTHETIST, CERTIFIED REGISTERED

## 2025-08-26 PROCEDURE — 25010000002 PROPOFOL 10 MG/ML EMULSION: Performed by: NURSE ANESTHETIST, CERTIFIED REGISTERED

## 2025-08-26 PROCEDURE — 25010000002 HEPARIN (PORCINE) PER 1000 UNITS: Performed by: SURGERY

## 2025-08-26 PROCEDURE — 25010000002 FUROSEMIDE PER 20 MG: Performed by: ANESTHESIOLOGY

## 2025-08-26 PROCEDURE — 25010000002 LIDOCAINE 1 % SOLUTION 20 ML VIAL: Performed by: SURGERY

## 2025-08-26 PROCEDURE — 25010000002 MIDAZOLAM PER 1MG: Performed by: ANESTHESIOLOGY

## 2025-08-26 DEVICE — GRFT VASC PROPATEN STD 6MM 40CM: Type: IMPLANTABLE DEVICE | Site: ARM | Status: FUNCTIONAL

## 2025-08-26 RX ORDER — PROPOFOL 10 MG/ML
VIAL (ML) INTRAVENOUS AS NEEDED
Status: DISCONTINUED | OUTPATIENT
Start: 2025-08-26 | End: 2025-08-26 | Stop reason: SURG

## 2025-08-26 RX ORDER — PROMETHAZINE HYDROCHLORIDE 25 MG/1
25 SUPPOSITORY RECTAL ONCE AS NEEDED
Status: DISCONTINUED | OUTPATIENT
Start: 2025-08-26 | End: 2025-08-26 | Stop reason: HOSPADM

## 2025-08-26 RX ORDER — ROCURONIUM BROMIDE 10 MG/ML
INJECTION, SOLUTION INTRAVENOUS AS NEEDED
Status: DISCONTINUED | OUTPATIENT
Start: 2025-08-26 | End: 2025-08-26 | Stop reason: SURG

## 2025-08-26 RX ORDER — ONDANSETRON 2 MG/ML
4 INJECTION INTRAMUSCULAR; INTRAVENOUS ONCE AS NEEDED
Status: DISCONTINUED | OUTPATIENT
Start: 2025-08-26 | End: 2025-08-26 | Stop reason: HOSPADM

## 2025-08-26 RX ORDER — CLINDAMYCIN PHOSPHATE 900 MG/50ML
900 INJECTION, SOLUTION INTRAVENOUS ONCE
Status: COMPLETED | OUTPATIENT
Start: 2025-08-26 | End: 2025-08-26

## 2025-08-26 RX ORDER — PROMETHAZINE HYDROCHLORIDE 25 MG/1
25 TABLET ORAL ONCE AS NEEDED
Status: DISCONTINUED | OUTPATIENT
Start: 2025-08-26 | End: 2025-08-26 | Stop reason: HOSPADM

## 2025-08-26 RX ORDER — EPHEDRINE SULFATE 50 MG/ML
INJECTION INTRAVENOUS AS NEEDED
Status: DISCONTINUED | OUTPATIENT
Start: 2025-08-26 | End: 2025-08-26 | Stop reason: SURG

## 2025-08-26 RX ORDER — OXYCODONE HYDROCHLORIDE 5 MG/1
5 TABLET ORAL
Status: DISCONTINUED | OUTPATIENT
Start: 2025-08-26 | End: 2025-08-26 | Stop reason: HOSPADM

## 2025-08-26 RX ORDER — ACETAMINOPHEN 500 MG
1000 TABLET ORAL ONCE
Status: DISCONTINUED | OUTPATIENT
Start: 2025-08-26 | End: 2025-08-26

## 2025-08-26 RX ORDER — DEXAMETHASONE SODIUM PHOSPHATE 4 MG/ML
INJECTION, SOLUTION INTRA-ARTICULAR; INTRALESIONAL; INTRAMUSCULAR; INTRAVENOUS; SOFT TISSUE AS NEEDED
Status: DISCONTINUED | OUTPATIENT
Start: 2025-08-26 | End: 2025-08-26 | Stop reason: SURG

## 2025-08-26 RX ORDER — OXYCODONE AND ACETAMINOPHEN 5; 325 MG/1; MG/1
1 TABLET ORAL EVERY 6 HOURS PRN
Qty: 20 TABLET | Refills: 0 | Status: ON HOLD | OUTPATIENT
Start: 2025-08-26

## 2025-08-26 RX ORDER — ACETAMINOPHEN 500 MG
500 TABLET ORAL ONCE
Status: COMPLETED | OUTPATIENT
Start: 2025-08-26 | End: 2025-08-26

## 2025-08-26 RX ORDER — FUROSEMIDE 10 MG/ML
20 INJECTION INTRAMUSCULAR; INTRAVENOUS ONCE
Status: DISCONTINUED | OUTPATIENT
Start: 2025-08-26 | End: 2025-08-26

## 2025-08-26 RX ORDER — LIDOCAINE HYDROCHLORIDE 20 MG/ML
INJECTION, SOLUTION EPIDURAL; INFILTRATION; INTRACAUDAL; PERINEURAL AS NEEDED
Status: DISCONTINUED | OUTPATIENT
Start: 2025-08-26 | End: 2025-08-26 | Stop reason: SURG

## 2025-08-26 RX ORDER — MIDAZOLAM HYDROCHLORIDE 2 MG/2ML
2 INJECTION, SOLUTION INTRAMUSCULAR; INTRAVENOUS ONCE
Status: COMPLETED | OUTPATIENT
Start: 2025-08-26 | End: 2025-08-26

## 2025-08-26 RX ORDER — HEPARIN SODIUM 5000 [USP'U]/ML
INJECTION, SOLUTION INTRAVENOUS; SUBCUTANEOUS AS NEEDED
Status: DISCONTINUED | OUTPATIENT
Start: 2025-08-26 | End: 2025-08-26 | Stop reason: SURG

## 2025-08-26 RX ORDER — SODIUM CHLORIDE 9 MG/ML
9 INJECTION, SOLUTION INTRAVENOUS ONCE AS NEEDED
Status: DISCONTINUED | OUTPATIENT
Start: 2025-08-26 | End: 2025-08-26 | Stop reason: HOSPADM

## 2025-08-26 RX ORDER — FENTANYL CITRATE 50 UG/ML
INJECTION, SOLUTION INTRAMUSCULAR; INTRAVENOUS AS NEEDED
Status: DISCONTINUED | OUTPATIENT
Start: 2025-08-26 | End: 2025-08-26 | Stop reason: SURG

## 2025-08-26 RX ORDER — FUROSEMIDE 10 MG/ML
20 INJECTION INTRAMUSCULAR; INTRAVENOUS ONCE
Status: COMPLETED | OUTPATIENT
Start: 2025-08-26 | End: 2025-08-26

## 2025-08-26 RX ORDER — ONDANSETRON 2 MG/ML
INJECTION INTRAMUSCULAR; INTRAVENOUS AS NEEDED
Status: DISCONTINUED | OUTPATIENT
Start: 2025-08-26 | End: 2025-08-26 | Stop reason: SURG

## 2025-08-26 RX ADMIN — ROCURONIUM BROMIDE 50 MG: 10 INJECTION, SOLUTION INTRAVENOUS at 12:24

## 2025-08-26 RX ADMIN — LIDOCAINE HYDROCHLORIDE 80 MG: 20 INJECTION, SOLUTION EPIDURAL; INFILTRATION; INTRACAUDAL; PERINEURAL at 12:24

## 2025-08-26 RX ADMIN — ACETAMINOPHEN 500 MG: 500 TABLET ORAL at 11:41

## 2025-08-26 RX ADMIN — DEXAMETHASONE SODIUM PHOSPHATE 4 MG: 4 INJECTION, SOLUTION INTRAMUSCULAR; INTRAVENOUS at 12:52

## 2025-08-26 RX ADMIN — SODIUM CHLORIDE 9 ML/HR: 9 INJECTION, SOLUTION INTRAVENOUS at 12:13

## 2025-08-26 RX ADMIN — FENTANYL CITRATE 100 MCG: 50 INJECTION, SOLUTION INTRAMUSCULAR; INTRAVENOUS at 12:24

## 2025-08-26 RX ADMIN — FUROSEMIDE 20 MG: 10 INJECTION, SOLUTION INTRAMUSCULAR; INTRAVENOUS at 15:55

## 2025-08-26 RX ADMIN — MIDAZOLAM HYDROCHLORIDE 2 MG: 1 INJECTION, SOLUTION INTRAMUSCULAR; INTRAVENOUS at 11:41

## 2025-08-26 RX ADMIN — SUGAMMADEX 400 MG: 100 INJECTION, SOLUTION INTRAVENOUS at 14:21

## 2025-08-26 RX ADMIN — EPHEDRINE SULFATE 20 MG: 50 INJECTION INTRAVENOUS at 13:09

## 2025-08-26 RX ADMIN — PROPOFOL 150 MG: 10 INJECTION, EMULSION INTRAVENOUS at 12:24

## 2025-08-26 RX ADMIN — ONDANSETRON 4 MG: 2 INJECTION, SOLUTION INTRAMUSCULAR; INTRAVENOUS at 12:52

## 2025-08-26 RX ADMIN — HEPARIN SODIUM 5000 UNITS: 5000 INJECTION INTRAVENOUS; SUBCUTANEOUS at 13:11

## 2025-08-26 RX ADMIN — EPHEDRINE SULFATE 20 MG: 50 INJECTION INTRAVENOUS at 12:40

## 2025-08-26 RX ADMIN — CLINDAMYCIN PHOSPHATE 900 MG: 900 INJECTION, SOLUTION INTRAVENOUS at 12:19

## 2025-08-27 ENCOUNTER — TELEPHONE (OUTPATIENT)
Age: 60
End: 2025-08-27
Payer: MEDICARE

## 2025-08-27 LAB
BACTERIA SPEC AEROBE CULT: NO GROWTH
QT INTERVAL: 472 MS
QTC INTERVAL: 443 MS

## 2025-08-29 ENCOUNTER — TELEPHONE (OUTPATIENT)
Age: 60
End: 2025-08-29
Payer: MEDICARE

## 2025-08-29 PROBLEM — N18.9 ACUTE RENAL FAILURE SUPERIMPOSED ON CHRONIC KIDNEY DISEASE: Status: ACTIVE | Noted: 2025-08-29

## 2025-08-29 PROBLEM — N17.9 AKI (ACUTE KIDNEY INJURY): Status: ACTIVE | Noted: 2025-08-29

## 2025-08-29 PROBLEM — N17.9 ACUTE RENAL FAILURE SUPERIMPOSED ON CHRONIC KIDNEY DISEASE: Status: ACTIVE | Noted: 2025-08-29

## 2025-08-30 ENCOUNTER — ANESTHESIA (OUTPATIENT)
Dept: PERIOP | Facility: HOSPITAL | Age: 60
End: 2025-08-30
Payer: MEDICARE

## 2025-08-30 ENCOUNTER — ANESTHESIA EVENT (OUTPATIENT)
Dept: PERIOP | Facility: HOSPITAL | Age: 60
End: 2025-08-30
Payer: MEDICARE

## 2025-08-30 PROBLEM — R79.89 AZOTEMIA: Status: ACTIVE | Noted: 2025-08-30

## 2025-08-30 PROBLEM — N18.9 ANEMIA DUE TO CHRONIC KIDNEY DISEASE: Status: ACTIVE | Noted: 2025-08-30

## 2025-08-30 PROBLEM — E83.51 HYPOCALCEMIA: Status: ACTIVE | Noted: 2025-08-30

## 2025-08-30 PROBLEM — N18.5 CKD (CHRONIC KIDNEY DISEASE) STAGE 5, GFR LESS THAN 15 ML/MIN: Status: ACTIVE | Noted: 2025-08-30

## 2025-08-30 PROBLEM — D63.1 ANEMIA DUE TO CHRONIC KIDNEY DISEASE: Status: ACTIVE | Noted: 2025-08-30

## 2025-08-30 PROCEDURE — 25010000002 MIDAZOLAM PER 1MG

## 2025-08-30 PROCEDURE — 25010000002 GLYCOPYRROLATE 0.2 MG/ML SOLUTION

## 2025-08-30 PROCEDURE — 25010000002 PROPOFOL 10 MG/ML EMULSION

## 2025-08-30 RX ORDER — GLYCOPYRROLATE 0.2 MG/ML
INJECTION INTRAMUSCULAR; INTRAVENOUS AS NEEDED
Status: DISCONTINUED | OUTPATIENT
Start: 2025-08-30 | End: 2025-08-30 | Stop reason: SURG

## 2025-08-30 RX ORDER — KETAMINE HYDROCHLORIDE 10 MG/ML
INJECTION, SOLUTION INTRAMUSCULAR; INTRAVENOUS AS NEEDED
Status: DISCONTINUED | OUTPATIENT
Start: 2025-08-30 | End: 2025-08-30 | Stop reason: SURG

## 2025-08-30 RX ORDER — MIDAZOLAM HYDROCHLORIDE 2 MG/2ML
INJECTION, SOLUTION INTRAMUSCULAR; INTRAVENOUS AS NEEDED
Status: DISCONTINUED | OUTPATIENT
Start: 2025-08-30 | End: 2025-08-30 | Stop reason: SURG

## 2025-08-30 RX ORDER — PROPOFOL 10 MG/ML
VIAL (ML) INTRAVENOUS AS NEEDED
Status: DISCONTINUED | OUTPATIENT
Start: 2025-08-30 | End: 2025-08-30 | Stop reason: SURG

## 2025-08-30 RX ADMIN — MIDAZOLAM HYDROCHLORIDE 1 MG: 1 INJECTION, SOLUTION INTRAMUSCULAR; INTRAVENOUS at 12:12

## 2025-08-30 RX ADMIN — GLYCOPYRROLATE 0.2 MG: 0.2 INJECTION INTRAMUSCULAR; INTRAVENOUS at 12:09

## 2025-08-30 RX ADMIN — PROPOFOL 20 MG: 10 INJECTION, EMULSION INTRAVENOUS at 12:22

## 2025-08-30 RX ADMIN — KETAMINE HYDROCHLORIDE 10 MG: 10 INJECTION INTRAMUSCULAR; INTRAVENOUS at 12:21

## 2025-08-30 RX ADMIN — KETAMINE HYDROCHLORIDE 10 MG: 10 INJECTION INTRAMUSCULAR; INTRAVENOUS at 12:12

## 2025-08-31 PROBLEM — R00.1 BRADYCARDIA: Status: ACTIVE | Noted: 2025-08-31

## (undated) DEVICE — LIGACLIP MCA MULTIPLE CLIP APPLIERS, 20 MEDIUM CLIPS
Type: IMPLANTABLE DEVICE | Site: ARM | Status: NON-FUNCTIONAL
Brand: LIGACLIP

## (undated) DEVICE — SOL NS 500ML

## (undated) DEVICE — LIGACLIP MCA MULTIPLE CLIP APPLIERS, 20 SMALL CLIPS
Type: IMPLANTABLE DEVICE | Site: ARM | Status: NON-FUNCTIONAL
Brand: LIGACLIP

## (undated) DEVICE — SLV SCD KN/LEN ADJ EXPRSS BLENDED MD 1P/U

## (undated) DEVICE — SYR HEP MEDALLION 20ML RED

## (undated) DEVICE — CLAMP INSERT: Brand: STEALTH® CLAMP INSERT

## (undated) DEVICE — GLV SURG SENSICARE PI ORTHO SZ6.5 LF STRL

## (undated) DEVICE — PENCL SMOKE/EVAC MEGADYNE TELESCP 10FT

## (undated) DEVICE — SUT SILK 2/0 TIES 18IN A185H

## (undated) DEVICE — SUT VIC 3/0 SH 27IN J416H

## (undated) DEVICE — SUT SILK 3/0 TIES 18IN A184H

## (undated) DEVICE — SUT PROLN 7/0 BV1 D/A 24IN 8702H

## (undated) DEVICE — THE STERILE LIGHT HANDLE COVER IS USED WITH STERIS SURGICAL LIGHTING AND VISUALIZATION SYSTEMS.

## (undated) DEVICE — SUT PROLN 6/0 C1 D/A 30IN 8706H

## (undated) DEVICE — STERILE POLYISOPRENE POWDER-FREE SURGICAL GLOVES: Brand: PROTEXIS

## (undated) DEVICE — ARM VASCULAR-LF: Brand: MEDLINE INDUSTRIES, INC.

## (undated) DEVICE — STERILE POLYISOPRENE POWDER-FREE SURGICAL GLOVES WITH EMOLLIENT COATING: Brand: PROTEXIS

## (undated) DEVICE — GLV SURG SENSICARE PI ORTHO SZ7.5 LF STRL